# Patient Record
Sex: MALE | Race: WHITE | Employment: FULL TIME | URBAN - METROPOLITAN AREA
[De-identification: names, ages, dates, MRNs, and addresses within clinical notes are randomized per-mention and may not be internally consistent; named-entity substitution may affect disease eponyms.]

---

## 2017-01-04 ENCOUNTER — TELEPHONE (OUTPATIENT)
Dept: TRANSPLANT | Facility: CLINIC | Age: 62
End: 2017-01-04

## 2017-01-04 DIAGNOSIS — Z94.83 PANCREAS REPLACED BY TRANSPLANT (H): Primary | ICD-10-CM

## 2017-01-04 DIAGNOSIS — T86.899 COMPLICATION OF TRANSPLANTED PANCREAS: ICD-10-CM

## 2017-01-04 NOTE — TELEPHONE ENCOUNTER
Pt now tells me that he has been taking insulin since March 2016. He takes Lantau 14 units at HS and then 4 + units before each meal. Adds SS amount depending on his glucose. He has not returned to his PCP since then for any follow-up.  Will have him come here for his labs and US next week.

## 2017-01-04 NOTE — TELEPHONE ENCOUNTER
"Creatinine is rising- pt admits to possibly being dehydrated. Will make sure he is well hydrated and will have his labs drawn again next week.  Additionally, his glucose is 250- pt states that once in a while it goes up to about 250 and after a couple of days, comes back down to the 90's. He has not seen any physician about any of this.   Will get him in to see one of our transplant nephrologist's to assess both his rising creatinine and his elevated glucoses.     Per Dr Roe:  \"He needs to be established with a PCP to manage DM   Would like to know his A1c and C. Peptide in addition to the usual enzyme monitoring   Would get DSA and usual work up with imaging i.e. U/S of both grafts   Then we can discuss him in the light of the results   If DM developed related to rejection then it may be to far gone. If DM developed due to insulin resistance then will need to be addressed by the primary\"  "

## 2017-01-11 DIAGNOSIS — T86.899 COMPLICATION OF TRANSPLANTED PANCREAS: ICD-10-CM

## 2017-01-11 DIAGNOSIS — Z94.83 PANCREAS REPLACED BY TRANSPLANT (H): ICD-10-CM

## 2017-01-11 LAB
AMYLASE SERPL-CCNC: 85 U/L (ref 30–110)
ANION GAP SERPL CALCULATED.3IONS-SCNC: 9 MMOL/L (ref 3–14)
BASOPHILS # BLD AUTO: 0 10E9/L (ref 0–0.2)
BASOPHILS NFR BLD AUTO: 0.5 %
BUN SERPL-MCNC: 30 MG/DL (ref 7–30)
C PEPTIDE SERPL-MCNC: 0.6 NG/ML (ref 0.9–6.9)
CALCIUM SERPL-MCNC: 8.9 MG/DL (ref 8.5–10.1)
CHLORIDE SERPL-SCNC: 111 MMOL/L (ref 94–109)
CO2 SERPL-SCNC: 19 MMOL/L (ref 20–32)
CREAT SERPL-MCNC: 1.24 MG/DL (ref 0.66–1.25)
CYCLOSPORINE BLD LC/MS/MS-MCNC: 130 UG/L (ref 50–400)
DIFFERENTIAL METHOD BLD: ABNORMAL
EOSINOPHIL # BLD AUTO: 0 10E9/L (ref 0–0.7)
EOSINOPHIL NFR BLD AUTO: 1 %
ERYTHROCYTE [DISTWIDTH] IN BLOOD BY AUTOMATED COUNT: 13.8 % (ref 10–15)
GFR SERPL CREATININE-BSD FRML MDRD: 59 ML/MIN/1.7M2
GLUCOSE SERPL-MCNC: 102 MG/DL (ref 70–99)
HCT VFR BLD AUTO: 39.9 % (ref 40–53)
HGB BLD-MCNC: 14.5 G/DL (ref 13.3–17.7)
IMM GRANULOCYTES # BLD: 0 10E9/L (ref 0–0.4)
IMM GRANULOCYTES NFR BLD: 0.5 %
LIPASE SERPL-CCNC: 144 U/L (ref 73–393)
LYMPHOCYTES # BLD AUTO: 0.9 10E9/L (ref 0.8–5.3)
LYMPHOCYTES NFR BLD AUTO: 22.5 %
MCH RBC QN AUTO: 39.3 PG (ref 26.5–33)
MCHC RBC AUTO-ENTMCNC: 36.3 G/DL (ref 31.5–36.5)
MCV RBC AUTO: 108 FL (ref 78–100)
MONOCYTES # BLD AUTO: 0.5 10E9/L (ref 0–1.3)
MONOCYTES NFR BLD AUTO: 10.8 %
NEUTROPHILS # BLD AUTO: 2.7 10E9/L (ref 1.6–8.3)
NEUTROPHILS NFR BLD AUTO: 64.7 %
NRBC # BLD AUTO: 0 10*3/UL
NRBC BLD AUTO-RTO: 0 /100
PLATELET # BLD AUTO: 203 10E9/L (ref 150–450)
POTASSIUM SERPL-SCNC: 4.5 MMOL/L (ref 3.4–5.3)
RBC # BLD AUTO: 3.69 10E12/L (ref 4.4–5.9)
SODIUM SERPL-SCNC: 139 MMOL/L (ref 133–144)
TME LAST DOSE: NORMAL H
WBC # BLD AUTO: 4.2 10E9/L (ref 4–11)

## 2017-01-13 LAB
GAD65 AB SER IA-ACNC: NORMAL
PANC ISLET CELL AB TITR SER: NORMAL {TITER}

## 2017-01-14 LAB — INSULIN HUMAN AB SER-ACNC: NORMAL

## 2017-01-16 ENCOUNTER — TELEPHONE (OUTPATIENT)
Dept: TRANSPLANT | Facility: CLINIC | Age: 62
End: 2017-01-16

## 2017-01-16 NOTE — TELEPHONE ENCOUNTER
Left voicemail for Mathew to return my call.  I need to make sure 2/8 appointment with Dr. Strauss will work for him.  Shanae asked me to get him in soon.

## 2017-01-17 NOTE — TELEPHONE ENCOUNTER
Mathew said that he has to double check his schedule to make sure the 2/8 appointment will work for him.  He will call me back tomorrow to confirm.

## 2017-01-26 ENCOUNTER — TELEPHONE (OUTPATIENT)
Dept: NEPHROLOGY | Facility: CLINIC | Age: 62
End: 2017-01-26

## 2017-01-26 NOTE — TELEPHONE ENCOUNTER
Hello, this is Rica's office from Medicine Specialty on the 3rd floor at [facility] located at [address] [place] [address]. We are reminding you of your upcoming Nephrology appointment on 2/9/2017 at 3:55 ppm. Please arrive 30 prior to your appointment time for check in. Also, please bring an updated medication list including dose of prescribed and over the counter medications you are currently taking or your labeled medication bottles with you to your appointment. If you have any questions or would like to cancel or reschedule your appointment, please call us at [phone].     If you are having labs draw same day you need a lab appointment scheduled 1 hour prior to your visit.    You are welcome to have your labs done 2-7 days before your appointment at any [facility] or [facility] facility. If you have your labs completed before your appointment, please still come 15 minutes early for check-in.     Thank-You for allowing us to be a member of your Health Care Team,     Breanna ISRAEL CMA

## 2017-02-08 ENCOUNTER — OFFICE VISIT (OUTPATIENT)
Dept: NEPHROLOGY | Facility: CLINIC | Age: 62
End: 2017-02-08
Attending: INTERNAL MEDICINE
Payer: COMMERCIAL

## 2017-02-08 VITALS
TEMPERATURE: 98.4 F | SYSTOLIC BLOOD PRESSURE: 186 MMHG | HEART RATE: 64 BPM | DIASTOLIC BLOOD PRESSURE: 78 MMHG | OXYGEN SATURATION: 94 % | WEIGHT: 195.2 LBS

## 2017-02-08 DIAGNOSIS — Z94.0 H/O KIDNEY TRANSPLANT: ICD-10-CM

## 2017-02-08 DIAGNOSIS — C44.90 SKIN CANCER: ICD-10-CM

## 2017-02-08 DIAGNOSIS — I15.1 HYPERTENSION SECONDARY TO OTHER RENAL DISORDERS: ICD-10-CM

## 2017-02-08 DIAGNOSIS — E10.22 TYPE 1 DIABETES MELLITUS WITH DIABETIC CHRONIC KIDNEY DISEASE, UNSPECIFIED CKD STAGE (H): Primary | ICD-10-CM

## 2017-02-08 PROCEDURE — 99213 OFFICE O/P EST LOW 20 MIN: CPT | Mod: ZF

## 2017-02-08 NOTE — MR AVS SNAPSHOT
"              After Visit Summary   2/8/2017    Mathew Vaughan    MRN: [id number]           Patient Information     Date Of Birth          1955        Visit Information        Provider Department      2/8/2017 3:55 PM Enedina Strauss MD [facility] Nephrology        Today's Diagnoses     Type 1 diabetes mellitus with diabetic chronic kidney disease, unspecified CKD stage (H)    -  1     Skin cancer         H/O kidney transplant         Hypertension secondary to other renal disorders           Care Instructions    1.  Low salt diet  2.  Decrease imuran to 150 mg for 3 months.  Then 125 mg daily  3.  Stop ibuprofen  4.  Check BP at home twice daily and call  Daysi  next week  5.  Check cyclosporin level  Friday  6.  Will add BP med if it stays >150/90        Follow-ups after your visit        Follow-up notes from your care team     Return in about 1 year (around 2/8/2018).      Who to contact     If you have questions or need follow up information about today's clinic visit or your schedule please contact [facility] NEPHROLOGY directly at [phone].  Normal or non-critical lab and imaging results will be communicated to you by Chromahart, letter or phone within 4 business days after the clinic has received the results. If you do not hear from us within 7 days, please contact the clinic through [employer] or phone. If you have a critical or abnormal lab result, we will notify you by phone as soon as possible.  Submit refill requests through [employer] or call your pharmacy and they will forward the refill request to us. Please allow 3 business days for your refill to be completed.          Additional Information About Your Visit        Chromahart Information     [employer] lets you send messages to your doctor, view your test results, renew your prescriptions, schedule appointments and more. To sign up, go to [email or web address] . Click on \"Log in\" on the left side of the screen, which will take you to the " "Welcome page. Then click on \"Sign up Now\" on the right side of the page.     You will be asked to enter the access code listed below, as well as some personal information. Please follow the directions to create your username and password.     Your access code is: [email or web address]  Expires: 2017  6:30 AM     Your access code will  in 90 days. If you need help or a new code, please call your [facility] clinic or [phone].        Care EveryWhere ID     This is your Care EveryWhere ID. This could be used by other organizations to access your [facility] medical records  [id number]        Your Vitals Were     Pulse Temperature Pulse Oximetry             64 98.4  F (36.9  C) (Oral) 94%          Blood Pressure from Last 3 Encounters:   17 186/78   10/28/13 132/73    Weight from Last 3 Encounters:   17 88.542 kg (195 lb 3.2 oz)              Today, you had the following     No orders found for display       Primary Care Provider Office Phone # Fax #    Paul Osman [phone]       XXX RETIRED XXX  XXX [place] [address]        Thank you!     Thank you for choosing [facility] NEPHROLOGY  for your care. Our goal is always to provide you with excellent care. Hearing back from our patients is one way we can continue to improve our services. Please take a few minutes to complete the written survey that you may receive in the mail after your visit with us. Thank you!             Your Updated Medication List - Protect others around you: Learn how to safely use, store and throw away your medicines at www.disposemymeds.org.          This list is accurate as of: 17  4:50 PM.  Always use your most recent med list.                   Brand Name Dispense Instructions for use    azaTHIOprine 50 MG tablet    IMURAN    315 tablet    Take 3.5 tablets (175 mg) by mouth daily       * cycloSPORINE 100 MG capsule    SANDIMMUNE    180 capsule    Take 1 capsule (100 mg) by mouth 2 times daily Take with 25 mg " capsules. Total daily dose is 125 mg twice per day.       * cycloSPORINE 25 MG capsule    SANDIMMUNE    180 capsule    Take 1 capsule (25 mg) by mouth 2 times daily Take with 100 mg capsules. Total daily dose is 125 mg twice per day.       SODIUM BICARBONATE PO      Take 0.5 teaspoonful by mouth daily       UNKNOWN TO PATIENT      Take 1 tablet by mouth daily BLOOD PRESSURE MEDICATION, small white pill taken in the morning       * Notice:  This list has 2 medication(s) that are the same as other medications prescribed for you. Read the directions carefully, and ask your doctor or other care provider to review them with you.

## 2017-02-08 NOTE — NURSING NOTE
Chief Complaint   Patient presents with     RECHECK     Follow up kidney transplant       Initial /78 mmHg  Pulse 64  Temp(Src) 98.4  F (36.9  C) (Oral)  Wt 88.542 kg (195 lb 3.2 oz)  SpO2 94% There is no height on file to calculate BMI.  Medication Reconciliation: complete   Breanna ISRAEL CMA

## 2017-02-08 NOTE — LETTER
2/8/2017      RE: Mathew Vaughan  [address] [place] [id number]       REASON FOR VISIT:  Mr. Vaughan is a 61-year-old gentleman, status post SPK transplant in 1989, referred by Dr Osman primary care provider for a number of medical problems and to consider reduction of immunosuppression.       HISTORY OF PRESENT ILLNESS:  The patient has not been seen here by a nephrologist since 2004 (this is as far back as the chart goes).  He was seen by Dr. Bernardo Cadena in 2013 when he presented with a glucose of 350.  At that time, he had an unremarkable pancreas transplant US and was assumed to have developed recurrent diabetes in the setting of a sinus infection.  It was thought that rejection was unlikely, so a pancreas biopsy was not performed and the patient was placed back on insulin.       With respect to the kidney transplant, immunosuppression has been cyclosporine and azathioprine.  The patient has infrequent levels checked.  Most recent cyclosporine level was 130.  Imuran dose is 175 mg daily (2 mg/ kg).  Last DSA was checked in 05/2014 and was negative.  Baseline creatinine has been 1.0 to 1.2.        The patient presented today with new onset hypertension.  It was 196/76 in the clinic.  Blood pressure medication is metoprolol 100 mg XL daily and no diuretic.  He takes ibuprofen twice a week for headache or joint pain.  He has had no recent change in any medications.  He has had no recent weight gain.  He does not eat a high-salt diet.  Blood pressure has never been high in other clinics and our most recent blood pressure is 132/73, but that was in 2013.       Skin cancer - the patient has had a number of lesions removed.  He is overdue to see his dermatologist but generally sees one twice a year.       REVIEW OF SYSTEMS:  Comprehensive review of systems was completed and negative except as in the HPI.      SOCIAL HISTORY:  He lives in [place].  He gets exercise walking large dogs for up  to an hour and a half a day.  He drinks alcohol periodically and smokes a pipe.  He is currently working in [employer].      FAMILY HISTORY:  He has two brothers and one sister.  No one has hypertension or kidney disease.  He has two children.  A daughter, age 30, has hypertension and has a home blood pressure cuff.       PAST MEDICAL HISTORY:   1.  Type 1 diabetes, onset age 7 (1963).  Diabetes-free from 1989 to 2015 with a functioning pancreas transplant.    2.  Status post SPK, 1989, with no rejection episodes.   3.  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    4.  Skin cancer, onset 2012, with multiple Mohs procedures, now being seen twice per year.   5.  Hypertension, new onset (per patient) in clinic today.      PHYSICAL EXAMINATION:   VITAL SIGNS:  Blood pressure 196/76, O2 sat 94%, heart rate 64, weight 88 kg.    GENERAL:  He is well-developed, well-nourished.    SKIN:  He has extensive skin changes consistent with chronic immunosuppression with rough skin, scaly skin and actinic keratoses over his arms, legs, face and neck.   LUNGS:  Clear bilaterally.   CARDIAC:  Regular sinus rhythm.  No murmurs, rubs or gallops.   ABDOMEN:  Benign.  No bruit over the kidney transplant.    EXTREMITIES:  No peripheral edema.   NEUROLOGIC:  Intact.    LYMPH:  No adenopathy.    NECK:  No carotid bruits.       LABORATORY TESTS:  Creatinine 1.24.  Cyclosporine level 130.  DSA 0 in 2014.      Electrolytes/Renal -   Recent Labs   Lab Test  01/11/17   0915  10/21/13   1423   NA  139  137   POTASSIUM  4.5  4.1   CHLORIDE  111*  101   CO2  19*  29   BUN  30  14   CR  1.24  1.12   GLC  102*  161*   TRICIA  8.9  9.1       CBC -   Recent Labs   Lab Test  01/11/17   0915  10/21/13   1423   WBC  4.2  3.6*   HGB  14.5  11.7*   PLT  203  245       IMPRESSION:   1.  Allograft function.  Stable.  We will decrease Imuran dose because of accelerating course of skin cancer, negative DSA, and transplant longevity  of 28 years.  He can decrease the Imuran to 150 for    3 months and then 125 which would be 1.5 mg/kg daily.    2.  Hypertension.  Blood pressure is higher than it has been.  He thinks this might be white coat hypertension (I doubt it).  He will check his blood pressure at home on his daughter's blood pressure cuff and call Lorelei next week with the results.  I have advised him to stop ibuprofen and follow a low-salt diet, but he is likely to need additional medication, either calcium channel blocker or a diuretic.       PLAN:   1.  Home blood pressure monitoring.   2.  Stop ibuprofen.   3.  Low low-salt diet.   4.  Decrease Imuran to 150 mg daily.   5.  Check cyclosporine level Friday, target to be .         ENEDINA GORDILLO MD      Current Outpatient Prescriptions   Medication Sig Dispense Refill     SODIUM BICARBONATE PO Take 0.5 teaspoonful by mouth daily       METOPROLOL SUCCINATE ER PO Take 100 mg by mouth daily XL       cycloSPORINE (SANDIMMUNE) 100 MG capsule Take 1 capsule (100 mg) by mouth 2 times daily Take with 25 mg capsules. Total daily dose is 125 mg twice per day. 180 capsule 5     azaTHIOprine (IMURAN) 50 MG tablet Take 3.5 tablets (175 mg) by mouth daily 315 tablet 3     cycloSPORINE (SANDIMMUNE) 25 MG capsule Take 1 capsule (25 mg) by mouth 2 times daily Take with 100 mg capsules. Total daily dose is 125 mg twice per day. 180 capsule 5     UNKNOWN TO PATIENT Take 1 tablet by mouth daily BLOOD PRESSURE MEDICATION, small white pill taken in the morning                D: 2017 12:47   T: 02/10/2017 08:31   MT: DP      Name:     TOYA STARR   MRN:      [id number]        Account:      [id number]   :      1955           Service Date: 2017      Document: [id number]      Enedina Gordillo MD

## 2017-02-08 NOTE — PATIENT INSTRUCTIONS
1.  Low salt diet  2.  Decrease imuran to 150 mg for 3 months.  Then 125 mg daily  3.  Stop ibuprofen  4.  Check BP at home twice daily and call  Daysi  next week  5.  Check cyclosporin level  Friday  6.  Will add BP med if it stays >150/90

## 2017-02-09 DIAGNOSIS — Z94.83 PANCREAS REPLACED BY TRANSPLANT (H): Primary | ICD-10-CM

## 2017-02-09 DIAGNOSIS — Z94.0 KIDNEY REPLACED BY TRANSPLANT: ICD-10-CM

## 2017-02-09 NOTE — Clinical Note
February 9, 2017    OUTPATIENT LABORATORY TEST ORDER:  After First Year    Patient Name: Mathew Vaughan                   Transplant Date: 07-  YOB: 1955                                   Issue Date & Time: 2/9/2017  12:26 PM   [facility] MR: [id number]                                 Exp. Date (1 year after date issued)    Diagnoses:   Kidney Transplant (ICD-10 Z94.0)     Long term use of medications (ICD-10 Z79.899)             Lab results to be available on the same day drawn.   Please release results to patient upon their request    Please fax to the Transplant Center at [phone].    Monthly   Complete Blood Count with Platelets    Basic Metabolic Panel (Sodium, Potassium, Chloride, CO2, Creatinine, Urea Nitrogen, Glucose, Calcium)   Cyclosporine (CSA) drug level      Every 6 months,     Due:             BK PCR Quantitative/Serum               Complete Lipid Panel fasting (Cholesterol, Triglycerides, HDL, LDL)            Glycosylated Hemoglobin (A1c)              Urine for Protein/Creatinine        If you have any questions, please call The [facility]plant Center at [phone] or [phone].      .

## 2017-02-10 NOTE — PROGRESS NOTES
REASON FOR VISIT:  Mr. Vaughan is a 61-year-old gentleman, status post SPK transplant in 1989, referred by Dr Osman primary care provider for a number of medical problems and to consider reduction of immunosuppression.       HISTORY OF PRESENT ILLNESS:  The patient has not been seen here by a nephrologist since 2004 (this is as far back as the chart goes).  He was seen by Dr. Bernardo Cadena in 2013 when he presented with a glucose of 350.  At that time, he had an unremarkable pancreas transplant US and was assumed to have developed recurrent diabetes in the setting of a sinus infection.  It was thought that rejection was unlikely, so a pancreas biopsy was not performed and the patient was placed back on insulin.       With respect to the kidney transplant, immunosuppression has been cyclosporine and azathioprine.  The patient has infrequent levels checked.  Most recent cyclosporine level was 130.  Imuran dose is 175 mg daily (2 mg/ kg).  Last DSA was checked in 05/2014 and was negative.  Baseline creatinine has been 1.0 to 1.2.        The patient presented today with new onset hypertension.  It was 196/76 in the clinic.  Blood pressure medication is metoprolol 100 mg XL daily and no diuretic.  He takes ibuprofen twice a week for headache or joint pain.  He has had no recent change in any medications.  He has had no recent weight gain.  He does not eat a high-salt diet.  Blood pressure has never been high in other clinics and our most recent blood pressure is 132/73, but that was in 2013.       Skin cancer - the patient has had a number of lesions removed.  He is overdue to see his dermatologist but generally sees one twice a year.       REVIEW OF SYSTEMS:  Comprehensive review of systems was completed and negative except as in the HPI.      SOCIAL HISTORY:  He lives in [place].  He gets exercise walking large dogs for up to an hour and a half a day.  He drinks alcohol periodically and smokes a pipe.  He is  currently working in [employer].      FAMILY HISTORY:  He has two brothers and one sister.  No one has hypertension or kidney disease.  He has two children.  A daughter, age 30, has hypertension and has a home blood pressure cuff.       PAST MEDICAL HISTORY:   1.  Type 1 diabetes, onset age 7 (1963).  Diabetes-free from 1989 to 2015 with a functioning pancreas transplant.    2.  Status post SPK, 1989, with no rejection episodes.   3.  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    4.  Skin cancer, onset 2012, with multiple Mohs procedures, now being seen twice per year.   5.  Hypertension, new onset (per patient) in clinic today.      PHYSICAL EXAMINATION:   VITAL SIGNS:  Blood pressure 196/76, O2 sat 94%, heart rate 64, weight 88 kg.    GENERAL:  He is well-developed, well-nourished.    SKIN:  He has extensive skin changes consistent with chronic immunosuppression with rough skin, scaly skin and actinic keratoses over his arms, legs, face and neck.   LUNGS:  Clear bilaterally.   CARDIAC:  Regular sinus rhythm.  No murmurs, rubs or gallops.   ABDOMEN:  Benign.  No bruit over the kidney transplant.    EXTREMITIES:  No peripheral edema.   NEUROLOGIC:  Intact.    LYMPH:  No adenopathy.    NECK:  No carotid bruits.       LABORATORY TESTS:  Creatinine 1.24.  Cyclosporine level 130.  DSA 0 in 2014.      Electrolytes/Renal -   Recent Labs   Lab Test  01/11/17   0915  10/21/13   1423   NA  139  137   POTASSIUM  4.5  4.1   CHLORIDE  111*  101   CO2  19*  29   BUN  30  14   CR  1.24  1.12   GLC  102*  161*   TRICIA  8.9  9.1       CBC -   Recent Labs   Lab Test  01/11/17   0915  10/21/13   1423   WBC  4.2  3.6*   HGB  14.5  11.7*   PLT  203  245       IMPRESSION:   1.  Allograft function.  Stable.  We will decrease Imuran dose because of accelerating course of skin cancer, negative DSA, and transplant longevity of 28 years.  He can decrease the Imuran to 150 for    3 months and then 125 which  would be 1.5 mg/kg daily.    2.  Hypertension.  Blood pressure is higher than it has been.  He thinks this might be white coat hypertension (I doubt it).  He will check his blood pressure at home on his daughter's blood pressure cuff and call Lorelei next week with the results.  I have advised him to stop ibuprofen and follow a low-salt diet, but he is likely to need additional medication, either calcium channel blocker or a diuretic.       PLAN:   1.  Home blood pressure monitoring.   2.  Stop ibuprofen.   3.  Low low-salt diet.   4.  Decrease Imuran to 150 mg daily.   5.  Check cyclosporine level Friday, target to be .         JAZMYN GORDILLO MD      Current Outpatient Prescriptions   Medication Sig Dispense Refill     SODIUM BICARBONATE PO Take 0.5 teaspoonful by mouth daily       METOPROLOL SUCCINATE ER PO Take 100 mg by mouth daily XL       cycloSPORINE (SANDIMMUNE) 100 MG capsule Take 1 capsule (100 mg) by mouth 2 times daily Take with 25 mg capsules. Total daily dose is 125 mg twice per day. 180 capsule 5     azaTHIOprine (IMURAN) 50 MG tablet Take 3.5 tablets (175 mg) by mouth daily 315 tablet 3     cycloSPORINE (SANDIMMUNE) 25 MG capsule Take 1 capsule (25 mg) by mouth 2 times daily Take with 100 mg capsules. Total daily dose is 125 mg twice per day. 180 capsule 5     UNKNOWN TO PATIENT Take 1 tablet by mouth daily BLOOD PRESSURE MEDICATION, small white pill taken in the morning                D: 2017 12:47   T: 02/10/2017 08:31   MT: JUSTO      Name:     TOYA STARR   MRN:      [id number]        Account:      [id number]   :      1955           Service Date: 2017      Document: [id number]

## 2017-03-08 DIAGNOSIS — Z94.0 KIDNEY REPLACED BY TRANSPLANT: ICD-10-CM

## 2017-03-08 DIAGNOSIS — Z94.83 PANCREAS REPLACED BY TRANSPLANT (H): Primary | ICD-10-CM

## 2017-03-09 ENCOUNTER — TELEPHONE (OUTPATIENT)
Dept: TRANSPLANT | Facility: CLINIC | Age: 62
End: 2017-03-09

## 2017-03-09 RX ORDER — AZATHIOPRINE 50 MG/1
175 TABLET ORAL DAILY
Qty: 315 TABLET | Refills: 3 | Status: SHIPPED | OUTPATIENT
Start: 2017-03-09 | End: 2018-01-12

## 2017-03-09 RX ORDER — CYCLOSPORINE 100 MG/1
100 CAPSULE, LIQUID FILLED ORAL 2 TIMES DAILY
Qty: 60 CAPSULE | Refills: 11 | Status: SHIPPED | OUTPATIENT
Start: 2017-03-09 | End: 2018-01-11

## 2017-03-09 RX ORDER — CYCLOSPORINE 25 MG/1
25 CAPSULE, LIQUID FILLED ORAL 2 TIMES DAILY
Qty: 60 CAPSULE | Refills: 11 | Status: SHIPPED | OUTPATIENT
Start: 2017-03-09 | End: 2018-01-11

## 2017-03-09 NOTE — TELEPHONE ENCOUNTER
Please fax to [facility] refill request for Cyclosporine 100mg, 25m, Azathioporine, fax: [phone]

## 2017-03-10 ENCOUNTER — TELEPHONE (OUTPATIENT)
Dept: TRANSPLANT | Facility: CLINIC | Age: 62
End: 2017-03-10

## 2017-03-10 NOTE — TELEPHONE ENCOUNTER
Please call Mathew regarding his refills question waitning on a refill from 02/2017  # [phone]

## 2017-03-10 NOTE — TELEPHONE ENCOUNTER
Explained to patient rx were sent in Feb and again yesterday. Patient running low, patient will call [facility] to see when his medicine will be shipped. Gave patient my direct # if he has any issues.

## 2017-05-05 DIAGNOSIS — Z94.0 KIDNEY REPLACED BY TRANSPLANT: ICD-10-CM

## 2017-05-05 DIAGNOSIS — Z94.83 PANCREAS REPLACED BY TRANSPLANT (H): ICD-10-CM

## 2017-05-05 PROCEDURE — 80158 DRUG ASSAY CYCLOSPORINE: CPT | Performed by: INTERNAL MEDICINE

## 2017-05-09 LAB
CYCLOSPORINE BLD LC/MS/MS-MCNC: 123 UG/L (ref 50–400)
TME LAST DOSE: NORMAL H

## 2017-11-01 ENCOUNTER — TELEPHONE (OUTPATIENT)
Dept: TRANSPLANT | Facility: CLINIC | Age: 62
End: 2017-11-01

## 2017-11-01 NOTE — LETTER
OUTPATIENT LABORATORY TEST ORDER:  After First Year        Patient Name: Mathew Vaughan                   Transplant Date: 07-  YOB: 1955                                   Issue Date & Time: 11/1/2017 10:26 AM   [facility] MR: [id number]                                 Exp. Date (1 year after date issued)          Diagnoses:   Kidney Transplant (ICD-10 Z94.0)     Long term use of medications (ICD-10 Z79.899)                Lab results to be available on the same day drawn.   Please release results to patient upon their request    Please fax to the Transplant Center at [phone].        Monthly   Complete Blood Count with Platelets    Basic Metabolic Panel (Sodium, Potassium, Chloride, CO2, Creatinine, Urea Nitrogen, Glucose, Calcium)   Cyclosporine (CSA) drug level      Every 6 months,     Due: October and April            BK PCR Quantitative/Serum               Complete Lipid Panel fasting (Cholesterol, Triglycerides, HDL, LDL)            Glycosylated Hemoglobin (A1c)              Urine for Protein/Creatinine        If you have any questions, please call [facility] at [phone] or [phone].

## 2017-11-29 DIAGNOSIS — Z94.0 KIDNEY REPLACED BY TRANSPLANT: ICD-10-CM

## 2017-11-29 DIAGNOSIS — Z94.83 PANCREAS REPLACED BY TRANSPLANT (H): ICD-10-CM

## 2017-11-29 PROCEDURE — 80158 DRUG ASSAY CYCLOSPORINE: CPT | Performed by: INTERNAL MEDICINE

## 2017-11-30 ENCOUNTER — TELEPHONE (OUTPATIENT)
Dept: TRANSPLANT | Facility: CLINIC | Age: 62
End: 2017-11-30

## 2017-11-30 LAB
CYCLOSPORINE BLD LC/MS/MS-MCNC: 299 UG/L (ref 50–400)
TME LAST DOSE: NORMAL H

## 2017-11-30 NOTE — TELEPHONE ENCOUNTER
Issue:     CSA high at 299.  Goal is  per Dr. Strauss.    LPN task:    Please confirm if this is a 12 hour level.  Also confirm current dose 125 mg of cyclosporine twice daily.  If this was not a 12 hour trough, patient needs to repeat labs next week.  If it was a 12 hour level, please decrease CSA to 100 mg twice daily and have him recheck all transplant labs next week.

## 2017-11-30 NOTE — LETTER
PHYSICIAN ORDERS      DATE & TIME ISSUED: 2017 11:35 AM  PATIENT NAME: Mathew Vaughan   : 1955     [facility] MR# [if applicable]: [id number]     DIAGNOSIS:  Kidney and pancreas transplant  ICD-9 CODE: Z94.0 and Z94.83     Please recheck the following lab level within one week  Cyclosporine Level    Any questions please call: [phone]    Please fax these results to [phone].

## 2017-12-01 NOTE — TELEPHONE ENCOUNTER
Patient returned call to states that he is unsure if he had took his medications prior to his lab draw. No dose change made, patient verbalizes understanding to continue with previous dose and recheck level Tuesday or Wednesday next week. Order placed

## 2017-12-01 NOTE — TELEPHONE ENCOUNTER
Call placed to patient: No answer. Detailed voice message left requesting a return call to discuss CSA level and possible dose change. Will try back.

## 2017-12-06 DIAGNOSIS — Z94.83 PANCREAS REPLACED BY TRANSPLANT (H): ICD-10-CM

## 2017-12-06 DIAGNOSIS — Z94.0 KIDNEY REPLACED BY TRANSPLANT: ICD-10-CM

## 2017-12-06 PROCEDURE — 80158 DRUG ASSAY CYCLOSPORINE: CPT | Performed by: INTERNAL MEDICINE

## 2017-12-07 LAB
CYCLOSPORINE BLD LC/MS/MS-MCNC: 67 UG/L (ref 50–400)
TME LAST DOSE: NORMAL H

## 2017-12-08 ENCOUNTER — TELEPHONE (OUTPATIENT)
Dept: TRANSPLANT | Facility: CLINIC | Age: 62
End: 2017-12-08

## 2017-12-08 NOTE — TELEPHONE ENCOUNTER
CSA level low at 67, goal is .    Plan:     No dose change at this time.  Please repeat CSA level in the next 2 weeks.  We may need to increase CSA dose if it is still low.  Please make sure to get a 12 hour trough level.

## 2017-12-08 NOTE — TELEPHONE ENCOUNTER
Call placed to patient: Patient verbalizes understanding to recheck level in 2 weeks ensuring an accurate twelve hour lab draw. Order sent

## 2017-12-08 NOTE — LETTER
PHYSICIAN ORDERS      DATE & TIME ISSUED: 2017 2:17 PM  PATIENT NAME: Mathew Vaughan   : 1955     [facility] MR# [if applicable]: [id number]     DIAGNOSIS:  Kidney and pancreas transplant  ICD-9 CODE: Z94.0 and Z94.83      Please recheck the following level in two weeks   Cyclosporine Level    Any questions please call: [phone]    Please fax these results to [phone].    .

## 2018-01-11 DIAGNOSIS — Z94.0 KIDNEY REPLACED BY TRANSPLANT: ICD-10-CM

## 2018-01-11 DIAGNOSIS — Z94.83 PANCREAS REPLACED BY TRANSPLANT (H): ICD-10-CM

## 2018-01-12 DIAGNOSIS — Z94.0 KIDNEY REPLACED BY TRANSPLANT: ICD-10-CM

## 2018-01-12 DIAGNOSIS — Z94.83 PANCREAS REPLACED BY TRANSPLANT (H): ICD-10-CM

## 2018-01-12 RX ORDER — CYCLOSPORINE 100 MG/1
100 CAPSULE, LIQUID FILLED ORAL 2 TIMES DAILY
Qty: 60 CAPSULE | Refills: 1 | Status: SHIPPED | OUTPATIENT
Start: 2018-01-12 | End: 2018-02-26

## 2018-01-12 RX ORDER — AZATHIOPRINE 50 MG/1
175 TABLET ORAL DAILY
Qty: 315 TABLET | Refills: 3 | Status: SHIPPED | OUTPATIENT
Start: 2018-01-12 | End: 2019-01-30

## 2018-01-12 RX ORDER — CYCLOSPORINE 25 MG/1
25 CAPSULE, LIQUID FILLED ORAL 2 TIMES DAILY
Qty: 60 CAPSULE | Refills: 1 | Status: SHIPPED | OUTPATIENT
Start: 2018-01-12 | End: 2018-02-26

## 2018-02-09 DIAGNOSIS — Z94.83 PANCREAS REPLACED BY TRANSPLANT (H): ICD-10-CM

## 2018-02-09 DIAGNOSIS — Z94.0 KIDNEY REPLACED BY TRANSPLANT: ICD-10-CM

## 2018-02-09 PROCEDURE — 80158 DRUG ASSAY CYCLOSPORINE: CPT | Performed by: INTERNAL MEDICINE

## 2018-02-11 LAB
CYCLOSPORINE BLD LC/MS/MS-MCNC: 80 UG/L (ref 50–400)
TME LAST DOSE: NORMAL H

## 2018-02-25 DIAGNOSIS — Z94.83 PANCREAS REPLACED BY TRANSPLANT (H): ICD-10-CM

## 2018-02-25 DIAGNOSIS — Z94.0 KIDNEY REPLACED BY TRANSPLANT: ICD-10-CM

## 2018-02-25 RX ORDER — CYCLOSPORINE 100 MG/1
CAPSULE, GELATIN COATED ORAL
Qty: 60 CAPSULE | Refills: 0 | Status: CANCELLED | OUTPATIENT
Start: 2018-02-25

## 2018-02-26 DIAGNOSIS — Z94.0 KIDNEY REPLACED BY TRANSPLANT: ICD-10-CM

## 2018-02-26 DIAGNOSIS — Z94.83 PANCREAS REPLACED BY TRANSPLANT (H): Primary | ICD-10-CM

## 2018-02-26 RX ORDER — CYCLOSPORINE 100 MG/1
100 CAPSULE, LIQUID FILLED ORAL 2 TIMES DAILY
Qty: 60 CAPSULE | Refills: 0 | Status: SHIPPED | OUTPATIENT
Start: 2018-02-26 | End: 2018-03-23

## 2018-02-26 RX ORDER — CYCLOSPORINE 25 MG/1
25 CAPSULE, GELATIN COATED ORAL 2 TIMES DAILY
Qty: 60 CAPSULE | Refills: 0 | Status: SHIPPED | OUTPATIENT
Start: 2018-02-26 | End: 2018-03-23

## 2018-03-23 DIAGNOSIS — Z94.83 PANCREAS REPLACED BY TRANSPLANT (H): ICD-10-CM

## 2018-03-23 DIAGNOSIS — Z94.0 KIDNEY REPLACED BY TRANSPLANT: Primary | ICD-10-CM

## 2018-03-23 RX ORDER — CYCLOSPORINE 100 MG/1
100 CAPSULE, GELATIN COATED ORAL 2 TIMES DAILY
Qty: 60 CAPSULE | Refills: 11 | OUTPATIENT
Start: 2018-03-23

## 2018-03-23 RX ORDER — CYCLOSPORINE 100 MG/1
100 CAPSULE, LIQUID FILLED ORAL 2 TIMES DAILY
Qty: 60 CAPSULE | Refills: 0 | OUTPATIENT
Start: 2018-03-23

## 2018-03-23 RX ORDER — CYCLOSPORINE 25 MG/1
25 CAPSULE, GELATIN COATED ORAL 2 TIMES DAILY
Qty: 60 CAPSULE | Refills: 11 | OUTPATIENT
Start: 2018-03-23

## 2018-03-23 RX ORDER — CYCLOSPORINE 25 MG/1
25 CAPSULE, LIQUID FILLED ORAL 2 TIMES DAILY
Qty: 60 CAPSULE | Refills: 0 | OUTPATIENT
Start: 2018-03-23

## 2018-04-05 DIAGNOSIS — Z94.0 KIDNEY REPLACED BY TRANSPLANT: ICD-10-CM

## 2018-04-05 DIAGNOSIS — Z94.83 PANCREAS REPLACED BY TRANSPLANT (H): Primary | ICD-10-CM

## 2018-04-05 RX ORDER — CYCLOSPORINE 100 MG/1
100 CAPSULE, LIQUID FILLED ORAL 2 TIMES DAILY
Qty: 60 CAPSULE | Refills: 3 | Status: SHIPPED | OUTPATIENT
Start: 2018-04-05 | End: 2018-04-06

## 2018-04-05 RX ORDER — CYCLOSPORINE 25 MG/1
25 CAPSULE, LIQUID FILLED ORAL 2 TIMES DAILY
Qty: 60 CAPSULE | Refills: 3 | Status: SHIPPED | OUTPATIENT
Start: 2018-04-05 | End: 2018-04-06

## 2018-04-05 NOTE — TELEPHONE ENCOUNTER
Provider Call: Provider Call: Medication Refill  Route to LPN  Pharmacy Name: [facility]  Pharmacy Location: [facility]  Name of Medication: Cyclosporine Dose: 24/100mg  When will the patient be out of this medication?: Less than 3 days (Route high priority)  Callback needed? Yes  Return Call Needed  Same as documented in contacts section  When to return call?: Same day: Route High Priority

## 2018-04-06 DIAGNOSIS — Z94.83 PANCREAS REPLACED BY TRANSPLANT (H): Primary | ICD-10-CM

## 2018-04-06 DIAGNOSIS — Z94.0 KIDNEY REPLACED BY TRANSPLANT: ICD-10-CM

## 2018-04-06 RX ORDER — CYCLOSPORINE 100 MG/1
100 CAPSULE, GELATIN COATED ORAL 2 TIMES DAILY
Qty: 60 CAPSULE | Refills: 11 | Status: SHIPPED | OUTPATIENT
Start: 2018-04-06 | End: 2019-04-19

## 2018-04-06 RX ORDER — CYCLOSPORINE 25 MG/1
25 CAPSULE, GELATIN COATED ORAL 2 TIMES DAILY
Qty: 60 CAPSULE | Refills: 11 | Status: SHIPPED | OUTPATIENT
Start: 2018-04-06 | End: 2019-04-19

## 2018-04-06 NOTE — TELEPHONE ENCOUNTER
Darby the pharmacist from [facility] calling in regards to patients Cyclosporine.   Patients current prescription is saying for the modified version and patient typically gets non-modified.   Please call Darby at [phone] for clarifications.   Thanks   Nava Bryant LPN

## 2018-05-04 ENCOUNTER — TELEPHONE (OUTPATIENT)
Dept: TRANSPLANT | Facility: CLINIC | Age: 63
End: 2018-05-04

## 2018-05-04 DIAGNOSIS — Z48.298 AFTERCARE FOLLOWING ORGAN TRANSPLANT: Primary | ICD-10-CM

## 2018-05-04 NOTE — LETTER
OUTPATIENT LABORATORY TEST ORDER:  After First Year    Patient Name: Mathew Vaughan                           Transplant Date: 7-9-1989  YOB: 1955                                   Issue Date & Time:May 4, 18713:03 AM    [facility] MR: [id number]                                   Exp. Date (1 year after date issued)    Diagnoses:   Kidney Transplant (ICD-9 V42.0)      Pancreas Transplant (ICD-9 V42.83)     Long term use of medications (ICD-9  V58.69)                Lab results to be available on the same day drawn.   Please release results to patient upon their request    Please fax to the Transplant Center at [phone].      Monthly   Complete Blood Count with Platelets    Basic Metabolic Panel (Sodium, Potassium, Chloride, CO2, Creatinine, Urea Nitrogen, Glucose, Calcium)   Amylase, Lipase   Cyclosporine    Every 6 months,     Due: April and October            BK PCR Quantitative/Serum               Complete Lipid Panel fasting (Cholesterol, Triglycerides, HDL, LDL)            Glycosylated Hemoglobin (A1c)              Urine for Protein/Creatinine      If you have any questions, please call [facility] at [phone] or [phone].      .

## 2018-05-04 NOTE — TELEPHONE ENCOUNTER
Katarzyna Sharpe 17 hours ago (2:59 PM)                 Mathew called because he is seeing [facility] on 5/22 for annual f/u and he wants to have labs drawn next week at [email or web address] and wants orders sent to them     PLAN/TASK:  Please place order per above request. Thanks!

## 2018-05-07 ENCOUNTER — TELEPHONE (OUTPATIENT)
Dept: NEPHROLOGY | Facility: CLINIC | Age: 63
End: 2018-05-07

## 2018-05-07 NOTE — TELEPHONE ENCOUNTER
Left voicemail for patient to call back (follow up from last transplant appointment).    Haily Kelly RN

## 2018-05-09 DIAGNOSIS — Z48.298 AFTERCARE FOLLOWING ORGAN TRANSPLANT: ICD-10-CM

## 2018-05-09 PROCEDURE — 80158 DRUG ASSAY CYCLOSPORINE: CPT | Performed by: INTERNAL MEDICINE

## 2018-05-11 ENCOUNTER — TELEPHONE (OUTPATIENT)
Dept: TRANSPLANT | Facility: CLINIC | Age: 63
End: 2018-05-11

## 2018-05-11 LAB
CYCLOSPORINE BLD LC/MS/MS-MCNC: 109 UG/L (ref 50–400)
TME LAST DOSE: NORMAL H

## 2018-05-11 NOTE — TELEPHONE ENCOUNTER
ISSUE:  Pt's cyclosporine level 109 (goal s/b )    PLAN/TASK:  Please call pt, verify good 12 hour level and dose of 125mg twice daily  No dose change at this time, see if he can repeat the level and adjust from there  Enter lab order.

## 2018-05-14 ASSESSMENT — ENCOUNTER SYMPTOMS
SINUS PAIN: 0
TROUBLE SWALLOWING: 0
SORE THROAT: 0
SKIN CHANGES: 0
HOARSE VOICE: 0
NECK MASS: 0
SINUS CONGESTION: 0
TASTE DISTURBANCE: 0
SMELL DISTURBANCE: 0
POOR WOUND HEALING: 0
NAIL CHANGES: 0

## 2018-05-15 ENCOUNTER — OFFICE VISIT (OUTPATIENT)
Dept: NEPHROLOGY | Facility: CLINIC | Age: 63
End: 2018-05-15
Attending: INTERNAL MEDICINE
Payer: COMMERCIAL

## 2018-05-15 VITALS
WEIGHT: 192.4 LBS | HEART RATE: 54 BPM | BODY MASS INDEX: 32.06 KG/M2 | OXYGEN SATURATION: 99 % | DIASTOLIC BLOOD PRESSURE: 74 MMHG | HEIGHT: 65 IN | SYSTOLIC BLOOD PRESSURE: 189 MMHG

## 2018-05-15 DIAGNOSIS — Z94.0 KIDNEY TRANSPLANTED: Primary | ICD-10-CM

## 2018-05-15 DIAGNOSIS — I10 HYPERTENSION, UNSPECIFIED TYPE: ICD-10-CM

## 2018-05-15 DIAGNOSIS — D84.9 IMMUNOSUPPRESSION (H): ICD-10-CM

## 2018-05-15 DIAGNOSIS — E10.22 CONTROLLED TYPE 1 DIABETES MELLITUS WITH STAGE 3 CHRONIC KIDNEY DISEASE (H): ICD-10-CM

## 2018-05-15 DIAGNOSIS — Z12.83 SKIN CANCER SCREENING: ICD-10-CM

## 2018-05-15 DIAGNOSIS — N18.30 CONTROLLED TYPE 1 DIABETES MELLITUS WITH STAGE 3 CHRONIC KIDNEY DISEASE (H): ICD-10-CM

## 2018-05-15 PROCEDURE — G0463 HOSPITAL OUTPT CLINIC VISIT: HCPCS | Mod: ZF

## 2018-05-15 RX ORDER — BLOOD-GLUCOSE METER
EACH MISCELLANEOUS
COMMUNITY
Start: 2013-10-15

## 2018-05-15 RX ORDER — FLUTICASONE PROPIONATE 50 MCG
SPRAY, SUSPENSION (ML) NASAL
COMMUNITY
Start: 2016-12-26

## 2018-05-15 ASSESSMENT — PAIN SCALES - GENERAL: PAINLEVEL: NO PAIN (0)

## 2018-05-15 NOTE — LETTER
5/15/2018      RE: Mathew Vaughan  [address]  [place] [address]       Assessment and Plan:  1. SPK - ESRD due to DMI s/p SPK in 1989.     UPC: 0.5 g / g  DSA: none checked since 2014.  BK: negative in 2017    Creatinine baseline 1.2-1.3 mg / dl    2. Immunosuppression: currently on     Cyclosporine 125 mg po bid - goal trough is .  Azathioprine 175 mg po daily    3. HTN: The patient's blood pressure is elevated today.  However, the patient notes that his blood pressure at home is 120s over 60s.  As such, I have recommended that he check his blood pressure cuff against a clinic pressure to make sure that it is concordant.  If it is I would make no changes to his antihypertensive regimen as he would then be shown to have true whitecoat hypertension and goal would be home pressures of 100 130/60-80.  Continue metoprolol.    4. DMI: s/p failed pancreas transplant. Followed by PCP. Last a1c 6 g %. Well controlled    5. Skin cancer screening: last in December. Last excision in September.     Assessment and plan was discussed with patient and he voiced his understanding and agreement.    Reason for Visit:  Mr. Vaughan is here for routine follow up and kidney transplant.    HPI:   Mathew Vaughan is a 63 year old male with ESKD from Type 1 Diabetes and is status post SPK in 1989.         Transplant Hx:       Tx: SPK  Date: 1989       Present Maintenance IS: Cyclosporine and Azathioprine       Baseline Creatinine: 1.2       Recent DSA: No         Biopsy: No    The patient is a 63-year-old male with history of end-stage kidney disease due to type 1 diabetes status post simultaneous pancreas kidney transplant in 1989 here for follow-up of his transplants.  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  His kidney transplant continues to work quite well.  His baseline creatinine is 1.2-1.3 mg/dL.    He is on chronic immunosuppression with cyclosporine and azathioprine.    Today he  "is without complaint.  He specifically denies any chest pain or shortness of breath.  He did state that he has intermittent esophageal pain that happens at rest but has no exertional qualities.  He denies any nausea or vomiting.  He has no fever shakes or chills.  He has no problems with constipation or diarrhea.    His blood pressure today is quite elevated.  He notes that he has this \"white coat hypertension\" and this has been present for some time.    Home BP: average 120/65.      ROS:   A comprehensive review of systems was obtained and negative, except as noted in the HPI or PMH.    Active Medical Problems:  Patient Active Problem List   Diagnosis     Diabetes (H)     H/O kidney transplant     H/O pancreas transplant (H)     Skin cancer     Hypertension     Personal Hx:  Social History     Social History     Marital status:      Spouse name: N/A     Number of children: N/A     Years of education: N/A     Occupational History     Not on file.     Social History Main Topics     Smoking status: Current Every Day Smoker     Types: Pipe     Smokeless tobacco: Never Used     Alcohol use Not on file     Drug use: Not on file     Sexual activity: Not on file     Other Topics Concern     Not on file     Social History Narrative     Allergies:  No Known Allergies    Medications:  Prior to Admission medications    Medication Sig Start Date End Date Taking? Authorizing Provider   azaTHIOprine (IMURAN) 50 MG tablet Take 3.5 tablets (175 mg) by mouth daily 1/12/18  Yes Enedina Strauss MD   blood glucose monitoring (NO BRAND SPECIFIED) test strip TEST STRIPS COVERED BY PT INS. 250.00 IDDM TYPE II - TEST 3 TIMES/DAY. 5/28/17  Yes Reported, Patient   Blood Glucose Monitoring Suppl (GLUCOCOM BLOOD GLUCOSE MONITOR) RAIN Dispense glucose meter, test strips and lancets covered by the patient insurance. Test 3 times per day. 10/15/13  Yes Reported, Patient   cycloSPORINE (SANDIMMUNE) 100 MG capsule Take 1 capsule (100 mg) " "by mouth 2 times daily 4/6/18  Yes Enedina Strauss MD   cycloSPORINE (SANDIMMUNE) 25 MG capsule Take 1 capsule (25 mg) by mouth 2 times daily Total dose 125 mg twice daily 4/6/18  Yes Enedina Strauss MD   fluticasone (FLONASE) 50 MCG/ACT spray  12/26/16  Yes Reported, Patient   insulin aspart (NOVOLOG PEN) 100 UNIT/ML injection INJECT 8UNITS BEFORE BREAKFAST AND LUNCH AND 8  UNITS WITH SUPPER 3/2/18  Yes Reported, Patient   insulin glargine (LANTUS SOLOSTAR) 100 UNIT/ML pen Inject 15 Units Subcutaneous 3/2/18  Yes Reported, Patient   insulin pen needle (B-D U/F) 31G X 8 MM USE TO INJECT WITH INSULIN PEN DEVICE 11/12/15  Yes Reported, Patient   METOPROLOL SUCCINATE ER PO Take 100 mg by mouth daily XL   Yes Reported, Patient   SODIUM BICARBONATE PO Take 0.5 teaspoonful by mouth daily   Yes Reported, Patient     Vitals:  /74  Pulse 54  Ht 1.662 m (5' 5.45\")  Wt 87.3 kg (192 lb 6.4 oz)  SpO2 99%  BMI 31.58 kg/m2    Exam:   GENERAL APPEARANCE: alert and no distress  HENT: mouth without ulcers or lesions  LYMPHATICS: no cervical or supraclavicular nodes  RESP: lungs clear to auscultation - no rales, rhonchi or wheezes  CV: regular rhythm, normal rate, no rub, no murmur  EDEMA: no LE edema bilaterally  ABDOMEN: soft, nondistended, nontender, bowel sounds normal  MS: extremities normal - no gross deformities noted, no evidence of inflammation in joints, no muscle tenderness  SKIN: no rash    Results:   Labs reviewed with patient.      Alberto Murphy MD      "

## 2018-05-15 NOTE — PROGRESS NOTES
Assessment and Plan:  1. SPK - ESRD due to DMI s/p SPK in 1989.     UPC: 0.5 g / g  DSA: none checked since 2014.  BK: negative in 2017    Creatinine baseline 1.2-1.3 mg / dl    2. Immunosuppression: currently on     Cyclosporine 125 mg po bid - goal trough is .  Azathioprine 175 mg po daily    3. HTN: The patient's blood pressure is elevated today.  However, the patient notes that his blood pressure at home is 120s over 60s.  As such, I have recommended that he check his blood pressure cuff against a clinic pressure to make sure that it is concordant.  If it is I would make no changes to his antihypertensive regimen as he would then be shown to have true whitecoat hypertension and goal would be home pressures of 100 130/60-80.  Continue metoprolol.    4. DMI: s/p failed pancreas transplant. Followed by PCP. Last a1c 6 g %. Well controlled    5. Skin cancer screening: last in December. Last excision in September.     Assessment and plan was discussed with patient and he voiced his understanding and agreement.    Reason for Visit:  Mr. Vaughan is here for routine follow up and kidney transplant.    HPI:   Mathew Vaughan is a 63 year old male with ESKD from Type 1 Diabetes and is status post SPK in 1989.         Transplant Hx:       Tx: SPK  Date: 1989       Present Maintenance IS: Cyclosporine and Azathioprine       Baseline Creatinine: 1.2       Recent DSA: No         Biopsy: No    The patient is a 63-year-old male with history of end-stage kidney disease due to type 1 diabetes status post simultaneous pancreas kidney transplant in 1989 here for follow-up of his transplants.  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  His kidney transplant continues to work quite well.  His baseline creatinine is 1.2-1.3 mg/dL.    He is on chronic immunosuppression with cyclosporine and azathioprine.    Today he is without complaint.  He specifically denies any chest pain or shortness of breath.   "He did state that he has intermittent esophageal pain that happens at rest but has no exertional qualities.  He denies any nausea or vomiting.  He has no fever shakes or chills.  He has no problems with constipation or diarrhea.    His blood pressure today is quite elevated.  He notes that he has this \"white coat hypertension\" and this has been present for some time.    Home BP: average 120/65.      ROS:   A comprehensive review of systems was obtained and negative, except as noted in the HPI or PMH.    Active Medical Problems:  Patient Active Problem List   Diagnosis     Diabetes (H)     H/O kidney transplant     H/O pancreas transplant (H)     Skin cancer     Hypertension     Personal Hx:  Social History     Social History     Marital status:      Spouse name: N/A     Number of children: N/A     Years of education: N/A     Occupational History     Not on file.     Social History Main Topics     Smoking status: Current Every Day Smoker     Types: Pipe     Smokeless tobacco: Never Used     Alcohol use Not on file     Drug use: Not on file     Sexual activity: Not on file     Other Topics Concern     Not on file     Social History Narrative     Allergies:  No Known Allergies    Medications:  Prior to Admission medications    Medication Sig Start Date End Date Taking? Authorizing Provider   azaTHIOprine (IMURAN) 50 MG tablet Take 3.5 tablets (175 mg) by mouth daily 1/12/18  Yes Enedina Strauss MD   blood glucose monitoring (NO BRAND SPECIFIED) test strip TEST STRIPS COVERED BY PT INS. 250.00 IDDM TYPE II - TEST 3 TIMES/DAY. 5/28/17  Yes Reported, Patient   Blood Glucose Monitoring Suppl (GLUCOCOM BLOOD GLUCOSE MONITOR) RAIN Dispense glucose meter, test strips and lancets covered by the patient insurance. Test 3 times per day. 10/15/13  Yes Reported, Patient   cycloSPORINE (SANDIMMUNE) 100 MG capsule Take 1 capsule (100 mg) by mouth 2 times daily 4/6/18  Yes Enedina Strauss MD   cycloSPORINE (SANDIMMUNE) 25 " "MG capsule Take 1 capsule (25 mg) by mouth 2 times daily Total dose 125 mg twice daily 4/6/18  Yes Enedina Strauss MD   fluticasone (FLONASE) 50 MCG/ACT spray  12/26/16  Yes Reported, Patient   insulin aspart (NOVOLOG PEN) 100 UNIT/ML injection INJECT 8UNITS BEFORE BREAKFAST AND LUNCH AND 8  UNITS WITH SUPPER 3/2/18  Yes Reported, Patient   insulin glargine (LANTUS SOLOSTAR) 100 UNIT/ML pen Inject 15 Units Subcutaneous 3/2/18  Yes Reported, Patient   insulin pen needle (B-D U/F) 31G X 8 MM USE TO INJECT WITH INSULIN PEN DEVICE 11/12/15  Yes Reported, Patient   METOPROLOL SUCCINATE ER PO Take 100 mg by mouth daily XL   Yes Reported, Patient   SODIUM BICARBONATE PO Take 0.5 teaspoonful by mouth daily   Yes Reported, Patient     Vitals:  /74  Pulse 54  Ht 1.662 m (5' 5.45\")  Wt 87.3 kg (192 lb 6.4 oz)  SpO2 99%  BMI 31.58 kg/m2    Exam:   GENERAL APPEARANCE: alert and no distress  HENT: mouth without ulcers or lesions  LYMPHATICS: no cervical or supraclavicular nodes  RESP: lungs clear to auscultation - no rales, rhonchi or wheezes  CV: regular rhythm, normal rate, no rub, no murmur  EDEMA: no LE edema bilaterally  ABDOMEN: soft, nondistended, nontender, bowel sounds normal  MS: extremities normal - no gross deformities noted, no evidence of inflammation in joints, no muscle tenderness  SKIN: no rash    Results:   Labs reviewed with patient.    "

## 2018-05-15 NOTE — NURSING NOTE
"Chief Complaint   Patient presents with     RECHECK     Annual kidney tx follow up     /74  Pulse 54  Ht 1.662 m (5' 5.45\")  Wt 87.3 kg (192 lb 6.4 oz)  SpO2 99%  BMI 31.58 kg/m2  KALLI PALACIOS CMA    "

## 2018-05-15 NOTE — MR AVS SNAPSHOT
After Visit Summary   5/15/2018    Mathew Vaughan    MRN: [id number]           Patient Information     Date Of Birth          1955        Visit Information        Provider Department      5/15/2018 10:10 AM Alberto Murphy MD [facility] Nephrology        Care Instructions    Check cuff pressure at home with clinic pressure.     Goal is 100-130/60-80.    Labs every 2-3 months.     Follow up in 12 months.    Continue follow up with dermatology.     Call with questions.           Follow-ups after your visit        Follow-up notes from your care team     Return in about 1 year (around 5/15/2019).      Who to contact     If you have questions or need follow up information about today's clinic visit or your schedule please contact [facility] NEPH[facility]OLOG[facility] directly at [phone].  Normal or non-critical lab and imaging results will be communicated to you by MyChart, letter or phone within 4 business days after the clinic has received the results. If you do not hear from us within 7 days, please contact the clinic through [employer] or phone. If you have a critical or abnormal lab result, we will notify you by phone as soon as possible.  Submit refill requests through [employer] or call your pharmacy and they will forward the refill request to us. Please allow 3 business days for your refill to be completed.          Additional Information About Your Visit        MyChart Information     [employer] gives you secure access to your electronic health record. If you see a primary care provider, you can also send messages to your care team and make appointments. If you have questions, please call your primary care clinic.  If you do not have a primary care provider, please call [phone] and they will assist you.        Care EveryWhere ID     This is your Care EveryWhere ID. This could be used by other organizations to access your [place] medical records  [id number]        Your Vitals Were     Pulse Height Pulse  "Oximetry BMI (Body Mass Index)          54 1.662 m (5' 5.45\") 99% 31.58 kg/m2         Blood Pressure from Last 3 Encounters:   05/15/18 189/74   02/08/17 186/78   10/28/13 132/73    Weight from Last 3 Encounters:   05/15/18 87.3 kg (192 lb 6.4 oz)   02/08/17 88.5 kg (195 lb 3.2 oz)              Today, you had the following     No orders found for display         Today's Medication Changes          These changes are accurate as of 5/15/18 10:20 AM.  If you have any questions, ask your nurse or doctor.               Stop taking these medicines if you haven't already. Please contact your care team if you have questions.     UNKNOWN TO PATIENT   Stopped by:  Alberto Murphy MD                    Primary Care Provider Office Phone # Fax #    Paul Osman [phone]       XXX RETIRED XXX  XXX [place] [address]        Equal Access to Services     [facility]: Hadii aad ku hadasho Soomaali, waaxda luqadaha, qaybta kaalmada adeegyada, waxay idiin haybernardan mercedes nye . So [facility] [phone].    ATENCIÓN: Si habla español, tiene a helms disposición servicios gratuitos de asistencia lingüística. Llame al [phone].    We comply with applicable federal civil rights laws and [place] laws. We do not discriminate on the basis of race, color, national origin, age, disability, sex, sexual orientation, or gender identity.            Thank you!     Thank you for choosing [facility] NEPHROLOGY  for your care. Our goal is always to provide you with excellent care. Hearing back from our patients is one way we can continue to improve our services. Please take a few minutes to complete the written survey that you may receive in the mail after your visit with us. Thank you!             Your Updated Medication List - Protect others around you: Learn how to safely use, store and throw away your medicines at www.disposemymeds.org.          This list is accurate as of 5/15/18 10:20 AM.  Always use your most recent " med list.                   Brand Name Dispense Instructions for use Diagnosis    azaTHIOprine 50 MG tablet    IMURAN    315 tablet    Take 3.5 tablets (175 mg) by mouth daily    Kidney replaced by transplant, Pancreas replaced by transplant (H)       B-D U/F 31G X 8 MM   Generic drug:  insulin pen needle      USE TO INJECT WITH INSULIN PEN DEVICE        blood glucose monitoring test strip    no brand specified     TEST STRIPS COVERED BY PT INS. 250.00 IDDM TYPE II - TEST 3 TIMES/DAY.        * cycloSPORINE 25 MG capsule    sandIMMUNE    60 capsule    Take 1 capsule (25 mg) by mouth 2 times daily Total dose 125 mg twice daily    Kidney replaced by transplant, Pancreas replaced by transplant (H)       * cycloSPORINE 100 MG capsule    sandIMMUNE    60 capsule    Take 1 capsule (100 mg) by mouth 2 times daily    Kidney replaced by transplant, Pancreas replaced by transplant (H)       fluticasone 50 MCG/ACT spray    FLONASE          GLUCOCOM BLOOD GLUCOSE MONITOR Destiny      Dispense glucose meter, test strips and lancets covered by the patient insurance. Test 3 times per day.        insulin aspart 100 UNIT/ML injection    NovoLOG PEN     INJECT 8UNITS BEFORE BREAKFAST AND LUNCH AND 8  UNITS WITH SUPPER        insulin glargine 100 UNIT/ML injection    LANTUS     Inject 15 Units Subcutaneous        METOPROLOL SUCCINATE ER PO      Take 100 mg by mouth daily XL        SODIUM BICARBONATE PO      Take 0.5 teaspoonful by mouth daily        * Notice:  This list has 2 medication(s) that are the same as other medications prescribed for you. Read the directions carefully, and ask your doctor or other care provider to review them with you.

## 2018-05-15 NOTE — PATIENT INSTRUCTIONS
Check cuff pressure at home with clinic pressure.     Goal is 100-130/60-80.    Labs every 2-3 months.     Follow up in 12 months.    Continue follow up with dermatology.     Call with questions.

## 2018-05-15 NOTE — LETTER
Date:May 16, 2018      Patient was self referred, no letter generated. Do not send.        [facility] Health Information

## 2018-05-16 ENCOUNTER — TELEPHONE (OUTPATIENT)
Dept: TRANSPLANT | Facility: CLINIC | Age: 63
End: 2018-05-16

## 2018-05-16 NOTE — TELEPHONE ENCOUNTER
Patient Call: Transplant Lab/Orders  Route to LPN  Post Transplant Days: [id number]  When patient is less than 60 days post-transplant, route high priority    Reason for Call: Discuss lab results; which results? General- labs have been varying  Callback needed? Yes    Return Call Needed  Same as documented in contacts section  When to return call?: Same day: Route High Priority

## 2018-05-23 ENCOUNTER — TELEPHONE (OUTPATIENT)
Dept: TRANSPLANT | Facility: CLINIC | Age: 63
End: 2018-05-23

## 2018-05-23 NOTE — TELEPHONE ENCOUNTER
Patient Call: General  Route to LPN    Reason for call: Patient wanted to speak with someone about his most recent cyclosporine levels.    Call back needed? Yes    Return Call Needed  Same as documented in contacts section  When to return call?: Greater than one day: Route standard priority

## 2018-05-24 NOTE — TELEPHONE ENCOUNTER
Call placed back to pt    All labs and drug levels are wnl - pt verbalizes understanding of this

## 2018-09-19 DIAGNOSIS — Z48.298 AFTERCARE FOLLOWING ORGAN TRANSPLANT: ICD-10-CM

## 2018-09-19 PROCEDURE — 80158 DRUG ASSAY CYCLOSPORINE: CPT | Performed by: INTERNAL MEDICINE

## 2018-09-21 LAB
CYCLOSPORINE BLD LC/MS/MS-MCNC: 76 UG/L (ref 50–400)
TME LAST DOSE: NORMAL H

## 2019-01-23 DIAGNOSIS — Z48.298 AFTERCARE FOLLOWING ORGAN TRANSPLANT: ICD-10-CM

## 2019-01-23 PROCEDURE — 80158 DRUG ASSAY CYCLOSPORINE: CPT | Performed by: INTERNAL MEDICINE

## 2019-01-24 ENCOUNTER — TELEPHONE (OUTPATIENT)
Dept: TRANSPLANT | Facility: CLINIC | Age: 64
End: 2019-01-24

## 2019-01-24 LAB
CYCLOSPORINE BLD LC/MS/MS-MCNC: 119 UG/L (ref 50–400)
TME LAST DOSE: NORMAL H

## 2019-01-24 NOTE — TELEPHONE ENCOUNTER
"ISSUE:  CSA elevated 119 (s/b )    PLAN:  Call placed to pt. He states he has been on the same dose \"forever\" and doesn't wish to reduce at this time. I asked that he repeat CSA level. He states that he pays out of pocket for labs, so he only goes every 3 months or so. I explained that it is the recommendation that he repeat sooner than that. Orders placed.  Pt verbalizes understanding of plan    "

## 2019-01-30 DIAGNOSIS — Z94.83 PANCREAS REPLACED BY TRANSPLANT (H): Primary | ICD-10-CM

## 2019-01-30 DIAGNOSIS — Z94.0 KIDNEY REPLACED BY TRANSPLANT: ICD-10-CM

## 2019-01-30 RX ORDER — AZATHIOPRINE 50 MG/1
175 TABLET ORAL DAILY
Qty: 315 TABLET | Refills: 3 | Status: SHIPPED | OUTPATIENT
Start: 2019-01-30 | End: 2020-03-16

## 2019-04-19 DIAGNOSIS — Z94.83 PANCREAS REPLACED BY TRANSPLANT (H): ICD-10-CM

## 2019-04-19 DIAGNOSIS — Z94.0 KIDNEY REPLACED BY TRANSPLANT: ICD-10-CM

## 2019-04-19 RX ORDER — CYCLOSPORINE 25 MG/1
25 CAPSULE, LIQUID FILLED ORAL 2 TIMES DAILY
Qty: 60 CAPSULE | Refills: 1 | Status: SHIPPED | OUTPATIENT
Start: 2019-04-19 | End: 2020-02-17

## 2019-04-19 RX ORDER — CYCLOSPORINE 100 MG/1
CAPSULE, LIQUID FILLED ORAL
Qty: 60 CAPSULE | Refills: 7 | Status: SHIPPED | OUTPATIENT
Start: 2019-04-19 | End: 2020-02-17

## 2019-04-19 RX ORDER — CYCLOSPORINE 100 MG/1
100 CAPSULE, LIQUID FILLED ORAL 2 TIMES DAILY
Qty: 60 CAPSULE | Refills: 1 | Status: SHIPPED | OUTPATIENT
Start: 2019-04-19 | End: 2020-02-17

## 2019-04-19 RX ORDER — CYCLOSPORINE 25 MG/1
CAPSULE, LIQUID FILLED ORAL
Qty: 60 CAPSULE | Refills: 8 | Status: SHIPPED | OUTPATIENT
Start: 2019-04-19 | End: 2020-02-17

## 2019-05-08 ENCOUNTER — TELEPHONE (OUTPATIENT)
Dept: TRANSPLANT | Facility: CLINIC | Age: 64
End: 2019-05-08

## 2019-05-08 NOTE — TELEPHONE ENCOUNTER
Patient left a VM message on 5/8/19 at 2:20 pm need new lab order Patient would like a return call.

## 2019-05-08 NOTE — LETTER
OUTPATIENT LABORATORY TEST ORDER:  After First Year    Patient Name: Mathew Vaughan                           Transplant Date: 7-9-1989  YOB: 1955                                   Issue Date & Time: 5/8/2019  3:04 AM    [facility] MR: [id number]                                   Exp. Date (1 year after date issued)    Diagnoses:   Kidney Transplant (ICD-10  Z94.0)      Pancreas Transplant (ICD-10  Z94.83)     Long term use of medications (ICD-10  Z79.899)              Lab results to be available on the same day drawn.   Please release results to patient upon their request    Please fax to the Transplant Center at [phone].      Monthly   Complete Blood Count with Platelets    Basic Metabolic Panel (Sodium, Potassium, Chloride, CO2, Creatinine, Urea Nitrogen, Glucose, Calcium)   Amylase, Lipase   Cyclosporine    Every 6 months,     Due: April and October            BK PCR Quantitative/Serum               Complete Lipid Panel fasting (Cholesterol, Triglycerides, HDL, LDL)            Glycosylated Hemoglobin (A1c)              Urine for Protein/Creatinine      If you have any questions, please call [facility] at [phone] or [phone].      .

## 2019-09-06 ENCOUNTER — TRANSFERRED RECORDS (OUTPATIENT)
Dept: HEALTH INFORMATION MANAGEMENT | Facility: CLINIC | Age: 64
End: 2019-09-06

## 2019-10-05 ENCOUNTER — HEALTH MAINTENANCE LETTER (OUTPATIENT)
Age: 64
End: 2019-10-05

## 2020-02-10 ENCOUNTER — HEALTH MAINTENANCE LETTER (OUTPATIENT)
Age: 65
End: 2020-02-10

## 2020-02-17 DIAGNOSIS — Z94.83 PANCREAS REPLACED BY TRANSPLANT (H): ICD-10-CM

## 2020-02-17 DIAGNOSIS — Z94.0 KIDNEY REPLACED BY TRANSPLANT: Primary | ICD-10-CM

## 2020-02-17 RX ORDER — CYCLOSPORINE 25 MG/1
25 CAPSULE, LIQUID FILLED ORAL 2 TIMES DAILY
Qty: 60 CAPSULE | Refills: 0 | Status: SHIPPED | OUTPATIENT
Start: 2020-02-17 | End: 2020-03-24

## 2020-02-17 RX ORDER — CYCLOSPORINE 100 MG/1
100 CAPSULE, LIQUID FILLED ORAL 2 TIMES DAILY
Qty: 60 CAPSULE | Refills: 0 | Status: SHIPPED | OUTPATIENT
Start: 2020-02-17 | End: 2020-03-24

## 2020-02-17 NOTE — TELEPHONE ENCOUNTER
The federal rules and regulations that govern the refilling of medications by physicians and pharmacies   no longer allow us to refill mediations without a yearly face-to-face visit between the patient and physician.      LPN task:  Call and invite back for Annual face-to-face appointment with Transplant Nephrology.  Send a message to Scheduling pool to set-up visit.  Please send a letter if unable to discuss on the phone.    Rx for 1 month filled.

## 2020-02-17 NOTE — LETTER
Mathew Vaughan  [address]  [place] [address]                February 17, 2020    Dear Mathew,    This letter is regarding your medication refills.    For the best outcome for your transplant and in order for us to refill your prescriptions, we encourage you to have a yearly clinic appointment with a physician and to have current labs. If you are unable to return to our transplant center there are several alternatives. Please call your coordinator to discuss them. .  To make an appointment with a physician here at [facility] [place], please call:  The Transplant Office at [phone] or [phone].  .      The Transplant Staff at [facility] [place]

## 2020-02-27 ENCOUNTER — TELEPHONE (OUTPATIENT)
Dept: TRANSPLANT | Facility: CLINIC | Age: 65
End: 2020-02-27

## 2020-02-27 DIAGNOSIS — Z94.0 KIDNEY TRANSPLANTED: Primary | ICD-10-CM

## 2020-02-27 DIAGNOSIS — Z94.0 IMMUNOSUPPRESSIVE MANAGEMENT ENCOUNTER FOLLOWING KIDNEY TRANSPLANT: ICD-10-CM

## 2020-02-27 DIAGNOSIS — Z48.298 AFTERCARE FOLLOWING ORGAN TRANSPLANT: ICD-10-CM

## 2020-02-27 DIAGNOSIS — Z79.899 IMMUNOSUPPRESSIVE MANAGEMENT ENCOUNTER FOLLOWING KIDNEY TRANSPLANT: ICD-10-CM

## 2020-02-27 DIAGNOSIS — T86.10 COMPLICATIONS, KIDNEY TRANSPLANT: ICD-10-CM

## 2020-02-27 NOTE — TELEPHONE ENCOUNTER
Potassium = 5.3 (2/26/20)  Creatinine = 1.6 (2/26/20)  Baseline 1.2-1.3    PLAN:  Call Mathew Vaughan and check how patient is feeling?   Having cough/cold/congestion?   Nausea/Vomiting?  Diarrhea?   Dehydration?   Missed medication?  Changes in medication, (benjy diuretics)?  If no cardiac issues, instruct to improve hydration, eat LESS of Potassium rich food and recheck BMP in 1 - 2  weeks.    OUTCOME:  Left   Mailed Copy of standing lab orders to [facility].

## 2020-03-10 ENCOUNTER — TELEPHONE (OUTPATIENT)
Dept: TRANSPLANT | Facility: CLINIC | Age: 65
End: 2020-03-10

## 2020-03-10 NOTE — TELEPHONE ENCOUNTER
Patient Call: General  Route to LPN    Reason for call: Pt wants to make sure his CSA is generic  He had been paying $180  And now $480 dollars or his RX  Is back tracking to see when it went up  If t is thru his insurance he will change plans      Call back needed? Yes    Return Call Needed  Same as documented in contacts section  When to return call?: Greater than one day: Route standard priority

## 2020-03-11 NOTE — TELEPHONE ENCOUNTER
Call placed to patient. Patient v\u that [facility] is sending generic CSA. Patient will f\u with his insurance company.

## 2020-03-16 DIAGNOSIS — Z94.0 KIDNEY REPLACED BY TRANSPLANT: Primary | ICD-10-CM

## 2020-03-16 DIAGNOSIS — Z94.83 PANCREAS REPLACED BY TRANSPLANT (H): ICD-10-CM

## 2020-03-16 RX ORDER — AZATHIOPRINE 50 MG/1
175 TABLET ORAL DAILY
Qty: 315 TABLET | Refills: 0 | Status: SHIPPED | OUTPATIENT
Start: 2020-03-16 | End: 2020-06-29

## 2020-03-24 DIAGNOSIS — Z94.83 PANCREAS REPLACED BY TRANSPLANT (H): ICD-10-CM

## 2020-03-24 DIAGNOSIS — Z94.0 KIDNEY REPLACED BY TRANSPLANT: Primary | ICD-10-CM

## 2020-03-24 RX ORDER — CYCLOSPORINE 100 MG/1
100 CAPSULE, LIQUID FILLED ORAL 2 TIMES DAILY
Qty: 60 CAPSULE | Refills: 0 | Status: SHIPPED | OUTPATIENT
Start: 2020-03-24 | End: 2020-04-29

## 2020-03-24 RX ORDER — CYCLOSPORINE 25 MG/1
25 CAPSULE, LIQUID FILLED ORAL 2 TIMES DAILY
Qty: 60 CAPSULE | Refills: 0 | Status: SHIPPED | OUTPATIENT
Start: 2020-03-24 | End: 2020-04-29

## 2020-03-24 NOTE — TELEPHONE ENCOUNTER
The federal rules and regulations that govern the refilling of medications by physicians and pharmacies   no longer allow us to refill mediations without a yearly face-to-face visit between the patient and physician.      LPN task:  Call and invite back for Annual face-to-face appointment with Transplant Nephrology.  Send a message to Scheduling pool to set-up visit. (June or July 2020)  Please send a letter if unable to discuss on the phone.  Rx for CSA filled (2 months worth).

## 2020-04-06 ENCOUNTER — TRANSFERRED RECORDS (OUTPATIENT)
Dept: HEALTH INFORMATION MANAGEMENT | Facility: CLINIC | Age: 65
End: 2020-04-06

## 2020-04-29 DIAGNOSIS — Z94.83 PANCREAS REPLACED BY TRANSPLANT (H): ICD-10-CM

## 2020-04-29 DIAGNOSIS — Z94.0 KIDNEY REPLACED BY TRANSPLANT: ICD-10-CM

## 2020-04-29 RX ORDER — CYCLOSPORINE 25 MG/1
CAPSULE, LIQUID FILLED ORAL
Qty: 60 CAPSULE | Refills: 0 | Status: SHIPPED | OUTPATIENT
Start: 2020-04-29 | End: 2020-07-10

## 2020-04-29 RX ORDER — CYCLOSPORINE 100 MG/1
CAPSULE, LIQUID FILLED ORAL
Qty: 60 CAPSULE | Refills: 0 | Status: SHIPPED | OUTPATIENT
Start: 2020-04-29 | End: 2020-07-10

## 2020-04-29 NOTE — TELEPHONE ENCOUNTER
LPN task:  Call and invite back for Annual face-to-face appointment with Transplant Nephrology.  Send a message to Scheduling pool to set-up visit.  Please discuss that he needs to schedule now to get an appointment in June or July.  I have left him a message to recheck his labs d/t creatinine being elevated from baseline.  Ask him if he has completed this, if not recommend drinking more water and rechecking labs.    Rx for  mg and 25 mg stock sent for 1 month, no refills.  '

## 2020-06-29 DIAGNOSIS — Z94.0 KIDNEY REPLACED BY TRANSPLANT: ICD-10-CM

## 2020-06-29 DIAGNOSIS — Z94.83 PANCREAS REPLACED BY TRANSPLANT (H): Primary | ICD-10-CM

## 2020-06-29 RX ORDER — AZATHIOPRINE 50 MG/1
175 TABLET ORAL DAILY
Qty: 315 TABLET | Refills: 0 | Status: SHIPPED | OUTPATIENT
Start: 2020-06-29 | End: 2020-12-17

## 2020-06-29 NOTE — TELEPHONE ENCOUNTER
Rx for Aza sent (3 month + 0 refills)    Last seen May 2018 by Dr. Murphy.  Last Lab Feb 2020.    LPN task:  Call and invite back for Annual appointment with Transplant Nephrology (Video Visit).  Send a message to Scheduling pool to set-up visit.  Mathew also needs to complete labs. Offer [facility].  Please send a letter if unable to discuss on the phone.

## 2020-07-10 DIAGNOSIS — Z94.0 KIDNEY REPLACED BY TRANSPLANT: ICD-10-CM

## 2020-07-10 DIAGNOSIS — Z94.83 PANCREAS REPLACED BY TRANSPLANT (H): ICD-10-CM

## 2020-07-10 RX ORDER — CYCLOSPORINE 100 MG/1
CAPSULE, LIQUID FILLED ORAL
Qty: 60 CAPSULE | Refills: 0 | Status: SHIPPED | OUTPATIENT
Start: 2020-07-10 | End: 2020-09-14

## 2020-07-10 RX ORDER — CYCLOSPORINE 25 MG/1
CAPSULE, LIQUID FILLED ORAL
Qty: 60 CAPSULE | Refills: 0 | Status: SHIPPED | OUTPATIENT
Start: 2020-07-10 | End: 2020-09-21

## 2020-07-10 NOTE — TELEPHONE ENCOUNTER
Last seen May 2018  Last Lab Feb 2020      LPN task:  Call and invite back for Annual appointment with Transplant Nephrology (Televisit). Send a message to Scheduling pool to set-up visit.   Please send a letter if unable to discuss on the phone.  Otherwise patient can see a local Nephrologist who agrees to prescribe transplant medications for him/her and we can co-manage.  Please discuss completing monthly labs. Creatinine was elevated in Feb and was left a VM to recheck.    Rx filled for CSA (1 month + 0 refills)

## 2020-09-14 DIAGNOSIS — Z94.83 PANCREAS REPLACED BY TRANSPLANT (H): ICD-10-CM

## 2020-09-14 DIAGNOSIS — Z94.0 KIDNEY REPLACED BY TRANSPLANT: Primary | ICD-10-CM

## 2020-09-14 RX ORDER — CYCLOSPORINE 100 MG/1
100 CAPSULE, LIQUID FILLED ORAL 2 TIMES DAILY
Qty: 60 CAPSULE | Refills: 0 | Status: SHIPPED | OUTPATIENT
Start: 2020-09-14 | End: 2020-11-20

## 2020-09-21 DIAGNOSIS — Z94.83 PANCREAS REPLACED BY TRANSPLANT (H): Primary | ICD-10-CM

## 2020-09-21 DIAGNOSIS — Z94.0 KIDNEY REPLACED BY TRANSPLANT: ICD-10-CM

## 2020-09-21 NOTE — LETTER
Mathew Vaughan  [address]  [place] [address]                September 23, 2020    This letter is regarding your medication refills.    For the best outcome for your transplant and in order for us to refill your prescriptions, we encourage you to have a yearly clinic appointment with a physician and to have current labs. If you are unable to return to our transplant center there are several alternatives. Please call your coordinator to discuss them. .  To make an appointment with a physician here at [facility], please call:  The Transplant Office at [phone] or [phone].  .      The Transplant Staff at [facility]

## 2020-09-22 RX ORDER — CYCLOSPORINE 25 MG/1
25 CAPSULE, LIQUID FILLED ORAL 2 TIMES DAILY
Qty: 60 CAPSULE | Refills: 1 | Status: SHIPPED | OUTPATIENT
Start: 2020-09-22 | End: 2020-11-20

## 2020-09-22 NOTE — TELEPHONE ENCOUNTER
Last seen May 2018  Last Lab Feb 2020    LPN task:  Call and invite back for Annual appointment with Transplant Nephrology (Video Visit).  Send a message to Scheduling pool to set-up visit.  Please send a letter if unable to discuss on the phone.     Rx for CSA sent (1 month, 1 refill)

## 2020-11-14 ENCOUNTER — HEALTH MAINTENANCE LETTER (OUTPATIENT)
Age: 65
End: 2020-11-14

## 2020-11-20 DIAGNOSIS — Z94.0 KIDNEY REPLACED BY TRANSPLANT: ICD-10-CM

## 2020-11-20 DIAGNOSIS — Z94.83 PANCREAS REPLACED BY TRANSPLANT (H): ICD-10-CM

## 2020-11-20 RX ORDER — CYCLOSPORINE 25 MG/1
25 CAPSULE, LIQUID FILLED ORAL 2 TIMES DAILY
Qty: 60 CAPSULE | Refills: 0 | Status: SHIPPED | OUTPATIENT
Start: 2020-11-20 | End: 2021-01-29

## 2020-11-20 RX ORDER — CYCLOSPORINE 100 MG/1
100 CAPSULE, LIQUID FILLED ORAL 2 TIMES DAILY
Qty: 60 CAPSULE | Refills: 0 | Status: SHIPPED | OUTPATIENT
Start: 2020-11-20 | End: 2021-01-29

## 2020-12-03 ENCOUNTER — TELEPHONE (OUTPATIENT)
Dept: TRANSPLANT | Facility: CLINIC | Age: 65
End: 2020-12-03

## 2020-12-03 NOTE — TELEPHONE ENCOUNTER
Patient Call: Voicemail  Date/Time: 12/3/20 308pm  Reason for call: [facility] pharmacy left a message requesting a call back re: cyclosporine. They note that patient also has a Rx for Azathioprine and would like to make sure we are aware of interactions. They requested a call back at [phone] x [phone]

## 2020-12-04 NOTE — TELEPHONE ENCOUNTER
LPN task:  Please call back [facility] pharmacy and make them aware patient is on both CSA and AZA for immunosuppression.

## 2020-12-04 NOTE — TELEPHONE ENCOUNTER
Call returned to [facility]. Pharmacist v\u that patient is on CSA and Azathioprine.

## 2020-12-17 DIAGNOSIS — Z94.83 PANCREAS REPLACED BY TRANSPLANT (H): Primary | ICD-10-CM

## 2020-12-17 DIAGNOSIS — Z94.0 KIDNEY REPLACED BY TRANSPLANT: ICD-10-CM

## 2020-12-17 RX ORDER — AZATHIOPRINE 50 MG/1
175 TABLET ORAL DAILY
Qty: 315 TABLET | Refills: 0 | Status: SHIPPED | OUTPATIENT
Start: 2020-12-17 | End: 2021-03-18

## 2021-01-28 DIAGNOSIS — Z94.83 PANCREAS REPLACED BY TRANSPLANT (H): ICD-10-CM

## 2021-01-28 DIAGNOSIS — Z94.0 KIDNEY REPLACED BY TRANSPLANT: ICD-10-CM

## 2021-01-29 RX ORDER — CYCLOSPORINE 25 MG/1
CAPSULE, LIQUID FILLED ORAL
Qty: 60 CAPSULE | Refills: 0 | Status: SHIPPED | OUTPATIENT
Start: 2021-01-29 | End: 2021-04-08

## 2021-01-29 RX ORDER — CYCLOSPORINE 100 MG/1
CAPSULE, LIQUID FILLED ORAL
Qty: 60 CAPSULE | Refills: 0 | Status: SHIPPED | OUTPATIENT
Start: 2021-01-29 | End: 2021-04-08

## 2021-01-29 NOTE — TELEPHONE ENCOUNTER
Last seen May 15, 2018 - Dr. Murphy  Last lab Feb 26, 2020    LPN task:  Call and invite back for Annual  appointment with Transplant Nephrology (Virtual).  Send a message to Scheduling pool to set-up visit.  Please send a letter if unable to discuss on the phone.  Discuss importance of Quarterly labs (Panc failed).

## 2021-02-16 ENCOUNTER — DOCUMENTATION ONLY (OUTPATIENT)
Dept: CARE COORDINATION | Facility: CLINIC | Age: 66
End: 2021-02-16

## 2021-03-16 DIAGNOSIS — Z79.899 IMMUNOSUPPRESSIVE MANAGEMENT ENCOUNTER FOLLOWING KIDNEY TRANSPLANT: ICD-10-CM

## 2021-03-16 DIAGNOSIS — Z48.298 AFTERCARE FOLLOWING ORGAN TRANSPLANT: ICD-10-CM

## 2021-03-16 DIAGNOSIS — Z94.0 KIDNEY TRANSPLANTED: ICD-10-CM

## 2021-03-16 DIAGNOSIS — T86.10 COMPLICATIONS, KIDNEY TRANSPLANT: ICD-10-CM

## 2021-03-16 DIAGNOSIS — Z94.0 IMMUNOSUPPRESSIVE MANAGEMENT ENCOUNTER FOLLOWING KIDNEY TRANSPLANT: ICD-10-CM

## 2021-03-16 LAB
ANION GAP SERPL CALCULATED.3IONS-SCNC: 3 MMOL/L (ref 3–14)
BUN SERPL-MCNC: 22 MG/DL (ref 7–30)
CALCIUM SERPL-MCNC: 9.2 MG/DL (ref 8.5–10.1)
CHLORIDE SERPL-SCNC: 108 MMOL/L (ref 94–109)
CO2 SERPL-SCNC: 28 MMOL/L (ref 20–32)
CREAT SERPL-MCNC: 1.38 MG/DL (ref 0.66–1.25)
CYCLOSPORINE BLD LC/MS/MS-MCNC: 94 UG/L (ref 50–400)
ERYTHROCYTE [DISTWIDTH] IN BLOOD BY AUTOMATED COUNT: 15.2 % (ref 10–15)
GFR SERPL CREATININE-BSD FRML MDRD: 53 ML/MIN/{1.73_M2}
GLUCOSE SERPL-MCNC: 107 MG/DL (ref 70–99)
HCT VFR BLD AUTO: 33.5 % (ref 40–53)
HGB BLD-MCNC: 11.7 G/DL (ref 13.3–17.7)
MCH RBC QN AUTO: 39.1 PG (ref 26.5–33)
MCHC RBC AUTO-ENTMCNC: 34.9 G/DL (ref 31.5–36.5)
MCV RBC AUTO: 112 FL (ref 78–100)
PLATELET # BLD AUTO: 230 10E9/L (ref 150–450)
POTASSIUM SERPL-SCNC: 4.9 MMOL/L (ref 3.4–5.3)
RBC # BLD AUTO: 2.99 10E12/L (ref 4.4–5.9)
SODIUM SERPL-SCNC: 139 MMOL/L (ref 133–144)
TME LAST DOSE: NORMAL H
WBC # BLD AUTO: 4.9 10E9/L (ref 4–11)

## 2021-03-16 PROCEDURE — 36415 COLL VENOUS BLD VENIPUNCTURE: CPT | Performed by: INTERNAL MEDICINE

## 2021-03-16 PROCEDURE — 85027 COMPLETE CBC AUTOMATED: CPT | Performed by: INTERNAL MEDICINE

## 2021-03-16 PROCEDURE — 80048 BASIC METABOLIC PNL TOTAL CA: CPT | Performed by: INTERNAL MEDICINE

## 2021-03-16 PROCEDURE — 80158 DRUG ASSAY CYCLOSPORINE: CPT | Performed by: INTERNAL MEDICINE

## 2021-03-18 ENCOUNTER — OFFICE VISIT (OUTPATIENT)
Dept: NEPHROLOGY | Facility: CLINIC | Age: 66
End: 2021-03-18
Attending: INTERNAL MEDICINE
Payer: COMMERCIAL

## 2021-03-18 VITALS
OXYGEN SATURATION: 98 % | WEIGHT: 190.3 LBS | TEMPERATURE: 98.6 F | SYSTOLIC BLOOD PRESSURE: 170 MMHG | DIASTOLIC BLOOD PRESSURE: 78 MMHG | HEART RATE: 65 BPM | BODY MASS INDEX: 31.23 KG/M2

## 2021-03-18 DIAGNOSIS — D84.9 IMMUNOSUPPRESSION (H): ICD-10-CM

## 2021-03-18 DIAGNOSIS — N18.31 STAGE 3A CHRONIC KIDNEY DISEASE (H): ICD-10-CM

## 2021-03-18 DIAGNOSIS — Z48.298 AFTERCARE FOLLOWING ORGAN TRANSPLANT: ICD-10-CM

## 2021-03-18 DIAGNOSIS — E55.9 VITAMIN D DEFICIENCY: ICD-10-CM

## 2021-03-18 DIAGNOSIS — N18.31 ANEMIA IN STAGE 3A CHRONIC KIDNEY DISEASE (H): ICD-10-CM

## 2021-03-18 DIAGNOSIS — Z94.83 PANCREAS REPLACED BY TRANSPLANT (H): ICD-10-CM

## 2021-03-18 DIAGNOSIS — Z94.0 HYPERTENSION DUE TO KIDNEY TRANSPLANT: Primary | ICD-10-CM

## 2021-03-18 DIAGNOSIS — D63.1 ANEMIA IN STAGE 3A CHRONIC KIDNEY DISEASE (H): ICD-10-CM

## 2021-03-18 DIAGNOSIS — I15.1 HYPERTENSION DUE TO KIDNEY TRANSPLANT: Primary | ICD-10-CM

## 2021-03-18 DIAGNOSIS — Z94.0 KIDNEY REPLACED BY TRANSPLANT: ICD-10-CM

## 2021-03-18 PROCEDURE — 99214 OFFICE O/P EST MOD 30 MIN: CPT | Performed by: INTERNAL MEDICINE

## 2021-03-18 RX ORDER — LISINOPRIL 40 MG/1
40 TABLET ORAL DAILY
COMMUNITY

## 2021-03-18 RX ORDER — CHOLECALCIFEROL (VITAMIN D3) 50 MCG
1 TABLET ORAL DAILY
COMMUNITY

## 2021-03-18 RX ORDER — ERGOCALCIFEROL 1.25 MG/1
50000 CAPSULE, LIQUID FILLED ORAL WEEKLY
COMMUNITY
End: 2023-08-02

## 2021-03-18 RX ORDER — AZATHIOPRINE 50 MG/1
150 TABLET ORAL DAILY
Qty: 315 TABLET | Refills: 0 | COMMUNITY
Start: 2021-03-18 | End: 2021-06-10

## 2021-03-18 RX ORDER — AMLODIPINE BESYLATE 5 MG/1
5 TABLET ORAL DAILY
Qty: 60 TABLET | Refills: 11 | Status: SHIPPED | OUTPATIENT
Start: 2021-03-18

## 2021-03-18 RX ORDER — CHLORTHALIDONE 25 MG/1
12.5 TABLET ORAL DAILY
Qty: 30 TABLET | Refills: 11 | Status: SHIPPED | OUTPATIENT
Start: 2021-03-18 | End: 2022-04-15

## 2021-03-18 ASSESSMENT — PAIN SCALES - GENERAL: PAINLEVEL: NO PAIN (0)

## 2021-03-18 NOTE — PATIENT INSTRUCTIONS
Start taking Amlodipine 5 mg every evening. (prescription sent to your [facility] pharmacy)  Start taking Chlorthalidone 12.5 (half a tab) in the morning.  Continue taking lisinopril 40 mg daily and metoprolol 100 mg daily.   Continue to measure your blood pressor at home every day.

## 2021-03-18 NOTE — PROGRESS NOTES
CHRONIC TRANSPLANT NEPHROLOGY VISIT    Assessment & Plan   # DDKT (SPK): Stable   - Baseline Creatinine:  ~ 1.2-1.3   - Proteinuria: Minimal (0.2-0.5 grams)   - Date DSA Last Checked: Not Known      Latest DSA: Not checked recently due to time from transplant   - BK Viremia: Not checked recently due to time from transplant   - Kidney Tx Biopsy: No    # Immunosuppression: Cyclosporine (goal ) and Azathioprine (dose 150 mg mg daily)          - Continue with intensive monitoring of immunosuppression for efficacy and toxicity.          - Changes: No    # Infection Prophylaxis:   - PJP: None    # Hypertension: Inadequate control;  Goal BP: < 130/80   - Changes: Yes -   Start Amlodipine 5 mg every evening and Chlorthalidone 12.5 mg (half a tab) in the morning. Continue taking lisinopril 40 mg daily and Toprol  mg daily.  Advised daily BP measurements. Proper measurement technique discussed with the pt. Will call him next week for follow up on BP response.     # Diabetes, s/p Failed Pancreas Tx (SPK): Borderline control (HbA1c 7-9%) Last HbA1c: 7.2%   - Management as per primary care.    # Anemia in Chronic Renal Disease: Hgb: Stable, near normal      MONICA: No   - Iron studies: Not checked recently    # Mineral Bone Disorder:   - Secondary renal hyperparathyroidism; PTH level: Not checked recently        On treatment: None  - Vitamin D; level: Low        On supplement: Yes on vitamin D2 50,000 international unit(s) weekly and vitamin D3 2,000 daily.   - Calcium; level: Normal        On supplement: No    # Electrolytes:   - Potassium; level: Normal        On supplement: No  - Bicarbonate; level: High normal        On supplement: Yes, advised the pt to reduce his bicarb (baking soda) to 50 %.     # Skin Cancer: New lesions: one   - Discussed sun protection and recommend regular follow up with Dermatology.    # Medical Compliance: Yes    # Transplant History:  Etiology of Kidney Failure: Diabetes mellitus type  1  Tx: ANANTK  Transplant: 7/9/1989 (Kidney / Pancreas)  Significant changes in immunosuppression: None  Significant transplant-related complications: None    Transplant Office Phone Number: [phone]    Assessment and plan was discussed with the patient and he voiced his understanding and agreement.    Return visit: Return in about 1 year (around 3/18/2022).    Turner Verma MD     Attestation:  This patient has been seen and evaluated by me, Mateo Love MD.  I have reviewed the note and agree with plan of care as documented by the fellow.       Chief Complaint   Mr. Vaughan is a 65 year old here for kidney transplant and immunosuppression management.    Interval:   Pt is doing well overall. He has gained weight , about 30 lb over last few years, he attributes to sedentary lifestyle, insulin requirements also increased over the years.. His BP has been out of control, home BP measurements have been 150-160/. He takes lisinopril 40 mg and Toprol  mg daily. He does have some leg swelling. He denies an SOB or orthopnea. He denies any N/V/D/F/C or dizziness. He sees dermatology regularly but hasn't seen his dermatologist in 14 months, he states because of the pandemic but he will schedule an appointment soon. He states he gets a new diagnosis of skin cancer about once per year.     Home BP: 150-160/. Today's BP in the clinic is 170/78    Problem List   Patient Active Problem List   Diagnosis     Diabetes mellitus type 1 (H)     Kidney replaced by transplant     Pancreas replaced by transplant (H)     Skin cancer     HTN, kidney transplant related     Aftercare following organ transplant     Chronic kidney disease, stage 3     Immunosuppression (H)     Vitamin D deficiency     Anemia in chronic renal disease       Allergies   No Known Allergies    Medications   Current Outpatient Medications   Medication Sig     amLODIPine (NORVASC) 5 MG tablet Take 1 tablet (5 mg) by mouth daily      azaTHIOprine (IMURAN) 50 MG tablet Take 3 tablets (150 mg) by mouth daily     blood glucose monitoring (NO BRAND SPECIFIED) test strip TEST STRIPS COVERED BY PT INS. 250.00 IDDM TYPE II - TEST 3 TIMES/DAY.     Blood Glucose Monitoring Suppl (GLUCOCOM BLOOD GLUCOSE MONITOR) RAIN Dispense glucose meter, test strips and lancets covered by the patient insurance. Test 3 times per day.     chlorthalidone (HYGROTON) 25 MG tablet Take 0.5 tablets (12.5 mg) by mouth daily     cycloSPORINE modified (GENERIC EQUIVALENT) 100 MG capsule TAKE 1 CAPSULE BY MOUTH TWICE DAILY. (TOTAL DAILY DOSE 125 MG TWICE DAILY.)     cycloSPORINE modified (GENERIC EQUIVALENT) 25 MG capsule TAKE 1 CAPSULE BY MOUTH TWICE DAILY. (TOTAL DAILY DOSE 125MG TWICE DAILY.)     fluticasone (FLONASE) 50 MCG/ACT spray      insulin aspart (NOVOLOG PEN) 100 UNIT/ML injection INJECT 8UNITS BEFORE BREAKFAST AND LUNCH AND 8  UNITS WITH SUPPER     insulin glargine (LANTUS SOLOSTAR) 100 UNIT/ML pen Inject 15 Units Subcutaneous     insulin pen needle (B-D U/F) 31G X 8 MM USE TO INJECT WITH INSULIN PEN DEVICE     lisinopril (ZESTRIL) 40 MG tablet Take 40 mg by mouth daily     METOPROLOL SUCCINATE ER PO Take 100 mg by mouth daily XL     SODIUM BICARBONATE PO Take 0.5 teaspoonful by mouth daily     vitamin D2 (ERGOCALCIFEROL) 59638 units (1250 mcg) capsule Take 50,000 Units by mouth once a week     vitamin D3 (CHOLECALCIFEROL) 50 mcg (2000 units) tablet Take 1 tablet by mouth daily     No current facility-administered medications for this visit.      Medications Discontinued During This Encounter   Medication Reason     azaTHIOprine (IMURAN) 50 MG tablet        Physical Exam   Vital Signs: BP (!) 170/78   Pulse 65   Temp 98.6  F (37  C)   Wt 86.3 kg (190 lb 4.8 oz)   SpO2 98%   BMI 31.23 kg/m      GENERAL APPEARANCE: alert and no distress  HENT: mouth without ulcers or lesions  LYMPHATICS: no cervical or supraclavicular nodes  RESP: lungs clear to auscultation - no  rales, rhonchi or wheezes  CV: regular rhythm, normal rate, no rub, no murmur  EDEMA: no LE edema bilaterally  ABDOMEN: soft, nondistended, nontender, bowel sounds normal  MS: extremities normal - no gross deformities noted, no evidence of inflammation in joints, no muscle tenderness  SKIN: no rash  TX KIDNEY: normal  DIALYSIS ACCESS:  LUE AV fistula no palpable thrill     Data     Renal Latest Ref Rng & Units 3/16/2021 2/26/2020 12/12/2019   Na 133 - 144 mmol/L 139 - -   Na (external) 135 - 145 mmol/L - 132(L) 138   K 3.4 - 5.3 mmol/L 4.9 - -   K (external) 3.5 - 5.0 mmol/L - 5.3(H) 4.9   Cl 94 - 109 mmol/L 108 - -   Cl (external) 98 - 110 mmol/L - 107 106   CO2 20 - 32 mmol/L 28 - -   CO2 (external) 21 - 31 mmol/L - 20(L) 26   BUN 7 - 30 mg/dL 22 - -   BUN (external) 8 - 25 mg/dL - 29(H) 31(H)   Cr 0.66 - 1.25 mg/dL 1.38(H) - -   Cr (external) 0.72 - 1.25 mg/dL - 1.62(H) 1.73(H)   Glucose 70 - 99 mg/dL 107(H) - -   Glucose (external) 65 - 100 mg/dL - 135(H) 147(H)   Ca  8.5 - 10.1 mg/dL 9.2 - -   Ca (external) 8.5 - 10.5 mg/dL - 9.2 9.4        Heme Latest Ref Rng & Units 3/16/2021 2/26/2020 12/12/2019   WBC 4.0 - 11.0 10e9/L 4.9 - -   WBC (external) 4.5 - 11.0 thou/cu mm - 5.2 5.2   Hgb 13.3 - 17.7 g/dL 11.7(L) - -   Hgb (external) 13.5 - 17.5 g/dL - 12.0(L) 11.5(L)   Plt 150 - 450 10e9/L 230 - -   Plt (external) 140 - 440 thou/cu mm - 218 243   ABSOLUTE NEUTROPHIL 1.6 - 8.3 10e9/L - - -   ABSOLUTE LYMPHOCYTES 0.8 - 5.3 10e9/L - - -   ABSOLUTE MONOCYTES 0.0 - 1.3 10e9/L - - -   ABSOLUTE EOSINOPHILS 0.0 - 0.7 10e9/L - - -   ABSOLUTE BASOPHILS 0.0 - 0.2 10e9/L - - -   ABS IMMATURE GRANULOCYTES 0 - 0.4 10e9/L - - -   ABSOLUTE NUCLEATED RBC - - - -        Pancreas Latest Ref Rng & Units 2/26/2020 12/12/2019 5/9/2019   A1C (external) <=6.4 % - - -   Amylase 30 - 110 U/L - - -   Amylase (external) 25 - 125 IU/L 85 79 90   Lipase 73 - 393 U/L - - -   Lipase (external) 25 - 125 IU/L 85 79 90     Iron studies Latest Ref  Rng & Units 5/9/2018   Iron (external) 8.0 - 78.0 IU/L 16.1     [facility] Txp Virology Latest Ref Rng & Units 9/19/2018 5/5/2017 10/7/2016   BK Quant Result Ext None detected None detected None Detected -   BK Quant Rapid Ext None detected - - None detected        Recent Labs   Lab Test 05/01/14  0750 10/28/15  0759 10/07/16  0800   DOSTAC 04/30/2014 2030 10/27/15  2015 1945 10/6/16   TACROL <3.0  Tacrolimus Reference Range   Kidney Transplant   Pediatric                      ug/L     0-3 months post transplant   10-12     3-6 months post transplant   8-10     6-12 months post transplant  6-8     >12 months post transplant   4-7   Adult     0-6 months post transplant   8-10     6-12 months post transplant  6-8     >12 months post transplant   4-6     >5 years post transplant     3-5   Heart Transplant   Pediatric     0-12 months post transplant  10-15     >12 months post transplant   5-10   Adult     0-3 months post transplant   10-15     3-6 months post transplant   8-12     6-12 months post transplant  6-12     >12 months post transplant   6-10   Lung Transplant     0-12 months post transplant  10-15     >12 months post transplant   8-12   Liver Transplant   Pediatric     0-3 months post transplant   10-15     3-6 months post transplant   8-10     >6 months post transplant    6-8   Adult     0-3 months post transplant   10-12     3-6 months post transplant   8-10     >6  months post transplant    6-8   Pancreas Transplant     0-6 months post transplant   8-10     >6 months post transplant    5-8  * <3.0  Tacrolimus Reference Range   Kidney Transplant   Pediatric                      ug/L     0-3 months post transplant   10-12     3-6 months post transplant   8-10     6-12 months post transplant  6-8     >12 months post transplant   4-7   Adult     0-6 months post transplant   8-10     6-12 months post transplant  6-8     >12 months post transplant   4-6     >5 years post transplant     3-5   Heart Transplant    Pediatric     0-12 months post transplant  10-15     >12 months post transplant   5-10   Adult     0-3 months post transplant   10-15     3-6 months post transplant   8-12     6-12 months post transplant  6-12     >12 months post transplant   6-10   Lung Transplant     0-12 months post transplant  10-15     >12 months post transplant   8-12   Liver Transplant   Pediatric     0-3 months post transplant   10-15     3-6 months post transplant   8-10     >6 months post transplant    6-8   Adult     0-3 months post transplant   10-12     3-6 months post transplant   8-10     >6  months post transplant    6-8   Pancreas Transplant     0-6 months post transplant   8-10     >6 months post transplant    5-8   This test was developed and its performance characteristics determined by the   [facility],  Special Chemistry Laboratory. It has   not been cleared or approved by the FDA. The laboratory is regulated under CLIA   as qualified to perform high-complexity testing. This test is used for clinical   purposes. It should not be regarded as investigational or for research.  * <3.0  Tacrolimus Reference Range   Kidney Transplant   Pediatric                      ug/L     0-3 months post transplant   10-12     3-6 months post transplant   8-10     6-12 months post transplant  6-8     >12 months post transplant   4-7   Adult     0-6 months post transplant   8-10     6-12 months post transplant  6-8     >12 months post transplant   4-6     >5 years post transplant     3-5   Heart Transplant   Pediatric     0-12 months post transplant  10-15     >12 months post transplant   5-10   Adult     0-3 months post transplant   10-15     3-6 months post transplant   8-12     6-12 months post transplant  6-12     >12 months post transplant   6-10   Lung Transplant     0-12 months post transplant  10-15     >12 months post transplant   8-12   Liver Transplant   Pediatric     0-3 months post transplant   10-15     3-6  months post transplant   8-10     >6 months post transplant    6-8   Adult     0-3 months post transplant   10-12     3-6 months post transplant   8-10     >6  months post transplant    6-8   Pancreas Transplant     0-6 months post transplant   8-10     >6 months post transplant    5-8   This test was developed and its performance characteristics determined by the   [facility],  Special Chemistry Laboratory. It has   not been cleared or approved by the FDA. The laboratory is regulated under CLIA   as qualified to perform high-complexity testing. This test is used for clinical   purposes. It should not be regarded as investigational or for research.  *          Turner Verma MD on 3/18/2021 at 4:24 PM

## 2021-03-18 NOTE — LETTER
3/18/2021      RE: Mathew Vaughan  [address]  [place] [address]       CHRONIC TRANSPLANT NEPHROLOGY VISIT    Assessment & Plan   # DDKT (SPK): Stable   - Baseline Creatinine:  ~ 1.2-1.3   - Proteinuria: Minimal (0.2-0.5 grams)   - Date DSA Last Checked: Not Known      Latest DSA: Not checked recently due to time from transplant   - BK Viremia: Not checked recently due to time from transplant   - Kidney Tx Biopsy: No    # Immunosuppression: Cyclosporine (goal ) and Azathioprine (dose 150 mg mg daily)          - Continue with intensive monitoring of immunosuppression for efficacy and toxicity.          - Changes: No    # Infection Prophylaxis:   - PJP: None    # Hypertension: Inadequate control;  Goal BP: < 130/80   - Changes: Yes -   Start Amlodipine 5 mg every evening and Chlorthalidone 12.5 mg (half a tab) in the morning. Continue taking lisinopril 40 mg daily and Toprol  mg daily.  Advised daily BP measurements. Proper measurement technique discussed with the pt. Will call him next week for follow up on BP response.     # Diabetes, s/p Failed Pancreas Tx (SPK): Borderline control (HbA1c 7-9%) Last HbA1c: 7.2%   - Management as per primary care.    # Anemia in Chronic Renal Disease: Hgb: Stable, near normal      MONICA: No   - Iron studies: Not checked recently    # Mineral Bone Disorder:   - Secondary renal hyperparathyroidism; PTH level: Not checked recently        On treatment: None  - Vitamin D; level: Low        On supplement: Yes on vitamin D2 50,000 international unit(s) weekly and vitamin D3 2,000 daily.   - Calcium; level: Normal        On supplement: No    # Electrolytes:   - Potassium; level: Normal        On supplement: No  - Bicarbonate; level: High normal        On supplement: Yes, advised the pt to reduce his bicarb (baking soda) to 50 %.     # Skin Cancer: New lesions: one   - Discussed sun protection and recommend regular follow up with Dermatology.    # Medical Compliance:  Yes    # Transplant History:  Etiology of Kidney Failure: Diabetes mellitus type 1  Tx: SPK  Transplant: 7/9/1989 (Kidney / Pancreas)  Significant changes in immunosuppression: None  Significant transplant-related complications: None    Transplant Office Phone Number: [phone]    Assessment and plan was discussed with the patient and he voiced his understanding and agreement.    Return visit: Return in about 1 year (around 3/18/2022).    Turner Verma MD     Attestation:  This patient has been seen and evaluated by me, Mateo Love MD.  I have reviewed the note and agree with plan of care as documented by the fellow.       Chief Complaint   Mr. Vaughan is a 65 year old here for kidney transplant and immunosuppression management.    Interval:   Pt is doing well overall. He has gained weight , about 30 lb over last few years, he attributes to sedentary lifestyle, insulin requirements also increased over the years.. His BP has been out of control, home BP measurements have been 150-160/. He takes lisinopril 40 mg and Toprol  mg daily. He does have some leg swelling. He denies an SOB or orthopnea. He denies any N/V/D/F/C or dizziness. He sees dermatology regularly but hasn't seen his dermatologist in 14 months, he states because of the pandemic but he will schedule an appointment soon. He states he gets a new diagnosis of skin cancer about once per year.     Home BP: 150-160/. Today's BP in the clinic is 170/78    Problem List   Patient Active Problem List   Diagnosis     Diabetes mellitus type 1 (H)     Kidney replaced by transplant     Pancreas replaced by transplant (H)     Skin cancer     HTN, kidney transplant related     Aftercare following organ transplant     Chronic kidney disease, stage 3     Immunosuppression (H)     Vitamin D deficiency     Anemia in chronic renal disease       Allergies   No Known Allergies    Medications   Current Outpatient Medications   Medication Sig      amLODIPine (NORVASC) 5 MG tablet Take 1 tablet (5 mg) by mouth daily     azaTHIOprine (IMURAN) 50 MG tablet Take 3 tablets (150 mg) by mouth daily     blood glucose monitoring (NO BRAND SPECIFIED) test strip TEST STRIPS COVERED BY PT INS. 250.00 IDDM TYPE II - TEST 3 TIMES/DAY.     Blood Glucose Monitoring Suppl (GLUCOCOM BLOOD GLUCOSE MONITOR) RAIN Dispense glucose meter, test strips and lancets covered by the patient insurance. Test 3 times per day.     chlorthalidone (HYGROTON) 25 MG tablet Take 0.5 tablets (12.5 mg) by mouth daily     cycloSPORINE modified (GENERIC EQUIVALENT) 100 MG capsule TAKE 1 CAPSULE BY MOUTH TWICE DAILY. (TOTAL DAILY DOSE 125 MG TWICE DAILY.)     cycloSPORINE modified (GENERIC EQUIVALENT) 25 MG capsule TAKE 1 CAPSULE BY MOUTH TWICE DAILY. (TOTAL DAILY DOSE 125MG TWICE DAILY.)     fluticasone (FLONASE) 50 MCG/ACT spray      insulin aspart (NOVOLOG PEN) 100 UNIT/ML injection INJECT 8UNITS BEFORE BREAKFAST AND LUNCH AND 8  UNITS WITH SUPPER     insulin glargine (LANTUS SOLOSTAR) 100 UNIT/ML pen Inject 15 Units Subcutaneous     insulin pen needle (B-D U/F) 31G X 8 MM USE TO INJECT WITH INSULIN PEN DEVICE     lisinopril (ZESTRIL) 40 MG tablet Take 40 mg by mouth daily     METOPROLOL SUCCINATE ER PO Take 100 mg by mouth daily XL     SODIUM BICARBONATE PO Take 0.5 teaspoonful by mouth daily     vitamin D2 (ERGOCALCIFEROL) 46286 units (1250 mcg) capsule Take 50,000 Units by mouth once a week     vitamin D3 (CHOLECALCIFEROL) 50 mcg (2000 units) tablet Take 1 tablet by mouth daily     No current facility-administered medications for this visit.      Medications Discontinued During This Encounter   Medication Reason     azaTHIOprine (IMURAN) 50 MG tablet        Physical Exam   Vital Signs: BP (!) 170/78   Pulse 65   Temp 98.6  F (37  C)   Wt 86.3 kg (190 lb 4.8 oz)   SpO2 98%   BMI 31.23 kg/m      GENERAL APPEARANCE: alert and no distress  HENT: mouth without ulcers or lesions  LYMPHATICS:  no cervical or supraclavicular nodes  RESP: lungs clear to auscultation - no rales, rhonchi or wheezes  CV: regular rhythm, normal rate, no rub, no murmur  EDEMA: no LE edema bilaterally  ABDOMEN: soft, nondistended, nontender, bowel sounds normal  MS: extremities normal - no gross deformities noted, no evidence of inflammation in joints, no muscle tenderness  SKIN: no rash  TX KIDNEY: normal  DIALYSIS ACCESS:  LUE AV fistula no palpable thrill     Data     Renal Latest Ref Rng & Units 3/16/2021 2/26/2020 12/12/2019   Na 133 - 144 mmol/L 139 - -   Na (external) 135 - 145 mmol/L - 132(L) 138   K 3.4 - 5.3 mmol/L 4.9 - -   K (external) 3.5 - 5.0 mmol/L - 5.3(H) 4.9   Cl 94 - 109 mmol/L 108 - -   Cl (external) 98 - 110 mmol/L - 107 106   CO2 20 - 32 mmol/L 28 - -   CO2 (external) 21 - 31 mmol/L - 20(L) 26   BUN 7 - 30 mg/dL 22 - -   BUN (external) 8 - 25 mg/dL - 29(H) 31(H)   Cr 0.66 - 1.25 mg/dL 1.38(H) - -   Cr (external) 0.72 - 1.25 mg/dL - 1.62(H) 1.73(H)   Glucose 70 - 99 mg/dL 107(H) - -   Glucose (external) 65 - 100 mg/dL - 135(H) 147(H)   Ca  8.5 - 10.1 mg/dL 9.2 - -   Ca (external) 8.5 - 10.5 mg/dL - 9.2 9.4        Heme Latest Ref Rng & Units 3/16/2021 2/26/2020 12/12/2019   WBC 4.0 - 11.0 10e9/L 4.9 - -   WBC (external) 4.5 - 11.0 thou/cu mm - 5.2 5.2   Hgb 13.3 - 17.7 g/dL 11.7(L) - -   Hgb (external) 13.5 - 17.5 g/dL - 12.0(L) 11.5(L)   Plt 150 - 450 10e9/L 230 - -   Plt (external) 140 - 440 thou/cu mm - 218 243   ABSOLUTE NEUTROPHIL 1.6 - 8.3 10e9/L - - -   ABSOLUTE LYMPHOCYTES 0.8 - 5.3 10e9/L - - -   ABSOLUTE MONOCYTES 0.0 - 1.3 10e9/L - - -   ABSOLUTE EOSINOPHILS 0.0 - 0.7 10e9/L - - -   ABSOLUTE BASOPHILS 0.0 - 0.2 10e9/L - - -   ABS IMMATURE GRANULOCYTES 0 - 0.4 10e9/L - - -   ABSOLUTE NUCLEATED RBC - - - -        Pancreas Latest Ref Rng & Units 2/26/2020 12/12/2019 5/9/2019   A1C (external) <=6.4 % - - -   Amylase 30 - 110 U/L - - -   Amylase (external) 25 - 125 IU/L 85 79 90   Lipase 73 - 393 U/L -  - -   Lipase (external) 25 - 125 IU/L 85 79 90     Iron studies Latest Ref Rng & Units 5/9/2018   Iron (external) 8.0 - 78.0 IU/L 16.1     [facility] Txp Virology Latest Ref Rng & Units 9/19/2018 5/5/2017 10/7/2016   BK Quant Result Ext None detected None detected None Detected -   BK Quant Rapid Ext None detected - - None detected        Recent Labs   Lab Test 05/01/14  0750 10/28/15  0759 10/07/16  0800   DOSTAC 04/30/2014 2030 10/27/15  2015 1945 10/6/16   TACROL <3.0  Tacrolimus Reference Range   Kidney Transplant   Pediatric                      ug/L     0-3 months post transplant   10-12     3-6 months post transplant   8-10     6-12 months post transplant  6-8     >12 months post transplant   4-7   Adult     0-6 months post transplant   8-10     6-12 months post transplant  6-8     >12 months post transplant   4-6     >5 years post transplant     3-5   Heart Transplant   Pediatric     0-12 months post transplant  10-15     >12 months post transplant   5-10   Adult     0-3 months post transplant   10-15     3-6 months post transplant   8-12     6-12 months post transplant  6-12     >12 months post transplant   6-10   Lung Transplant     0-12 months post transplant  10-15     >12 months post transplant   8-12   Liver Transplant   Pediatric     0-3 months post transplant   10-15     3-6 months post transplant   8-10     >6 months post transplant    6-8   Adult     0-3 months post transplant   10-12     3-6 months post transplant   8-10     >6  months post transplant    6-8   Pancreas Transplant     0-6 months post transplant   8-10     >6 months post transplant    5-8  * <3.0  Tacrolimus Reference Range   Kidney Transplant   Pediatric                      ug/L     0-3 months post transplant   10-12     3-6 months post transplant   8-10     6-12 months post transplant  6-8     >12 months post transplant   4-7   Adult     0-6 months post transplant   8-10     6-12 months post transplant  6-8     >12 months post  transplant   4-6     >5 years post transplant     3-5   Heart Transplant   Pediatric     0-12 months post transplant  10-15     >12 months post transplant   5-10   Adult     0-3 months post transplant   10-15     3-6 months post transplant   8-12     6-12 months post transplant  6-12     >12 months post transplant   6-10   Lung Transplant     0-12 months post transplant  10-15     >12 months post transplant   8-12   Liver Transplant   Pediatric     0-3 months post transplant   10-15     3-6 months post transplant   8-10     >6 months post transplant    6-8   Adult     0-3 months post transplant   10-12     3-6 months post transplant   8-10     >6  months post transplant    6-8   Pancreas Transplant     0-6 months post transplant   8-10     >6 months post transplant    5-8   This test was developed and its performance characteristics determined by the   [facility],  Special Chemistry Laboratory. It has   not been cleared or approved by the FDA. The laboratory is regulated under CLIA   as qualified to perform high-complexity testing. This test is used for clinical   purposes. It should not be regarded as investigational or for research.  * <3.0  Tacrolimus Reference Range   Kidney Transplant   Pediatric                      ug/L     0-3 months post transplant   10-12     3-6 months post transplant   8-10     6-12 months post transplant  6-8     >12 months post transplant   4-7   Adult     0-6 months post transplant   8-10     6-12 months post transplant  6-8     >12 months post transplant   4-6     >5 years post transplant     3-5   Heart Transplant   Pediatric     0-12 months post transplant  10-15     >12 months post transplant   5-10   Adult     0-3 months post transplant   10-15     3-6 months post transplant   8-12     6-12 months post transplant  6-12     >12 months post transplant   6-10   Lung Transplant     0-12 months post transplant  10-15     >12 months post transplant   8-12    Liver Transplant   Pediatric     0-3 months post transplant   10-15     3-6 months post transplant   8-10     >6 months post transplant    6-8   Adult     0-3 months post transplant   10-12     3-6 months post transplant   8-10     >6  months post transplant    6-8   Pancreas Transplant     0-6 months post transplant   8-10     >6 months post transplant    5-8   This test was developed and its performance characteristics determined by the   [facility],  Special Chemistry Laboratory. It has   not been cleared or approved by the FDA. The laboratory is regulated under CLIA   as qualified to perform high-complexity testing. This test is used for clinical   purposes. It should not be regarded as investigational or for research.  *          Turner Verma MD on 3/18/2021 at 4:24 PM        Mateo Love MD

## 2021-03-18 NOTE — NURSING NOTE
"Chief Complaint   Patient presents with     RECHECK     Follow up TX     Vital signs:  Temp: 98.6  F (37  C)   BP: (!) 170/78 Pulse: 65     SpO2: 98 %       Weight: 86.3 kg (190 lb 4.8 oz)  Estimated body mass index is 31.23 kg/m  as calculated from the following:    Height as of 5/15/18: 1.662 m (5' 5.45\").    Weight as of this encounter: 86.3 kg (190 lb 4.8 oz).      Lorelei Valentino, CMA    "

## 2021-03-18 NOTE — LETTER
3/18/2021       RE: Mathew Vaughan  [address]  [place] [address]     Dear Colleague,    Thank you for referring your patient, Mathew Vaughan, to the [facility] NEPHROLOGY [facility] [place] at [facility]. Please see a copy of my visit note below.    CHRONIC TRANSPLANT NEPHROLOGY VISIT    Assessment & Plan   # DDKT (SPK): Stable   - Baseline Creatinine:  ~ 1.2-1.3   - Proteinuria: Minimal (0.2-0.5 grams)   - Date DSA Last Checked: Not Known      Latest DSA: Not checked recently due to time from transplant   - BK Viremia: Not checked recently due to time from transplant   - Kidney Tx Biopsy: No    # Immunosuppression: Cyclosporine (goal ) and Azathioprine (dose 150 mg mg daily)          - Continue with intensive monitoring of immunosuppression for efficacy and toxicity.          - Changes: No    # Infection Prophylaxis:   - PJP: None    # Hypertension: Inadequate control;  Goal BP: < 130/80   - Changes: Yes -   Start Amlodipine 5 mg every evening and Chlorthalidone 12.5 mg (half a tab) in the morning. Continue taking lisinopril 40 mg daily and Toprol  mg daily.  Advised daily BP measurements. Proper measurement technique discussed with the pt. Will call him next week for follow up on BP response.     # Diabetes, s/p Failed Pancreas Tx (SPK): Borderline control (HbA1c 7-9%) Last HbA1c: 7.2%   - Management as per primary care.    # Anemia in Chronic Renal Disease: Hgb: Stable, near normal      MONICA: No   - Iron studies: Not checked recently    # Mineral Bone Disorder:   - Secondary renal hyperparathyroidism; PTH level: Not checked recently        On treatment: None  - Vitamin D; level: Low        On supplement: Yes on vitamin D2 50,000 international unit(s) weekly and vitamin D3 2,000 daily.   - Calcium; level: Normal        On supplement: No    # Electrolytes:   - Potassium; level: Normal        On supplement: No  - Bicarbonate; level:  High normal        On supplement: Yes, advised the pt to reduce his bicarb (baking soda) to 50 %.     # Skin Cancer: New lesions: one   - Discussed sun protection and recommend regular follow up with Dermatology.    # Medical Compliance: Yes    # Transplant History:  Etiology of Kidney Failure: Diabetes mellitus type 1  Tx: ANANTK  Transplant: 7/9/1989 (Kidney / Pancreas)  Significant changes in immunosuppression: None  Significant transplant-related complications: None    Transplant Office Phone Number: [phone]    Assessment and plan was discussed with the patient and he voiced his understanding and agreement.    Return visit: Return in about 1 year (around 3/18/2022).    Turner Verma MD     Attestation:  This patient has been seen and evaluated by me, Mateo Love MD.  I have reviewed the note and agree with plan of care as documented by the fellow.       Chief Complaint   Mr. Vaughan is a 65 year old here for kidney transplant and immunosuppression management.    Interval:   Pt is doing well overall. He has gained weight , about 30 lb over last few years, he attributes to sedentary lifestyle, insulin requirements also increased over the years.. His BP has been out of control, home BP measurements have been 150-160/. He takes lisinopril 40 mg and Toprol  mg daily. He does have some leg swelling. He denies an SOB or orthopnea. He denies any N/V/D/F/C or dizziness. He sees dermatology regularly but hasn't seen his dermatologist in 14 months, he states because of the pandemic but he will schedule an appointment soon. He states he gets a new diagnosis of skin cancer about once per year.     Home BP: 150-160/. Today's BP in the clinic is 170/78    Problem List   Patient Active Problem List   Diagnosis     Diabetes mellitus type 1 (H)     Kidney replaced by transplant     Pancreas replaced by transplant (H)     Skin cancer     HTN, kidney transplant related     Aftercare following organ  transplant     Chronic kidney disease, stage 3     Immunosuppression (H)     Vitamin D deficiency     Anemia in chronic renal disease       Allergies   No Known Allergies    Medications   Current Outpatient Medications   Medication Sig     amLODIPine (NORVASC) 5 MG tablet Take 1 tablet (5 mg) by mouth daily     azaTHIOprine (IMURAN) 50 MG tablet Take 3 tablets (150 mg) by mouth daily     blood glucose monitoring (NO BRAND SPECIFIED) test strip TEST STRIPS COVERED BY PT INS. 250.00 IDDM TYPE II - TEST 3 TIMES/DAY.     Blood Glucose Monitoring Suppl (GLUCOCOM BLOOD GLUCOSE MONITOR) RAIN Dispense glucose meter, test strips and lancets covered by the patient insurance. Test 3 times per day.     chlorthalidone (HYGROTON) 25 MG tablet Take 0.5 tablets (12.5 mg) by mouth daily     cycloSPORINE modified (GENERIC EQUIVALENT) 100 MG capsule TAKE 1 CAPSULE BY MOUTH TWICE DAILY. (TOTAL DAILY DOSE 125 MG TWICE DAILY.)     cycloSPORINE modified (GENERIC EQUIVALENT) 25 MG capsule TAKE 1 CAPSULE BY MOUTH TWICE DAILY. (TOTAL DAILY DOSE 125MG TWICE DAILY.)     fluticasone (FLONASE) 50 MCG/ACT spray      insulin aspart (NOVOLOG PEN) 100 UNIT/ML injection INJECT 8UNITS BEFORE BREAKFAST AND LUNCH AND 8  UNITS WITH SUPPER     insulin glargine (LANTUS SOLOSTAR) 100 UNIT/ML pen Inject 15 Units Subcutaneous     insulin pen needle (B-D U/F) 31G X 8 MM USE TO INJECT WITH INSULIN PEN DEVICE     lisinopril (ZESTRIL) 40 MG tablet Take 40 mg by mouth daily     METOPROLOL SUCCINATE ER PO Take 100 mg by mouth daily XL     SODIUM BICARBONATE PO Take 0.5 teaspoonful by mouth daily     vitamin D2 (ERGOCALCIFEROL) 69629 units (1250 mcg) capsule Take 50,000 Units by mouth once a week     vitamin D3 (CHOLECALCIFEROL) 50 mcg (2000 units) tablet Take 1 tablet by mouth daily     No current facility-administered medications for this visit.      Medications Discontinued During This Encounter   Medication Reason     azaTHIOprine (IMURAN) 50 MG tablet         Physical Exam   Vital Signs: BP (!) 170/78   Pulse 65   Temp 98.6  F (37  C)   Wt 86.3 kg (190 lb 4.8 oz)   SpO2 98%   BMI 31.23 kg/m      GENERAL APPEARANCE: alert and no distress  HENT: mouth without ulcers or lesions  LYMPHATICS: no cervical or supraclavicular nodes  RESP: lungs clear to auscultation - no rales, rhonchi or wheezes  CV: regular rhythm, normal rate, no rub, no murmur  EDEMA: no LE edema bilaterally  ABDOMEN: soft, nondistended, nontender, bowel sounds normal  MS: extremities normal - no gross deformities noted, no evidence of inflammation in joints, no muscle tenderness  SKIN: no rash  TX KIDNEY: normal  DIALYSIS ACCESS:  LUE AV fistula no palpable thrill     Data     Renal Latest Ref Rng & Units 3/16/2021 2/26/2020 12/12/2019   Na 133 - 144 mmol/L 139 - -   Na (external) 135 - 145 mmol/L - 132(L) 138   K 3.4 - 5.3 mmol/L 4.9 - -   K (external) 3.5 - 5.0 mmol/L - 5.3(H) 4.9   Cl 94 - 109 mmol/L 108 - -   Cl (external) 98 - 110 mmol/L - 107 106   CO2 20 - 32 mmol/L 28 - -   CO2 (external) 21 - 31 mmol/L - 20(L) 26   BUN 7 - 30 mg/dL 22 - -   BUN (external) 8 - 25 mg/dL - 29(H) 31(H)   Cr 0.66 - 1.25 mg/dL 1.38(H) - -   Cr (external) 0.72 - 1.25 mg/dL - 1.62(H) 1.73(H)   Glucose 70 - 99 mg/dL 107(H) - -   Glucose (external) 65 - 100 mg/dL - 135(H) 147(H)   Ca  8.5 - 10.1 mg/dL 9.2 - -   Ca (external) 8.5 - 10.5 mg/dL - 9.2 9.4        Heme Latest Ref Rng & Units 3/16/2021 2/26/2020 12/12/2019   WBC 4.0 - 11.0 10e9/L 4.9 - -   WBC (external) 4.5 - 11.0 thou/cu mm - 5.2 5.2   Hgb 13.3 - 17.7 g/dL 11.7(L) - -   Hgb (external) 13.5 - 17.5 g/dL - 12.0(L) 11.5(L)   Plt 150 - 450 10e9/L 230 - -   Plt (external) 140 - 440 thou/cu mm - 218 243   ABSOLUTE NEUTROPHIL 1.6 - 8.3 10e9/L - - -   ABSOLUTE LYMPHOCYTES 0.8 - 5.3 10e9/L - - -   ABSOLUTE MONOCYTES 0.0 - 1.3 10e9/L - - -   ABSOLUTE EOSINOPHILS 0.0 - 0.7 10e9/L - - -   ABSOLUTE BASOPHILS 0.0 - 0.2 10e9/L - - -   ABS IMMATURE GRANULOCYTES 0 - 0.4  10e9/L - - -   ABSOLUTE NUCLEATED RBC - - - -        Pancreas Latest Ref Rng & Units 2/26/2020 12/12/2019 5/9/2019   A1C (external) <=6.4 % - - -   Amylase 30 - 110 U/L - - -   Amylase (external) 25 - 125 IU/L 85 79 90   Lipase 73 - 393 U/L - - -   Lipase (external) 25 - 125 IU/L 85 79 90     Iron studies Latest Ref Rng & Units 5/9/2018   Iron (external) 8.0 - 78.0 IU/L 16.1     [facility] Txp Virology Latest Ref Rng & Units 9/19/2018 5/5/2017 10/7/2016   BK Quant Result Ext None detected None detected None Detected -   BK Quant Rapid Ext None detected - - None detected        Recent Labs   Lab Test 05/01/14  0750 10/28/15  0759 10/07/16  0800   DOSTAC 04/30/2014 2030 10/27/15  2015 1945 10/6/16   TACROL <3.0  Tacrolimus Reference Range   Kidney Transplant   Pediatric                      ug/L     0-3 months post transplant   10-12     3-6 months post transplant   8-10     6-12 months post transplant  6-8     >12 months post transplant   4-7   Adult     0-6 months post transplant   8-10     6-12 months post transplant  6-8     >12 months post transplant   4-6     >5 years post transplant     3-5   Heart Transplant   Pediatric     0-12 months post transplant  10-15     >12 months post transplant   5-10   Adult     0-3 months post transplant   10-15     3-6 months post transplant   8-12     6-12 months post transplant  6-12     >12 months post transplant   6-10   Lung Transplant     0-12 months post transplant  10-15     >12 months post transplant   8-12   Liver Transplant   Pediatric     0-3 months post transplant   10-15     3-6 months post transplant   8-10     >6 months post transplant    6-8   Adult     0-3 months post transplant   10-12     3-6 months post transplant   8-10     >6  months post transplant    6-8   Pancreas Transplant     0-6 months post transplant   8-10     >6 months post transplant    5-8  * <3.0  Tacrolimus Reference Range   Kidney Transplant   Pediatric                      ug/L     0-3 months post  transplant   10-12     3-6 months post transplant   8-10     6-12 months post transplant  6-8     >12 months post transplant   4-7   Adult     0-6 months post transplant   8-10     6-12 months post transplant  6-8     >12 months post transplant   4-6     >5 years post transplant     3-5   Heart Transplant   Pediatric     0-12 months post transplant  10-15     >12 months post transplant   5-10   Adult     0-3 months post transplant   10-15     3-6 months post transplant   8-12     6-12 months post transplant  6-12     >12 months post transplant   6-10   Lung Transplant     0-12 months post transplant  10-15     >12 months post transplant   8-12   Liver Transplant   Pediatric     0-3 months post transplant   10-15     3-6 months post transplant   8-10     >6 months post transplant    6-8   Adult     0-3 months post transplant   10-12     3-6 months post transplant   8-10     >6  months post transplant    6-8   Pancreas Transplant     0-6 months post transplant   8-10     >6 months post transplant    5-8   This test was developed and its performance characteristics determined by the   [facility],  Special Chemistry Laboratory. It has   not been cleared or approved by the FDA. The laboratory is regulated under CLIA   as qualified to perform high-complexity testing. This test is used for clinical   purposes. It should not be regarded as investigational or for research.  * <3.0  Tacrolimus Reference Range   Kidney Transplant   Pediatric                      ug/L     0-3 months post transplant   10-12     3-6 months post transplant   8-10     6-12 months post transplant  6-8     >12 months post transplant   4-7   Adult     0-6 months post transplant   8-10     6-12 months post transplant  6-8     >12 months post transplant   4-6     >5 years post transplant     3-5   Heart Transplant   Pediatric     0-12 months post transplant  10-15     >12 months post transplant   5-10   Adult     0-3 months  post transplant   10-15     3-6 months post transplant   8-12     6-12 months post transplant  6-12     >12 months post transplant   6-10   Lung Transplant     0-12 months post transplant  10-15     >12 months post transplant   8-12   Liver Transplant   Pediatric     0-3 months post transplant   10-15     3-6 months post transplant   8-10     >6 months post transplant    6-8   Adult     0-3 months post transplant   10-12     3-6 months post transplant   8-10     >6  months post transplant    6-8   Pancreas Transplant     0-6 months post transplant   8-10     >6 months post transplant    5-8   This test was developed and its performance characteristics determined by the   [facility],  Special Chemistry Laboratory. It has   not been cleared or approved by the FDA. The laboratory is regulated under CLIA   as qualified to perform high-complexity testing. This test is used for clinical   purposes. It should not be regarded as investigational or for research.  *          Turner Verma MD on 3/18/2021 at 4:24 PM        Again, thank you for allowing me to participate in the care of your patient.      Sincerely,    Mateo Love MD

## 2021-03-19 ENCOUNTER — TELEPHONE (OUTPATIENT)
Dept: TRANSPLANT | Facility: CLINIC | Age: 66
End: 2021-03-19

## 2021-03-19 NOTE — TELEPHONE ENCOUNTER
Karen Ruffin RN     Turner Verma MD [place], Teresa, RN    Cc: Mateo Love MD             Hi,     We saw Mr. Vaughan today. His blood pressure has been out of control. We started amlodipine 5 mg daily and chlorthalidone 12.5 mg daily to be added to his current BP meds (lisinopril 40 mg daily and Toprol  mg daily). Please give him a call in a week or 2 to see how his BP does. He doesn't have any history of A.fib, consider going up on amlodipine or switching metoprolol to coreg if BP is not controlled.     Thanks,     Turner.      [facility] will call in 1-2 weeks.

## 2021-03-21 PROBLEM — D84.9 IMMUNOSUPPRESSION (H): Status: ACTIVE | Noted: 2021-03-21

## 2021-03-21 PROBLEM — N18.9 ANEMIA IN CHRONIC RENAL DISEASE: Status: ACTIVE | Noted: 2021-03-21

## 2021-03-21 PROBLEM — D63.1 ANEMIA IN CHRONIC RENAL DISEASE: Status: ACTIVE | Noted: 2021-03-21

## 2021-03-21 PROBLEM — Z48.298 AFTERCARE FOLLOWING ORGAN TRANSPLANT: Status: ACTIVE | Noted: 2021-03-21

## 2021-03-21 PROBLEM — E55.9 VITAMIN D DEFICIENCY: Status: ACTIVE | Noted: 2021-03-21

## 2021-03-21 PROBLEM — N18.30 CHRONIC KIDNEY DISEASE, STAGE 3 (H): Status: ACTIVE | Noted: 2021-03-21

## 2021-03-28 ENCOUNTER — HEALTH MAINTENANCE LETTER (OUTPATIENT)
Age: 66
End: 2021-03-28

## 2021-04-05 ENCOUNTER — TELEPHONE (OUTPATIENT)
Dept: TRANSPLANT | Facility: CLINIC | Age: 66
End: 2021-04-05

## 2021-04-05 NOTE — TELEPHONE ENCOUNTER
Please give him a call in a week or 2 to see how his BP does. He doesn't have any history of A.fib, consider going up on amlodipine or switching metoprolol to coreg if BP is not controlled.     Due for call.    Called Mathew Vaughan and left a message to call Nephrology with his BP readings.

## 2021-04-08 DIAGNOSIS — Z94.83 PANCREAS REPLACED BY TRANSPLANT (H): ICD-10-CM

## 2021-04-08 DIAGNOSIS — Z94.0 KIDNEY REPLACED BY TRANSPLANT: Primary | ICD-10-CM

## 2021-04-08 RX ORDER — CYCLOSPORINE 25 MG/1
25 CAPSULE, LIQUID FILLED ORAL 2 TIMES DAILY
Qty: 60 CAPSULE | Refills: 11 | Status: SHIPPED | OUTPATIENT
Start: 2021-04-08 | End: 2022-03-31

## 2021-04-08 RX ORDER — CYCLOSPORINE 100 MG/1
100 CAPSULE, LIQUID FILLED ORAL 2 TIMES DAILY
Qty: 60 CAPSULE | Refills: 11 | Status: SHIPPED | OUTPATIENT
Start: 2021-04-08 | End: 2022-03-31

## 2021-05-23 ENCOUNTER — HEALTH MAINTENANCE LETTER (OUTPATIENT)
Age: 66
End: 2021-05-23

## 2021-06-10 DIAGNOSIS — Z94.0 KIDNEY REPLACED BY TRANSPLANT: ICD-10-CM

## 2021-06-10 DIAGNOSIS — Z94.83 PANCREAS REPLACED BY TRANSPLANT (H): ICD-10-CM

## 2021-06-10 RX ORDER — AZATHIOPRINE 50 MG/1
150 TABLET ORAL DAILY
Qty: 315 TABLET | Refills: 2 | Status: SHIPPED | OUTPATIENT
Start: 2021-06-10 | End: 2022-03-23

## 2021-06-10 NOTE — TELEPHONE ENCOUNTER
Provider Call: Medication Refill  Route to LPN  Pharmacy Name: [facility]  Pharmacy Location: [place]  Name of Medication: AZA Dose: pt stated dose has changed  Need new RX with change   When will the patient be out of this medication?: Less than 3 days (Route high priority)  Callback needed? Yes They need t send out today     Return Call Needed  Same as documented in contacts section  When to return call?: Same day: Route High Priority

## 2021-09-12 ENCOUNTER — HEALTH MAINTENANCE LETTER (OUTPATIENT)
Age: 66
End: 2021-09-12

## 2021-10-28 DIAGNOSIS — Z94.0 KIDNEY TRANSPLANTED: Primary | ICD-10-CM

## 2021-10-28 DIAGNOSIS — Z94.0 IMMUNOSUPPRESSIVE MANAGEMENT ENCOUNTER FOLLOWING KIDNEY TRANSPLANT: ICD-10-CM

## 2021-10-28 DIAGNOSIS — Z79.899 IMMUNOSUPPRESSIVE MANAGEMENT ENCOUNTER FOLLOWING KIDNEY TRANSPLANT: ICD-10-CM

## 2021-10-28 DIAGNOSIS — Z48.298 AFTERCARE FOLLOWING ORGAN TRANSPLANT: ICD-10-CM

## 2021-11-07 ENCOUNTER — LAB (OUTPATIENT)
Dept: LAB | Facility: CLINIC | Age: 66
End: 2021-11-07
Payer: COMMERCIAL

## 2021-11-07 DIAGNOSIS — Z94.0 IMMUNOSUPPRESSIVE MANAGEMENT ENCOUNTER FOLLOWING KIDNEY TRANSPLANT: ICD-10-CM

## 2021-11-07 DIAGNOSIS — Z94.0 KIDNEY TRANSPLANTED: ICD-10-CM

## 2021-11-07 DIAGNOSIS — Z79.899 IMMUNOSUPPRESSIVE MANAGEMENT ENCOUNTER FOLLOWING KIDNEY TRANSPLANT: ICD-10-CM

## 2021-11-07 DIAGNOSIS — Z48.298 AFTERCARE FOLLOWING ORGAN TRANSPLANT: ICD-10-CM

## 2021-11-07 LAB
CREAT UR-MCNC: 71 MG/DL
ERYTHROCYTE [DISTWIDTH] IN BLOOD BY AUTOMATED COUNT: 13.4 % (ref 10–15)
HCT VFR BLD AUTO: 36.7 % (ref 35–53)
HGB BLD-MCNC: 12.4 G/DL (ref 11.7–17.7)
MCH RBC QN AUTO: 36.2 PG (ref 26.5–33)
MCHC RBC AUTO-ENTMCNC: 33.8 G/DL (ref 31.5–36.5)
MCV RBC AUTO: 107 FL (ref 78–100)
PLATELET # BLD AUTO: 254 10E3/UL (ref 150–450)
PROT UR-MCNC: 0.4 G/L
PROT/CREAT 24H UR: 0.56 G/G CR (ref 0–0.2)
RBC # BLD AUTO: 3.43 10E6/UL (ref 3.8–5.9)
WBC # BLD AUTO: 6.2 10E3/UL (ref 4–11)

## 2021-11-07 PROCEDURE — 85027 COMPLETE CBC AUTOMATED: CPT

## 2021-11-07 PROCEDURE — 80048 BASIC METABOLIC PNL TOTAL CA: CPT

## 2021-11-07 PROCEDURE — 36415 COLL VENOUS BLD VENIPUNCTURE: CPT

## 2021-11-07 PROCEDURE — 80158 DRUG ASSAY CYCLOSPORINE: CPT

## 2021-11-07 PROCEDURE — 84156 ASSAY OF PROTEIN URINE: CPT

## 2021-11-08 ENCOUNTER — TELEPHONE (OUTPATIENT)
Dept: TRANSPLANT | Facility: CLINIC | Age: 66
End: 2021-11-08
Payer: COMMERCIAL

## 2021-11-08 DIAGNOSIS — Z94.0 KIDNEY TRANSPLANTED: Primary | ICD-10-CM

## 2021-11-08 LAB
ANION GAP SERPL CALCULATED.3IONS-SCNC: 5 MMOL/L (ref 3–14)
BUN SERPL-MCNC: 35 MG/DL (ref 7–30)
CALCIUM SERPL-MCNC: 9.1 MG/DL (ref 8.5–10.1)
CHLORIDE BLD-SCNC: 108 MMOL/L (ref 94–109)
CO2 SERPL-SCNC: 22 MMOL/L (ref 20–32)
CREAT SERPL-MCNC: 1.44 MG/DL (ref 0.52–1.25)
CYCLOSPORINE BLD LC/MS/MS-MCNC: 132 UG/L (ref 50–400)
GFR SERPL CREATININE-BSD FRML MDRD: 50 ML/MIN/1.73M2
GLUCOSE BLD-MCNC: 110 MG/DL (ref 70–99)
POTASSIUM BLD-SCNC: 4.8 MMOL/L (ref 3.4–5.3)
SODIUM SERPL-SCNC: 135 MMOL/L (ref 133–144)
TME LAST DOSE: NORMAL H
TME LAST DOSE: NORMAL H

## 2021-11-08 NOTE — TELEPHONE ENCOUNTER
Spoke to patient regarding:  Creatinine 1.44, baseline 1.2-1.3.  UPC at baseline   Cyclosporine not yet resulted      Patient states that his hydration status was not average on Saturday as he was working outside in the yard.    Patient states that he had an ear infection 1-2 weeks ago.  Patient denies any current illness, diarrhea, s/s of a UTI, or medication changes.  Patient denies any increased intake of alcohol or caffeine.  Recommended increasing hydration and repeating labs within 1 week.

## 2021-11-08 NOTE — TELEPHONE ENCOUNTER
ISSUE  Creatinine 1.44, baseline 1.2-1.3.  UPC at baseline   Cyclosporine not yet resulted     PLAN  Call and assess hydration status.  How much water is he drinking per day?  Any recent illness, diarrhea, s/s of a UTI, or medication changes?  Any increased intake of alcohol or caffeine?  Recommend increasing hydration and repeating labs within 1 week.      LPN task:  Please call with above plan and send lab orders.  Thanks!

## 2021-11-09 ENCOUNTER — TELEPHONE (OUTPATIENT)
Dept: TRANSPLANT | Facility: CLINIC | Age: 66
End: 2021-11-09
Payer: COMMERCIAL

## 2021-11-09 DIAGNOSIS — Z48.298 AFTERCARE FOLLOWING ORGAN TRANSPLANT: Primary | ICD-10-CM

## 2021-11-09 NOTE — TELEPHONE ENCOUNTER
Cyclosporine = 132 (11/7/21)  Goal   Current CSA dose 100 mg BID    Previous level at goal on same dose.    PLAN:   Call and confirm this was a good 12-hour trough.   Verify dose 100 mg BID.   Confirm no new medications or illness (benjy. Diarrhea).  If good trough, stay on the same dose.   Recheck level in 2 weeks and make sure it is a good trough to avoid additional lab draws.

## 2021-11-10 NOTE — TELEPHONE ENCOUNTER
Call placed to patient. Patient confirms current dose at 125/100 and accurate trough level. Denies any recent illness, diarrhea or medication changes. Patient v\u to continue current dose and repeat level in 2 weeks. Order sent

## 2022-01-02 ENCOUNTER — HEALTH MAINTENANCE LETTER (OUTPATIENT)
Age: 67
End: 2022-01-02

## 2022-03-22 DIAGNOSIS — Z94.83 PANCREAS REPLACED BY TRANSPLANT (H): ICD-10-CM

## 2022-03-22 DIAGNOSIS — Z94.0 KIDNEY REPLACED BY TRANSPLANT: ICD-10-CM

## 2022-03-23 ENCOUNTER — TELEPHONE (OUTPATIENT)
Dept: TRANSPLANT | Facility: CLINIC | Age: 67
End: 2022-03-23
Payer: COMMERCIAL

## 2022-03-23 DIAGNOSIS — Z94.83 PANCREAS REPLACED BY TRANSPLANT (H): ICD-10-CM

## 2022-03-23 DIAGNOSIS — Z94.0 KIDNEY REPLACED BY TRANSPLANT: Primary | ICD-10-CM

## 2022-03-23 RX ORDER — AZATHIOPRINE 50 MG/1
TABLET ORAL
Qty: 270 TABLET | Refills: 1 | Status: SHIPPED | OUTPATIENT
Start: 2022-03-23 | End: 2023-02-27

## 2022-03-23 NOTE — TELEPHONE ENCOUNTER
ISSUE: Refill request for Imuran.  Last lab on file November of 2021, last transplant nephrology apt 3/18/202.    PLAN:  Review following with patient:  For the best outcome for your transplant and in order for us to refill your prescriptions, we encourage you to have a yearly clinic appointment with a physician and to have current labs. If you are unable to return to our transplant center there are several alternatives.    OUTCOME:  Left detailed message for patient to please obtain transplant labs ASAP.  Asked for CB at [facility] clinic.    Order placed for [facility] follow up, lab orders updated.  No preferred lab on file- labs updated in [facility] system.    Jerica Ramirez RN   Transplant Coordinator  [phone]

## 2022-03-31 DIAGNOSIS — Z94.0 KIDNEY REPLACED BY TRANSPLANT: ICD-10-CM

## 2022-03-31 DIAGNOSIS — Z94.83 PANCREAS REPLACED BY TRANSPLANT (H): ICD-10-CM

## 2022-03-31 RX ORDER — CYCLOSPORINE 25 MG/1
CAPSULE, LIQUID FILLED ORAL
Qty: 60 CAPSULE | Refills: 9 | Status: SHIPPED | OUTPATIENT
Start: 2022-03-31 | End: 2023-03-13

## 2022-03-31 RX ORDER — CYCLOSPORINE 100 MG/1
CAPSULE, LIQUID FILLED ORAL
Qty: 60 CAPSULE | Refills: 8 | Status: SHIPPED | OUTPATIENT
Start: 2022-03-31 | End: 2023-03-13

## 2022-04-14 DIAGNOSIS — I15.1 HYPERTENSION DUE TO KIDNEY TRANSPLANT: ICD-10-CM

## 2022-04-14 DIAGNOSIS — Z94.0 HYPERTENSION DUE TO KIDNEY TRANSPLANT: ICD-10-CM

## 2022-04-15 ENCOUNTER — LAB (OUTPATIENT)
Dept: LAB | Facility: CLINIC | Age: 67
End: 2022-04-15
Payer: COMMERCIAL

## 2022-04-15 ENCOUNTER — TELEPHONE (OUTPATIENT)
Dept: TRANSPLANT | Facility: CLINIC | Age: 67
End: 2022-04-15

## 2022-04-15 DIAGNOSIS — Z94.83 PANCREAS REPLACED BY TRANSPLANT (H): ICD-10-CM

## 2022-04-15 DIAGNOSIS — Z94.0 KIDNEY REPLACED BY TRANSPLANT: ICD-10-CM

## 2022-04-15 DIAGNOSIS — Z48.298 AFTERCARE FOLLOWING ORGAN TRANSPLANT: Primary | ICD-10-CM

## 2022-04-15 LAB
ANION GAP SERPL CALCULATED.3IONS-SCNC: 5 MMOL/L (ref 3–14)
BUN SERPL-MCNC: 44 MG/DL (ref 7–30)
CALCIUM SERPL-MCNC: 9.3 MG/DL (ref 8.5–10.1)
CHLORIDE BLD-SCNC: 108 MMOL/L (ref 94–109)
CO2 SERPL-SCNC: 24 MMOL/L (ref 20–32)
CREAT SERPL-MCNC: 1.67 MG/DL (ref 0.52–1.25)
ERYTHROCYTE [DISTWIDTH] IN BLOOD BY AUTOMATED COUNT: 13.2 % (ref 10–15)
GFR SERPL CREATININE-BSD FRML MDRD: 44 ML/MIN/1.73M2
GLUCOSE BLD-MCNC: 142 MG/DL (ref 70–99)
HCT VFR BLD AUTO: 34.5 % (ref 35–53)
HGB BLD-MCNC: 11.6 G/DL (ref 11.7–17.7)
MCH RBC QN AUTO: 36.7 PG (ref 26.5–33)
MCHC RBC AUTO-ENTMCNC: 33.6 G/DL (ref 31.5–36.5)
MCV RBC AUTO: 109 FL (ref 78–100)
PLATELET # BLD AUTO: 282 10E3/UL (ref 150–450)
POTASSIUM BLD-SCNC: 5 MMOL/L (ref 3.4–5.3)
RBC # BLD AUTO: 3.16 10E6/UL (ref 3.8–5.9)
SODIUM SERPL-SCNC: 137 MMOL/L (ref 133–144)
WBC # BLD AUTO: 5.5 10E3/UL (ref 4–11)

## 2022-04-15 PROCEDURE — 80158 DRUG ASSAY CYCLOSPORINE: CPT

## 2022-04-15 PROCEDURE — 80048 BASIC METABOLIC PNL TOTAL CA: CPT

## 2022-04-15 PROCEDURE — 36415 COLL VENOUS BLD VENIPUNCTURE: CPT

## 2022-04-15 PROCEDURE — 85027 COMPLETE CBC AUTOMATED: CPT

## 2022-04-15 RX ORDER — CHLORTHALIDONE 25 MG/1
TABLET ORAL
Qty: 45 TABLET | Refills: 15 | Status: SHIPPED | OUTPATIENT
Start: 2022-04-15 | End: 2023-04-19

## 2022-04-15 NOTE — TELEPHONE ENCOUNTER
----- Message from Mateo Love MD sent at 4/15/2022 12:09 PM CDT -----  Trend up in serum creatinine and recommend usual assessment for fever, recent illness, pain over kidney allograft, blood pressure and volume status, as well as would just follow for now and repeat labs.  If no improvement, may consider a kidney transplant biopsy.      LPN TASK:  How much water is he drinking per day?  Any recent illness, diarrhea, s/s of a UTI, or medication changes?  Any increased intake of alcohol or caffeine?  Pain at allograft site?  Recommend increasing hydration and repeating labs within 1 week. Please place repeat lab orders    Jerica Ramirez RN   Transplant Coordinator  [phone]

## 2022-04-15 NOTE — TELEPHONE ENCOUNTER
Call placed to patient. Patient note that he's drinking 3 12 oz classes of water a day along with coffee and other fluids. Denies any recent illness, diarrhea or medication changes. Patient vu to increase hydration and repeat labs in 1-2 weeks. Order sent. Patient unsure that there is any more that he can do to decrease his creatinine.

## 2022-04-16 LAB
CYCLOSPORINE BLD LC/MS/MS-MCNC: 107 UG/L (ref 50–400)
TME LAST DOSE: NORMAL H
TME LAST DOSE: NORMAL H

## 2022-04-21 ENCOUNTER — OFFICE VISIT (OUTPATIENT)
Dept: NEPHROLOGY | Facility: CLINIC | Age: 67
End: 2022-04-21
Attending: INTERNAL MEDICINE
Payer: COMMERCIAL

## 2022-04-21 VITALS
BODY MASS INDEX: 32.35 KG/M2 | WEIGHT: 197.1 LBS | DIASTOLIC BLOOD PRESSURE: 74 MMHG | SYSTOLIC BLOOD PRESSURE: 130 MMHG | OXYGEN SATURATION: 95 % | HEART RATE: 72 BPM

## 2022-04-21 DIAGNOSIS — Z94.0 HTN, KIDNEY TRANSPLANT RELATED: ICD-10-CM

## 2022-04-21 DIAGNOSIS — N25.81 SECONDARY HYPERPARATHYROIDISM (H): ICD-10-CM

## 2022-04-21 DIAGNOSIS — Z94.0 KIDNEY REPLACED BY TRANSPLANT: ICD-10-CM

## 2022-04-21 DIAGNOSIS — E10.22 TYPE 1 DIABETES MELLITUS WITH DIABETIC CHRONIC KIDNEY DISEASE, UNSPECIFIED CKD STAGE (H): ICD-10-CM

## 2022-04-21 DIAGNOSIS — Z94.83 PANCREAS REPLACED BY TRANSPLANT (H): ICD-10-CM

## 2022-04-21 DIAGNOSIS — I15.1 HTN, KIDNEY TRANSPLANT RELATED: ICD-10-CM

## 2022-04-21 DIAGNOSIS — D84.9 IMMUNOSUPPRESSION (H): Primary | ICD-10-CM

## 2022-04-21 PROCEDURE — 99214 OFFICE O/P EST MOD 30 MIN: CPT | Performed by: INTERNAL MEDICINE

## 2022-04-21 NOTE — PROGRESS NOTES
CHRONIC TRANSPLANT NEPHROLOGY VISIT    Assessment & Plan      Recommendations   -DEXA scan for osteoporosis screening   -Routine follow up with dermatology   -Return to clinic in 1 year     # DDKT (SPK): Stable   - Baseline Creatinine:  ~ 1.2-1.3; recently 1.3-1.6   - Proteinuria: Mild (0.5-1.0 grams)   - Date DSA Last Checked: Not Known      Latest DSA: Not checked recently due to time from transplant   - BK Viremia: Not checked recently due to time from transplant   - Kidney Tx Biopsy: No    # Immunosuppression: Cyclosporine (goal ) and Azathioprine (dose 150 mg mg daily)          - Continue with intensive monitoring of immunosuppression for efficacy and toxicity.          - Changes: No    # Infection Prophylaxis:   - PJP: None    # Hypertension: Inadequate control;  Goal BP: < 130/80   - Changes: Yes -   Start Amlodipine 5 mg every evening and Chlorthalidone 12.5 mg (half a tab) in the morning. Continue taking lisinopril 40 mg daily and Toprol  mg daily.  Advised daily BP measurements. Proper measurement technique discussed with the pt. Will call him next week for follow up on BP response.     # Diabetes, s/p Failed Pancreas Tx (SPK): Borderline control (HbA1c 7-9%) Last HbA1c: 7.2%   - Management as per primary care.    # Anemia in Chronic Renal Disease: Hgb: Stable, near normal      MONICA: No   - Iron studies: Not checked recently    # Mineral Bone Disorder:   - Secondary renal hyperparathyroidism; PTH level: Not checked recently        On treatment: None  - Vitamin D; level: Low        On supplement: Yes on vitamin D2 50,000 international unit(s) weekly and vitamin D3 2,000 daily.   - Calcium; level: Normal        On supplement: No     Recommend bone density scan. Not sure. Hip replacement. Hip replaced. R hip replaced then 5 years left done.     # Electrolytes:   - Potassium; level: Normal        On supplement: No  - Bicarbonate; level: High normal        On supplement: Yes, advised the pt to reduce  his bicarb (baking soda) to 50 %.     # Skin Cancer: New lesions: one   - Discussed sun protection and recommend regular follow up with Dermatology.   -Seeing dermatologist tomorrow.      # Medical Compliance: Yes    # Transplant History:  Etiology of Kidney Failure: Diabetes mellitus type 1  Tx: SPK  Transplant: 7/9/1989 (Kidney / Pancreas)  Significant changes in immunosuppression: None  Significant transplant-related complications: None    Transplant Office Phone Number: [phone]    Assessment and plan was discussed with the patient and he voiced his understanding and agreement.    Return visit: Return in about 1 year (around 4/21/2023).    Braeden Cody MD  Transplant Nephrology   Pager: [phone]    Chief Complaint   Mr. Vaughan is a 67 year old here for kidney transplant and immunosuppression management.    Interval:   Pt is doing well overall. Last seen by Dr. Love since March 2021. At that time, he was started on amlodipine and chlorthalidone for hypertension. Since we last saw him,     No dizziness, lightheadedness, orthostasis   Blood pressures at home have been in the 130/70s range   He has occassional dizziness.     Wt Readings from Last 4 Encounters:   04/21/22 89.4 kg (197 lb 1.6 oz)   03/18/21 86.3 kg (190 lb 4.8 oz)   05/15/18 87.3 kg (192 lb 6.4 oz)   02/08/17 88.5 kg (195 lb 3.2 oz)       He takes metoprolol at night, lisinopril about 2pm, and the other 2 in the morning.     Highest 150/70  Lowest: 100/60  More often on the lower side.   In the morning, half hour after getting up 100/60. If he measured again at 10, 110-120/60-70.   HR 60-65  No falls, no syncope.     Takes dog for afternoon hour walk and does fine. Tolerating immunosuppression well. Seeing Dermatology tomorrow. Has not had DEXA scan for some time.     ROS: 10 point ROS neg other than the symptoms noted above in the HPI.      Home BP: 150-160/. Today's BP in the clinic is 170/78    Problem List   Patient Active  Problem List   Diagnosis     Diabetes mellitus type 1 (H)     Kidney replaced by transplant     Pancreas replaced by transplant (H)     Skin cancer     HTN, kidney transplant related     Aftercare following organ transplant     Chronic kidney disease, stage 3 (H)     Immunosuppression (H)     Vitamin D deficiency     Anemia in chronic renal disease       Allergies   No Known Allergies    Medications   Current Outpatient Medications   Medication Sig     amLODIPine (NORVASC) 5 MG tablet Take 1 tablet (5 mg) by mouth daily     azaTHIOprine (IMURAN) 50 MG tablet TAKE 3 TABLETS BY MOUTH DAILY.     blood glucose monitoring (NO BRAND SPECIFIED) test strip TEST STRIPS COVERED BY PT INS. 250.00 IDDM TYPE II - TEST 3 TIMES/DAY.     Blood Glucose Monitoring Suppl (GLUCLendingRobotM BLOOD GLUCOSE MONITOR) RAIN Dispense glucose meter, test strips and lancets covered by the patient insurance. Test 3 times per day.     chlorthalidone (HYGROTON) 25 MG tablet TAKE 1/2 TABLET BY MOUTH DAILY     cycloSPORINE modified (GENERIC EQUIVALENT) 100 MG capsule TAKE 1 CAPSULE BY MOUTH 2 TIMES DAILY (TOTAL DOSE 125MG TWICE DAILY)     cycloSPORINE modified (GENERIC EQUIVALENT) 25 MG capsule TAKE 1 CAPSULE BY MOUTH 2 TIMES DAILY (TOTAL DOSE 125MG TWICE DAILY)     fluticasone (FLONASE) 50 MCG/ACT spray      insulin aspart (NOVOLOG PEN) 100 UNIT/ML injection INJECT 8UNITS BEFORE BREAKFAST AND LUNCH AND 8  UNITS WITH SUPPER     insulin glargine (LANTUS SOLOSTAR) 100 UNIT/ML pen Inject 15 Units Subcutaneous     insulin pen needle (B-D U/F) 31G X 8 MM USE TO INJECT WITH INSULIN PEN DEVICE     lisinopril (ZESTRIL) 40 MG tablet Take 40 mg by mouth daily     METOPROLOL SUCCINATE ER PO Take 100 mg by mouth daily XL     SODIUM BICARBONATE PO Take 0.5 teaspoonful by mouth daily     vitamin D2 (ERGOCALCIFEROL) 55408 units (1250 mcg) capsule Take 50,000 Units by mouth once a week     vitamin D3 (CHOLECALCIFEROL) 50 mcg (2000 units) tablet Take 1 tablet by mouth daily      No current facility-administered medications for this visit.     There are no discontinued medications.    Physical Exam   Vital Signs: /74   Pulse 72   Wt 89.4 kg (197 lb 1.6 oz)   SpO2 95%   BMI 32.35 kg/m      GENERAL APPEARANCE: alert and no distress  HENT: mouth without ulcers or lesions  LYMPHATICS: no cervical or supraclavicular nodes  RESP: lungs clear to auscultation - no rales, rhonchi or wheezes  CV: regular rhythm, normal rate, no rub, no murmur  EDEMA: no LE edema bilaterally  ABDOMEN: soft, nondistended, nontender, bowel sounds normal  MS: extremities normal - no gross deformities noted, no evidence of inflammation in joints, no muscle tenderness  SKIN: no rash  TX KIDNEY: normal  DIALYSIS ACCESS:  LUE AV fistula no palpable thrill     Data     Renal Latest Ref Rng & Units 4/15/2022 11/7/2021 3/16/2021   Na 133 - 144 mmol/L 137 135 139   Na (external) 135 - 145 mmol/L - - -   K 3.4 - 5.3 mmol/L 5.0 4.8 4.9   K (external) 3.5 - 5.0 mmol/L - - -   Cl 94 - 109 mmol/L 108 108 108   CO2 20 - 32 mmol/L 24 22 28   CO2 (external) 21 - 31 mmol/L - - -   BUN 7 - 30 mg/dL 44(H) 35(H) 22   BUN (external) 8 - 25 mg/dL - - -   Cr 0.52 - 1.25 mg/dL 1.67(H) 1.44(H) 1.38(H)   Cr (external) 0.72 - 1.25 mg/dL - - -   Glucose 70 - 99 mg/dL 142(H) 110(H) 107(H)   Glucose (external) 65 - 100 mg/dL - - -   Ca  8.5 - 10.1 mg/dL 9.3 9.1 9.2   Ca (external) 8.5 - 10.5 mg/dL - - -        Heme Latest Ref Rng & Units 4/15/2022 11/7/2021 3/16/2021   WBC 4.0 - 11.0 10e3/uL 5.5 6.2 4.9   WBC (external) 4.5 - 11.0 thou/cu mm - - -   Hgb 11.7 - 17.7 g/dL 11.6(L) 12.4 11.7(L)   Hgb (external) 13.5 - 17.5 g/dL - - -   Plt 150 - 450 10e3/uL 282 254 230   Plt (external) 140 - 440 thou/cu mm - - -   ABSOLUTE NEUTROPHIL 1.6 - 8.3 10e9/L - - -   ABSOLUTE LYMPHOCYTES 0.8 - 5.3 10e9/L - - -   ABSOLUTE MONOCYTES 0.0 - 1.3 10e9/L - - -   ABSOLUTE EOSINOPHILS 0.0 - 0.7 10e9/L - - -   ABSOLUTE BASOPHILS 0.0 - 0.2 10e9/L - - -   ABS  IMMATURE GRANULOCYTES 0 - 0.4 10e9/L - - -   ABSOLUTE NUCLEATED RBC - - - -        Pancreas Latest Ref Rng & Units 2/26/2020 12/12/2019 5/9/2019   A1C (external) <=6.4 % - - -   Amylase 30 - 110 U/L - - -   Amylase (external) 25 - 125 IU/L 85 79 90   Lipase 73 - 393 U/L - - -   Lipase (external) 25 - 125 IU/L 85 79 90     Iron studies Latest Ref Rng & Units 5/9/2018   Iron (external) 8.0 - 78.0 IU/L 16.1     [facility] Virology Latest Ref Rng & Units 9/19/2018 5/5/2017 10/7/2016   BK Quant Result Ext None detected None detected None Detected -   BK Quant Rapid Ext None detected - - None detected        Recent Labs   Lab Test 05/01/14  0750 10/28/15  0759 10/07/16  0800   DOSTAC 04/30/2014 2030 10/27/15  2015 1945 10/6/16   TACROL <3.0  Tacrolimus Reference Range   Kidney Transplant   Pediatric                      ug/L     0-3 months post transplant   10-12     3-6 months post transplant   8-10     6-12 months post transplant  6-8     >12 months post transplant   4-7   Adult     0-6 months post transplant   8-10     6-12 months post transplant  6-8     >12 months post transplant   4-6     >5 years post transplant     3-5   Heart Transplant   Pediatric     0-12 months post transplant  10-15     >12 months post transplant   5-10   Adult     0-3 months post transplant   10-15     3-6 months post transplant   8-12     6-12 months post transplant  6-12     >12 months post transplant   6-10   Lung Transplant     0-12 months post transplant  10-15     >12 months post transplant   8-12   Liver Transplant   Pediatric     0-3 months post transplant   10-15     3-6 months post transplant   8-10     >6 months post transplant    6-8   Adult     0-3 months post transplant   10-12     3-6 months post transplant   8-10     >6  months post transplant    6-8   Pancreas Transplant     0-6 months post transplant   8-10     >6 months post transplant    5-8  * <3.0  Tacrolimus Reference Range   Kidney Transplant   Pediatric                       ug/L     0-3 months post transplant   10-12     3-6 months post transplant   8-10     6-12 months post transplant  6-8     >12 months post transplant   4-7   Adult     0-6 months post transplant   8-10     6-12 months post transplant  6-8     >12 months post transplant   4-6     >5 years post transplant     3-5   Heart Transplant   Pediatric     0-12 months post transplant  10-15     >12 months post transplant   5-10   Adult     0-3 months post transplant   10-15     3-6 months post transplant   8-12     6-12 months post transplant  6-12     >12 months post transplant   6-10   Lung Transplant     0-12 months post transplant  10-15     >12 months post transplant   8-12   Liver Transplant   Pediatric     0-3 months post transplant   10-15     3-6 months post transplant   8-10     >6 months post transplant    6-8   Adult     0-3 months post transplant   10-12     3-6 months post transplant   8-10     >6  months post transplant    6-8   Pancreas Transplant     0-6 months post transplant   8-10     >6 months post transplant    5-8   This test was developed and its performance characteristics determined by the   [facility],  [facility] Laboratory. It has   not been cleared or approved by the FDA. The laboratory is regulated under CLIA   as qualified to perform high-complexity testing. This test is used for clinical   purposes. It should not be regarded as investigational or for research.  * <3.0  Tacrolimus Reference Range   Kidney Transplant   Pediatric                      ug/L     0-3 months post transplant   10-12     3-6 months post transplant   8-10     6-12 months post transplant  6-8     >12 months post transplant   4-7   Adult     0-6 months post transplant   8-10     6-12 months post transplant  6-8     >12 months post transplant   4-6     >5 years post transplant     3-5   Heart Transplant   Pediatric     0-12 months post transplant  10-15     >12 months post transplant    5-10   Adult     0-3 months post transplant   10-15     3-6 months post transplant   8-12     6-12 months post transplant  6-12     >12 months post transplant   6-10   Lung Transplant     0-12 months post transplant  10-15     >12 months post transplant   8-12   Liver Transplant   Pediatric     0-3 months post transplant   10-15     3-6 months post transplant   8-10     >6 months post transplant    6-8   Adult     0-3 months post transplant   10-12     3-6 months post transplant   8-10     >6  months post transplant    6-8   Pancreas Transplant     0-6 months post transplant   8-10     >6 months post transplant    5-8   This test was developed and its performance characteristics determined by the   [facility],  Special [facility] Laboratory. It has   not been cleared or approved by the FDA. The laboratory is regulated under CLIA   as qualified to perform high-complexity testing. This test is used for clinical   purposes. It should not be regarded as investigational or for research.  *

## 2022-04-21 NOTE — NURSING NOTE
Chief Complaint   Patient presents with     RECHECK     Follow up     Blood pressure 130/74, pulse 72, weight 89.4 kg (197 lb 1.6 oz), SpO2 95 %.    Violeta Robertson on 4/21/2022 at 2:02 PM  Patient did not know medications, list was not reviewed. Violeta Robertson on 4/21/2022 at 2:02 PM

## 2022-04-21 NOTE — LETTER
4/21/2022     RE: Mathew Vaughan  [address]  [place] [address]     Dear Colleague,    Thank you for referring your patient, Mathew Vaughan, to the [facility] [place] at [facility]. Please see a copy of my visit note below.    CHRONIC TRANSPLANT NEPHROLOGY VISIT    Assessment & Plan      Recommendations   -DEXA scan for osteoporosis screening   -Routine follow up with dermatology   -Return to clinic in 1 year     # DDKT (SPK): Stable   - Baseline Creatinine:  ~ 1.2-1.3; recently 1.3-1.6   - Proteinuria: Mild (0.5-1.0 grams)   - Date DSA Last Checked: Not Known      Latest DSA: Not checked recently due to time from transplant   - BK Viremia: Not checked recently due to time from transplant   - Kidney Tx Biopsy: No    # Immunosuppression: Cyclosporine (goal ) and Azathioprine (dose 150 mg mg daily)          - Continue with intensive monitoring of immunosuppression for efficacy and toxicity.          - Changes: No    # Infection Prophylaxis:   - PJP: None    # Hypertension: Inadequate control;  Goal BP: < 130/80   - Changes: Yes -   Start Amlodipine 5 mg every evening and Chlorthalidone 12.5 mg (half a tab) in the morning. Continue taking lisinopril 40 mg daily and Toprol  mg daily.  Advised daily BP measurements. Proper measurement technique discussed with the pt. Will call him next week for follow up on BP response.     # Diabetes, s/p Failed Pancreas Tx (SPK): Borderline control (HbA1c 7-9%) Last HbA1c: 7.2%   - Management as per primary care.    # Anemia in Chronic Renal Disease: Hgb: Stable, near normal      MONICA: No   - Iron studies: Not checked recently    # Mineral Bone Disorder:   - Secondary renal hyperparathyroidism; PTH level: Not checked recently        On treatment: None  - Vitamin D; level: Low        On supplement: Yes on vitamin D2 50,000 international unit(s) weekly and vitamin D3 2,000 daily.   -  Calcium; level: Normal        On supplement: No     Recommend bone density scan. Not sure. Hip replacement. Hip replaced. R hip replaced then 5 years left done.     # Electrolytes:   - Potassium; level: Normal        On supplement: No  - Bicarbonate; level: High normal        On supplement: Yes, advised the pt to reduce his bicarb (baking soda) to 50 %.     # Skin Cancer: New lesions: one   - Discussed sun protection and recommend regular follow up with Dermatology.   -Seeing dermatologist tomorrow.      # Medical Compliance: Yes    # Transplant History:  Etiology of Kidney Failure: Diabetes mellitus type 1  Tx: SPK  Transplant: 7/9/1989 (Kidney / Pancreas)  Significant changes in immunosuppression: None  Significant transplant-related complications: None    Transplant Office Phone Number: [phone]    Assessment and plan was discussed with the patient and he voiced his understanding and agreement.    Return visit: Return in about 1 year (around 4/21/2023).    Braeden Cody MD  Transplant Nephrology   Pager: [phone]    Chief Complaint   Mr. Vaughan is a 67 year old here for kidney transplant and immunosuppression management.    Interval:   Pt is doing well overall. Last seen by Dr. Love since March 2021. At that time, he was started on amlodipine and chlorthalidone for hypertension. Since we last saw him,     No dizziness, lightheadedness, orthostasis   Blood pressures at home have been in the 130/70s range   He has occassional dizziness.     Wt Readings from Last 4 Encounters:   04/21/22 89.4 kg (197 lb 1.6 oz)   03/18/21 86.3 kg (190 lb 4.8 oz)   05/15/18 87.3 kg (192 lb 6.4 oz)   02/08/17 88.5 kg (195 lb 3.2 oz)       He takes metoprolol at night, lisinopril about 2pm, and the other 2 in the morning.     Highest 150/70  Lowest: 100/60  More often on the lower side.   In the morning, half hour after getting up 100/60. If he measured again at 10, 110-120/60-70.   HR 60-65  No falls, no syncope.      Takes dog for afternoon hour walk and does fine. Tolerating immunosuppression well. Seeing Dermatology tomorrow. Has not had DEXA scan for some time.     ROS: 10 point ROS neg other than the symptoms noted above in the HPI.      Home BP: 150-160/. Today's BP in the clinic is 170/78    Problem List   Patient Active Problem List   Diagnosis     Diabetes mellitus type 1 (H)     Kidney replaced by transplant     Pancreas replaced by transplant (H)     Skin cancer     HTN, kidney transplant related     Aftercare following organ transplant     Chronic kidney disease, stage 3 (H)     Immunosuppression (H)     Vitamin D deficiency     Anemia in chronic renal disease       Allergies   No Known Allergies    Medications   Current Outpatient Medications   Medication Sig     amLODIPine (NORVASC) 5 MG tablet Take 1 tablet (5 mg) by mouth daily     azaTHIOprine (IMURAN) 50 MG tablet TAKE 3 TABLETS BY MOUTH DAILY.     blood glucose monitoring (NO BRAND SPECIFIED) test strip TEST STRIPS COVERED BY PT INS. 250.00 IDDM TYPE II - TEST 3 TIMES/DAY.     Blood Glucose Monitoring Suppl ([employer] BLOOD GLUCOSE MONITOR) RAIN Dispense glucose meter, test strips and lancets covered by the patient insurance. Test 3 times per day.     chlorthalidone (HYGROTON) 25 MG tablet TAKE 1/2 TABLET BY MOUTH DAILY     cycloSPORINE modified (GENERIC EQUIVALENT) 100 MG capsule TAKE 1 CAPSULE BY MOUTH 2 TIMES DAILY (TOTAL DOSE 125MG TWICE DAILY)     cycloSPORINE modified (GENERIC EQUIVALENT) 25 MG capsule TAKE 1 CAPSULE BY MOUTH 2 TIMES DAILY (TOTAL DOSE 125MG TWICE DAILY)     fluticasone (FLONASE) 50 MCG/ACT spray      insulin aspart (NOVOLOG PEN) 100 UNIT/ML injection INJECT 8UNITS BEFORE BREAKFAST AND LUNCH AND 8  UNITS WITH SUPPER     insulin glargine (LANTUS SOLOSTAR) 100 UNIT/ML pen Inject 15 Units Subcutaneous     insulin pen needle (B-D U/F) 31G X 8 MM USE TO INJECT WITH INSULIN PEN DEVICE     lisinopril (ZESTRIL) 40 MG tablet Take 40 mg by  mouth daily     METOPROLOL SUCCINATE ER PO Take 100 mg by mouth daily XL     SODIUM BICARBONATE PO Take 0.5 teaspoonful by mouth daily     vitamin D2 (ERGOCALCIFEROL) 10632 units (1250 mcg) capsule Take 50,000 Units by mouth once a week     vitamin D3 (CHOLECALCIFEROL) 50 mcg (2000 units) tablet Take 1 tablet by mouth daily     No current facility-administered medications for this visit.     There are no discontinued medications.    Physical Exam   Vital Signs: /74   Pulse 72   Wt 89.4 kg (197 lb 1.6 oz)   SpO2 95%   BMI 32.35 kg/m      GENERAL APPEARANCE: alert and no distress  HENT: mouth without ulcers or lesions  LYMPHATICS: no cervical or supraclavicular nodes  RESP: lungs clear to auscultation - no rales, rhonchi or wheezes  CV: regular rhythm, normal rate, no rub, no murmur  EDEMA: no LE edema bilaterally  ABDOMEN: soft, nondistended, nontender, bowel sounds normal  MS: extremities normal - no gross deformities noted, no evidence of inflammation in joints, no muscle tenderness  SKIN: no rash  TX KIDNEY: normal  DIALYSIS ACCESS:  LUE AV fistula no palpable thrill     Data     Renal Latest Ref Rng & Units 4/15/2022 11/7/2021 3/16/2021   Na 133 - 144 mmol/L 137 135 139   Na (external) 135 - 145 mmol/L - - -   K 3.4 - 5.3 mmol/L 5.0 4.8 4.9   K (external) 3.5 - 5.0 mmol/L - - -   Cl 94 - 109 mmol/L 108 108 108   CO2 20 - 32 mmol/L 24 22 28   CO2 (external) 21 - 31 mmol/L - - -   BUN 7 - 30 mg/dL 44(H) 35(H) 22   BUN (external) 8 - 25 mg/dL - - -   Cr 0.52 - 1.25 mg/dL 1.67(H) 1.44(H) 1.38(H)   Cr (external) 0.72 - 1.25 mg/dL - - -   Glucose 70 - 99 mg/dL 142(H) 110(H) 107(H)   Glucose (external) 65 - 100 mg/dL - - -   Ca  8.5 - 10.1 mg/dL 9.3 9.1 9.2   Ca (external) 8.5 - 10.5 mg/dL - - -        Heme Latest Ref Rng & Units 4/15/2022 11/7/2021 3/16/2021   WBC 4.0 - 11.0 10e3/uL 5.5 6.2 4.9   WBC (external) 4.5 - 11.0 thou/cu mm - - -   Hgb 11.7 - 17.7 g/dL 11.6(L) 12.4 11.7(L)   Hgb (external) 13.5 -  17.5 g/dL - - -   Plt 150 - 450 10e3/uL 282 254 230   Plt (external) 140 - 440 thou/cu mm - - -   ABSOLUTE NEUTROPHIL 1.6 - 8.3 10e9/L - - -   ABSOLUTE LYMPHOCYTES 0.8 - 5.3 10e9/L - - -   ABSOLUTE MONOCYTES 0.0 - 1.3 10e9/L - - -   ABSOLUTE EOSINOPHILS 0.0 - 0.7 10e9/L - - -   ABSOLUTE BASOPHILS 0.0 - 0.2 10e9/L - - -   ABS IMMATURE GRANULOCYTES 0 - 0.4 10e9/L - - -   ABSOLUTE NUCLEATED RBC - - - -        Pancreas Latest Ref Rng & Units 2/26/2020 12/12/2019 5/9/2019   A1C (external) <=6.4 % - - -   Amylase 30 - 110 U/L - - -   Amylase (external) 25 - 125 IU/L 85 79 90   Lipase 73 - 393 U/L - - -   Lipase (external) 25 - 125 IU/L 85 79 90     Iron studies Latest Ref Rng & Units 5/9/2018   Iron (external) 8.0 - 78.0 IU/L 16.1     [facility] T[facility] Virology Latest Ref Rng & Units 9/19/2018 5/5/2017 10/7/2016   BK Quant Result Ext None detected None detected None Detected -   BK Quant Rapid Ext None detected - - None detected        Recent Labs   Lab Test 05/01/14  0750 10/28/15  0759 10/07/16  0800   DOSTAC 04/30/2014 2030 10/27/15  2015 1945 10/6/16   TACROL <3.0  Tacrolimus Reference Range   Kidney Transplant   Pediatric                      ug/L     0-3 months post transplant   10-12     3-6 months post transplant   8-10     6-12 months post transplant  6-8     >12 months post transplant   4-7   Adult     0-6 months post transplant   8-10     6-12 months post transplant  6-8     >12 months post transplant   4-6     >5 years post transplant     3-5   Heart Transplant   Pediatric     0-12 months post transplant  10-15     >12 months post transplant   5-10   Adult     0-3 months post transplant   10-15     3-6 months post transplant   8-12     6-12 months post transplant  6-12     >12 months post transplant   6-10   Lung Transplant     0-12 months post transplant  10-15     >12 months post transplant   8-12   Liver Transplant   Pediatric     0-3 months post transplant   10-15     3-6 months post transplant   8-10     >6 months  post transplant    6-8   Adult     0-3 months post transplant   10-12     3-6 months post transplant   8-10     >6  months post transplant    6-8   Pancreas Transplant     0-6 months post transplant   8-10     >6 months post transplant    5-8  * <3.0  Tacrolimus Reference Range   Kidney Transplant   Pediatric                      ug/L     0-3 months post transplant   10-12     3-6 months post transplant   8-10     6-12 months post transplant  6-8     >12 months post transplant   4-7   Adult     0-6 months post transplant   8-10     6-12 months post transplant  6-8     >12 months post transplant   4-6     >5 years post transplant     3-5   Heart Transplant   Pediatric     0-12 months post transplant  10-15     >12 months post transplant   5-10   Adult     0-3 months post transplant   10-15     3-6 months post transplant   8-12     6-12 months post transplant  6-12     >12 months post transplant   6-10   Lung Transplant     0-12 months post transplant  10-15     >12 months post transplant   8-12   Liver Transplant   Pediatric     0-3 months post transplant   10-15     3-6 months post transplant   8-10     >6 months post transplant    6-8   Adult     0-3 months post transplant   10-12     3-6 months post transplant   8-10     >6  months post transplant    6-8   Pancreas Transplant     0-6 months post transplant   8-10     >6 months post transplant    5-8   This test was developed and its performance characteristics determined by the   [facility],  Special Chemistry Laboratory. It has   not been cleared or approved by the FDA. The laboratory is regulated under CLIA   as qualified to perform high-complexity testing. This test is used for clinical   purposes. It should not be regarded as investigational or for research.  * <3.0  Tacrolimus Reference Range   Kidney Transplant   Pediatric                      ug/L     0-3 months post transplant   10-12     3-6 months post transplant   8-10      6-12 months post transplant  6-8     >12 months post transplant   4-7   Adult     0-6 months post transplant   8-10     6-12 months post transplant  6-8     >12 months post transplant   4-6     >5 years post transplant     3-5   Heart Transplant   Pediatric     0-12 months post transplant  10-15     >12 months post transplant   5-10   Adult     0-3 months post transplant   10-15     3-6 months post transplant   8-12     6-12 months post transplant  6-12     >12 months post transplant   6-10   Lung Transplant     0-12 months post transplant  10-15     >12 months post transplant   8-12   Liver Transplant   Pediatric     0-3 months post transplant   10-15     3-6 months post transplant   8-10     >6 months post transplant    6-8   Adult     0-3 months post transplant   10-12     3-6 months post transplant   8-10     >6  months post transplant    6-8   Pancreas Transplant     0-6 months post transplant   8-10     >6 months post transplant    5-8   This test was developed and its performance characteristics determined by the   [facility],  Special Chemistry Laboratory. It has   not been cleared or approved by the FDA. The laboratory is regulated under CLIA   as qualified to perform high-complexity testing. This test is used for clinical   purposes. It should not be regarded as investigational or for research.  *     Again, thank you for allowing me to participate in the care of your patient.      Sincerely,    Braeden Cody MD

## 2022-04-24 ENCOUNTER — HEALTH MAINTENANCE LETTER (OUTPATIENT)
Age: 67
End: 2022-04-24

## 2022-05-09 PROBLEM — N25.81 SECONDARY HYPERPARATHYROIDISM (H): Status: ACTIVE | Noted: 2022-05-09

## 2022-08-30 PROBLEM — L03.115 CELLULITIS OF RIGHT LOWER EXTREMITY: Status: ACTIVE | Noted: 2021-07-11

## 2022-08-30 PROBLEM — L03.115 CELLULITIS OF RIGHT LOWER EXTREMITY: Status: ACTIVE | Noted: 2022-07-11

## 2022-08-30 PROBLEM — H60.12 CELLULITIS OF LEFT EAR: Status: ACTIVE | Noted: 2021-10-28

## 2022-11-19 ENCOUNTER — HEALTH MAINTENANCE LETTER (OUTPATIENT)
Age: 67
End: 2022-11-19

## 2022-12-03 ENCOUNTER — LAB (OUTPATIENT)
Dept: LAB | Facility: CLINIC | Age: 67
End: 2022-12-03
Payer: COMMERCIAL

## 2022-12-03 DIAGNOSIS — Z94.83 PANCREAS REPLACED BY TRANSPLANT (H): ICD-10-CM

## 2022-12-03 DIAGNOSIS — Z94.0 KIDNEY REPLACED BY TRANSPLANT: ICD-10-CM

## 2022-12-03 DIAGNOSIS — Z48.298 AFTERCARE FOLLOWING ORGAN TRANSPLANT: ICD-10-CM

## 2022-12-03 LAB
ERYTHROCYTE [DISTWIDTH] IN BLOOD BY AUTOMATED COUNT: 12.8 % (ref 10–15)
HCT VFR BLD AUTO: 35 % (ref 35–53)
HGB BLD-MCNC: 11.9 G/DL (ref 11.7–17.7)
MCH RBC QN AUTO: 36.4 PG (ref 26.5–33)
MCHC RBC AUTO-ENTMCNC: 34 G/DL (ref 31.5–36.5)
MCV RBC AUTO: 107 FL (ref 78–100)
PLATELET # BLD AUTO: 303 10E3/UL (ref 150–450)
RBC # BLD AUTO: 3.27 10E6/UL (ref 3.8–5.9)
WBC # BLD AUTO: 6.6 10E3/UL (ref 4–11)

## 2022-12-03 PROCEDURE — 80048 BASIC METABOLIC PNL TOTAL CA: CPT

## 2022-12-03 PROCEDURE — 80158 DRUG ASSAY CYCLOSPORINE: CPT

## 2022-12-03 PROCEDURE — 36415 COLL VENOUS BLD VENIPUNCTURE: CPT

## 2022-12-03 PROCEDURE — 85027 COMPLETE CBC AUTOMATED: CPT

## 2022-12-04 LAB
CYCLOSPORINE BLD LC/MS/MS-MCNC: 93 UG/L (ref 50–400)
TME LAST DOSE: NORMAL H
TME LAST DOSE: NORMAL H

## 2022-12-05 LAB
ANION GAP SERPL CALCULATED.3IONS-SCNC: 5 MMOL/L (ref 3–14)
BUN SERPL-MCNC: 41 MG/DL (ref 7–30)
CALCIUM SERPL-MCNC: 9 MG/DL (ref 8.5–10.1)
CHLORIDE BLD-SCNC: 111 MMOL/L (ref 94–109)
CO2 SERPL-SCNC: 24 MMOL/L (ref 20–32)
CREAT SERPL-MCNC: 1.64 MG/DL (ref 0.52–1.25)
GFR SERPL CREATININE-BSD FRML MDRD: 45 ML/MIN/1.73M2
GLUCOSE BLD-MCNC: 166 MG/DL (ref 70–99)
POTASSIUM BLD-SCNC: 5.2 MMOL/L (ref 3.4–5.3)
SODIUM SERPL-SCNC: 140 MMOL/L (ref 133–144)

## 2023-02-27 DIAGNOSIS — Z94.0 KIDNEY REPLACED BY TRANSPLANT: ICD-10-CM

## 2023-02-27 DIAGNOSIS — Z94.83 PANCREAS REPLACED BY TRANSPLANT (H): Primary | ICD-10-CM

## 2023-02-27 DIAGNOSIS — Z48.298 AFTERCARE FOLLOWING ORGAN TRANSPLANT: Primary | ICD-10-CM

## 2023-02-27 RX ORDER — AZATHIOPRINE 50 MG/1
150 TABLET ORAL DAILY
Qty: 270 TABLET | Refills: 3 | Status: SHIPPED | OUTPATIENT
Start: 2023-02-27 | End: 2023-08-02

## 2023-03-13 DIAGNOSIS — Z94.0 KIDNEY REPLACED BY TRANSPLANT: ICD-10-CM

## 2023-03-13 DIAGNOSIS — Z94.83 PANCREAS REPLACED BY TRANSPLANT (H): Primary | ICD-10-CM

## 2023-03-13 RX ORDER — CYCLOSPORINE 100 MG/1
100 CAPSULE, LIQUID FILLED ORAL 2 TIMES DAILY
Qty: 60 CAPSULE | Refills: 11 | Status: SHIPPED | OUTPATIENT
Start: 2023-03-13 | End: 2023-04-17

## 2023-03-13 RX ORDER — CYCLOSPORINE 25 MG/1
25 CAPSULE, LIQUID FILLED ORAL 2 TIMES DAILY
Qty: 60 CAPSULE | Refills: 11 | Status: SHIPPED | OUTPATIENT
Start: 2023-03-13 | End: 2023-04-17

## 2023-03-28 ENCOUNTER — LAB (OUTPATIENT)
Dept: LAB | Facility: CLINIC | Age: 68
End: 2023-03-28
Payer: COMMERCIAL

## 2023-03-28 DIAGNOSIS — Z94.0 IMMUNOSUPPRESSIVE MANAGEMENT ENCOUNTER FOLLOWING KIDNEY TRANSPLANT: ICD-10-CM

## 2023-03-28 DIAGNOSIS — Z94.83 PANCREAS REPLACED BY TRANSPLANT (H): ICD-10-CM

## 2023-03-28 DIAGNOSIS — Z79.899 IMMUNOSUPPRESSIVE MANAGEMENT ENCOUNTER FOLLOWING KIDNEY TRANSPLANT: ICD-10-CM

## 2023-03-28 DIAGNOSIS — Z94.0 KIDNEY TRANSPLANTED: ICD-10-CM

## 2023-03-28 DIAGNOSIS — Z94.0 KIDNEY REPLACED BY TRANSPLANT: ICD-10-CM

## 2023-03-28 DIAGNOSIS — Z48.298 AFTERCARE FOLLOWING ORGAN TRANSPLANT: ICD-10-CM

## 2023-03-28 LAB
ALBUMIN MFR UR ELPH: 48.9 MG/DL (ref 1–14)
ANION GAP SERPL CALCULATED.3IONS-SCNC: 5 MMOL/L (ref 3–14)
BUN SERPL-MCNC: 46 MG/DL (ref 7–30)
CALCIUM SERPL-MCNC: 9.2 MG/DL (ref 8.5–10.1)
CHLORIDE BLD-SCNC: 112 MMOL/L (ref 94–109)
CO2 SERPL-SCNC: 19 MMOL/L (ref 20–32)
CREAT SERPL-MCNC: 1.73 MG/DL (ref 0.52–1.25)
CREAT UR-MCNC: 65.4 MG/DL
CYCLOSPORINE BLD LC/MS/MS-MCNC: 103 UG/L (ref 50–400)
ERYTHROCYTE [DISTWIDTH] IN BLOOD BY AUTOMATED COUNT: 13.5 % (ref 10–15)
GFR SERPL CREATININE-BSD FRML MDRD: 42 ML/MIN/1.73M2
GLUCOSE BLD-MCNC: 156 MG/DL (ref 70–99)
HCT VFR BLD AUTO: 35.1 % (ref 35–53)
HGB BLD-MCNC: 12 G/DL (ref 11.7–17.7)
MCH RBC QN AUTO: 36.7 PG (ref 26.5–33)
MCHC RBC AUTO-ENTMCNC: 34.2 G/DL (ref 31.5–36.5)
MCV RBC AUTO: 107 FL (ref 78–100)
PLATELET # BLD AUTO: 251 10E3/UL (ref 150–450)
POTASSIUM BLD-SCNC: 5.2 MMOL/L (ref 3.4–5.3)
PROT/CREAT 24H UR: 0.75 MG/MG CR (ref 0–0.2)
RBC # BLD AUTO: 3.27 10E6/UL (ref 3.8–5.9)
SODIUM SERPL-SCNC: 136 MMOL/L (ref 133–144)
TME LAST DOSE: NORMAL H
TME LAST DOSE: NORMAL H
WBC # BLD AUTO: 4.8 10E3/UL (ref 4–11)

## 2023-03-28 PROCEDURE — 80158 DRUG ASSAY CYCLOSPORINE: CPT

## 2023-03-28 PROCEDURE — 36415 COLL VENOUS BLD VENIPUNCTURE: CPT

## 2023-03-28 PROCEDURE — 85027 COMPLETE CBC AUTOMATED: CPT

## 2023-03-28 PROCEDURE — 84156 ASSAY OF PROTEIN URINE: CPT

## 2023-03-28 PROCEDURE — 80048 BASIC METABOLIC PNL TOTAL CA: CPT

## 2023-04-17 ENCOUNTER — TELEPHONE (OUTPATIENT)
Dept: TRANSPLANT | Facility: CLINIC | Age: 68
End: 2023-04-17
Payer: COMMERCIAL

## 2023-04-17 DIAGNOSIS — Z94.83 PANCREAS REPLACED BY TRANSPLANT (H): Primary | ICD-10-CM

## 2023-04-17 DIAGNOSIS — Z94.0 KIDNEY REPLACED BY TRANSPLANT: ICD-10-CM

## 2023-04-17 RX ORDER — CYCLOSPORINE 100 MG/1
100 CAPSULE, LIQUID FILLED ORAL 2 TIMES DAILY
Qty: 60 CAPSULE | Refills: 11 | Status: SHIPPED | OUTPATIENT
Start: 2023-04-17 | End: 2023-04-17

## 2023-04-17 RX ORDER — CYCLOSPORINE 25 MG/1
25 CAPSULE, LIQUID FILLED ORAL 2 TIMES DAILY
Qty: 60 CAPSULE | Refills: 11 | Status: SHIPPED | OUTPATIENT
Start: 2023-04-17 | End: 2023-08-02

## 2023-04-17 NOTE — TELEPHONE ENCOUNTER
Provider Call: Medication Refill  Route to LPN  Pharmacy Name: [facility]  Pharmacy Location: [facility]  Name of Medication: CSA Dose: 100 mg  30 day with refills  When will the patient be out of this medication?: Less than 3 days (Route high priority)  Callback needed? No

## 2023-04-18 RX ORDER — CYCLOSPORINE 100 MG/1
100 CAPSULE, LIQUID FILLED ORAL 2 TIMES DAILY
Qty: 60 CAPSULE | Refills: 11 | Status: SHIPPED | OUTPATIENT
Start: 2023-04-18 | End: 2024-01-12

## 2023-04-19 ENCOUNTER — MYC MEDICAL ADVICE (OUTPATIENT)
Dept: TRANSPLANT | Facility: CLINIC | Age: 68
End: 2023-04-19
Payer: COMMERCIAL

## 2023-04-19 DIAGNOSIS — Z94.0 HYPERTENSION DUE TO KIDNEY TRANSPLANT: Primary | ICD-10-CM

## 2023-04-19 DIAGNOSIS — I15.1 HYPERTENSION DUE TO KIDNEY TRANSPLANT: Primary | ICD-10-CM

## 2023-04-19 RX ORDER — CHLORTHALIDONE 25 MG/1
12.5 TABLET ORAL DAILY
Qty: 45 TABLET | Refills: 0 | Status: SHIPPED | OUTPATIENT
Start: 2023-04-19 | End: 2023-07-06

## 2023-06-01 ENCOUNTER — HEALTH MAINTENANCE LETTER (OUTPATIENT)
Age: 68
End: 2023-06-01

## 2023-07-06 DIAGNOSIS — I15.1 HYPERTENSION DUE TO KIDNEY TRANSPLANT: Primary | ICD-10-CM

## 2023-07-06 DIAGNOSIS — Z94.0 HYPERTENSION DUE TO KIDNEY TRANSPLANT: Primary | ICD-10-CM

## 2023-07-06 RX ORDER — CHLORTHALIDONE 25 MG/1
12.5 TABLET ORAL DAILY
Qty: 45 TABLET | Refills: 0 | Status: SHIPPED | OUTPATIENT
Start: 2023-07-06 | End: 2023-08-02

## 2023-07-30 ENCOUNTER — LAB (OUTPATIENT)
Dept: LAB | Facility: CLINIC | Age: 68
End: 2023-07-30
Payer: COMMERCIAL

## 2023-07-30 DIAGNOSIS — Z94.83 PANCREAS REPLACED BY TRANSPLANT (H): ICD-10-CM

## 2023-07-30 DIAGNOSIS — Z94.0 KIDNEY REPLACED BY TRANSPLANT: ICD-10-CM

## 2023-07-30 LAB
ANION GAP SERPL CALCULATED.3IONS-SCNC: 11 MMOL/L (ref 7–15)
BUN SERPL-MCNC: 31.1 MG/DL (ref 8–23)
CALCIUM SERPL-MCNC: 9.3 MG/DL (ref 8.8–10.2)
CHLORIDE SERPL-SCNC: 107 MMOL/L (ref 98–107)
CREAT SERPL-MCNC: 1.54 MG/DL (ref 0.51–1.17)
DEPRECATED HCO3 PLAS-SCNC: 18 MMOL/L (ref 22–29)
ERYTHROCYTE [DISTWIDTH] IN BLOOD BY AUTOMATED COUNT: 12.7 % (ref 10–15)
GFR SERPL CREATININE-BSD FRML MDRD: 49 ML/MIN/1.73M2
GLUCOSE SERPL-MCNC: 103 MG/DL (ref 70–99)
HCT VFR BLD AUTO: 32.8 % (ref 35–53)
HGB BLD-MCNC: 11.5 G/DL (ref 11.7–17.7)
MCH RBC QN AUTO: 36.1 PG (ref 26.5–33)
MCHC RBC AUTO-ENTMCNC: 35.1 G/DL (ref 31.5–36.5)
MCV RBC AUTO: 103 FL (ref 78–100)
PLATELET # BLD AUTO: 281 10E3/UL (ref 150–450)
POTASSIUM SERPL-SCNC: 5.2 MMOL/L (ref 3.4–5.3)
RBC # BLD AUTO: 3.19 10E6/UL (ref 3.8–5.9)
SODIUM SERPL-SCNC: 136 MMOL/L (ref 136–145)
WBC # BLD AUTO: 5.8 10E3/UL (ref 4–11)

## 2023-07-30 PROCEDURE — 80048 BASIC METABOLIC PNL TOTAL CA: CPT

## 2023-07-30 PROCEDURE — 80158 DRUG ASSAY CYCLOSPORINE: CPT

## 2023-07-30 PROCEDURE — 36415 COLL VENOUS BLD VENIPUNCTURE: CPT

## 2023-07-30 PROCEDURE — 85027 COMPLETE CBC AUTOMATED: CPT

## 2023-07-31 ENCOUNTER — TELEPHONE (OUTPATIENT)
Dept: TRANSPLANT | Facility: CLINIC | Age: 68
End: 2023-07-31
Payer: COMMERCIAL

## 2023-07-31 DIAGNOSIS — Z94.0 KIDNEY REPLACED BY TRANSPLANT: Primary | ICD-10-CM

## 2023-07-31 DIAGNOSIS — Z94.83 PANCREAS REPLACED BY TRANSPLANT (H): ICD-10-CM

## 2023-07-31 LAB
CYCLOSPORINE BLD LC/MS/MS-MCNC: 88 UG/L (ref 50–400)
TME LAST DOSE: NORMAL H
TME LAST DOSE: NORMAL H

## 2023-07-31 NOTE — TELEPHONE ENCOUNTER
ISSUE: co2: 18 on 7/30/2023,     PLAN: call patient to assess for diarrhea.  Ask patient if he is taking his prescribed sodium bicarbonate,  baking soda 0.5 daily    OUTCOME:  patient confirms he is taking 0.5 tsp of baking soda daily.  Patient states he had to [place]  He has questions regarding his CSA.     Patient scheduled to be seen virtually tomorrow 8/2/2023 at 1030 with Dr Eduardo Martinez.  Provider aware.    Jerica Ramirez RN   Transplant Coordinator  [phone]

## 2023-08-01 ASSESSMENT — PAIN SCALES - GENERAL: PAINLEVEL: NO PAIN (0)

## 2023-08-02 ENCOUNTER — VIRTUAL VISIT (OUTPATIENT)
Dept: TRANSPLANT | Facility: CLINIC | Age: 68
End: 2023-08-02
Attending: INTERNAL MEDICINE
Payer: COMMERCIAL

## 2023-08-02 VITALS
SYSTOLIC BLOOD PRESSURE: 125 MMHG | HEIGHT: 65 IN | BODY MASS INDEX: 30.82 KG/M2 | HEART RATE: 63 BPM | WEIGHT: 185 LBS | DIASTOLIC BLOOD PRESSURE: 60 MMHG

## 2023-08-02 DIAGNOSIS — D84.9 IMMUNOSUPPRESSION (H): ICD-10-CM

## 2023-08-02 DIAGNOSIS — I15.1 HTN, KIDNEY TRANSPLANT RELATED: ICD-10-CM

## 2023-08-02 DIAGNOSIS — Z94.83 PANCREAS REPLACED BY TRANSPLANT (H): ICD-10-CM

## 2023-08-02 DIAGNOSIS — C44.90 SKIN CANCER: ICD-10-CM

## 2023-08-02 DIAGNOSIS — Z94.0 KIDNEY REPLACED BY TRANSPLANT: Primary | ICD-10-CM

## 2023-08-02 DIAGNOSIS — E10.21 TYPE 1 DIABETES MELLITUS WITH NEPHROPATHY (H): ICD-10-CM

## 2023-08-02 DIAGNOSIS — Z94.0 HTN, KIDNEY TRANSPLANT RELATED: ICD-10-CM

## 2023-08-02 DIAGNOSIS — Z48.298 AFTERCARE FOLLOWING ORGAN TRANSPLANT: ICD-10-CM

## 2023-08-02 DIAGNOSIS — K52.9 CHRONIC DIARRHEA: ICD-10-CM

## 2023-08-02 PROCEDURE — 99215 OFFICE O/P EST HI 40 MIN: CPT | Mod: VID | Performed by: INTERNAL MEDICINE

## 2023-08-02 PROCEDURE — 99417 PROLNG OP E/M EACH 15 MIN: CPT | Performed by: INTERNAL MEDICINE

## 2023-08-02 RX ORDER — AZATHIOPRINE 50 MG/1
125 TABLET ORAL DAILY
Qty: 270 TABLET | Refills: 3 | Status: SHIPPED | OUTPATIENT
Start: 2023-08-02 | End: 2024-08-27

## 2023-08-02 RX ORDER — INSULIN GLARGINE 100 [IU]/ML
100 INJECTION, SOLUTION SUBCUTANEOUS DAILY
COMMUNITY
Start: 2021-03-01

## 2023-08-02 NOTE — PROGRESS NOTES
Virtual Visit Details    Type of service:  Video Visit     Originating Location (pt. Location): Home    Distant Location (provider location):  On-site  Platform used for Video Visit: Nany

## 2023-08-02 NOTE — LETTER
8/2/2023         RE: Mathew Vaughan  [address]  [place] [address]        Dear Colleague,    Thank you for referring your patient, Mathew Vaughan, to the [facility]. Please see a copy of my visit note below.    TRANSPLANT NEPHROLOGY CHRONIC POST TRANSPLANT VISIT    Assessment & Plan     # DDKT (SPK): Stable kidney function, failed pancreas               - Baseline Creatinine:  ~ 1.3-1.6              - Proteinuria: Mild (0.5-1.0 grams)              - Date DSA Last Checked: Not Known      Latest DSA: Not checked recently due to time from transplant              - BK Viremia: Not checked recently due to time from transplant              - Kidney Tx Biopsy: No     # Immunosuppression: Cyclosporine (goal ) and Azathioprine (dose 125 mg mg daily)          - Continue with intensive monitoring of immunosuppression for efficacy and toxicity.          - Changes: yes reduce CSA goal to 50-75 if next level>75 can reduce dose to 75 mg po bid , will consider AZA to mTOR switch given recurrent skin Ca, will avoid MMF  switch given new onset diarrhea. after the diarrhea w/up complete but can look into coverage for everolimus 0.75 mg po bid /sirolimus 1mg po qd, check UPC,  LFT, lipid panel with next labs. Will  further about side effects next visit   - Notes: previously on prednisone then stopped years ago     # Infection Prophylaxis:   - PJP: None     # Hypertension: acceptable control;             Goal BP: < 130/80              - Changes: not at this time, continue to monitor home BP readings and f/up PCP, no longer taking chlorthalidone     # Chronic diarrhea:   - x 6 months, possible weight loss-intentional, no abdominal pain, night sweats   - stool studies GENARO and CMV pcr, EBV pcr   - colonoscopy +biopsies and cmv stain    # Diabetes, s/p Failed Pancreas Tx (SPK): Borderline control (HbA1c 7-9%)      Last HbA1c: 7.2%              - Management as per primary care.     #  Anemia in Chronic Renal Disease: Hgb: Stable, near normal      MONICA: No              - Iron studies: Not checked recently     # Mineral Bone Disorder:   - Secondary renal hyperparathyroidism; PTH level: Not checked recently        On treatment: None  - Vitamin D; level: Low        On supplement: Yes  - Calcium; level: Normal        On supplement: No     # Electrolytes:   - Potassium; level: Normal        On supplement: No  - Bicarbonate; level: low       On supplement: Yes, increase baking soda 1 tsp       # Skin Cancer: New lesions:every 3 months, SCC and BCC requiring Mohs    - discussed IS changes. MMF not an option now given new onset diarrhea, would consider AZA to mTOR switch after the diarrhea w/up complete but  can look into  coverage for everolimus 0.75 mg po bid /sirolimus 1mg po qd, check UPC, LFT, lipid panel with next labs. Will  further about  side effects next visit              - Discussed sun protection and recommend regular follow up with Dermatology.                # Transplant History:  Etiology of Kidney Failure: Diabetes mellitus type 1  Tx: SPK  Transplant: 7/9/1989 (Kidney / Pancreas)  Significant changes in immunosuppression: None    Transplant Office Phone Number: [phone]    Assessment and plan was discussed with the patient and he voiced his understanding and agreement.    Return visit: 4 months    Teri Stearns MD    Chief Complaint  Mr. Vaughan is a 68 year old here for kidney transplant, pancreas transplant and immunosuppression management.    History of Present Illness    Last visit with  4/2022    Feels good overall  New onset diarrhea x 6 months no abdominal pain, No fevers chills, night sweats. No recent colonoscopy for years and not on MMF   Very active and walks his dog for ~ miles regularly  One right ankle swelling for a while after an injury   Excellent DM control GrA1V-2.1%, on insulin  No recent illness or hospitalization.     Current IS /100 AZA  125  Home BP: 120/60, 53    Ca screen: due for colonoscopy , dermatology visit    Problem List  Patient Active Problem List   Diagnosis     Diabetes mellitus type 1 (H)     Kidney replaced by transplant     Pancreas replaced by transplant (H)     Skin cancer     HTN, kidney transplant related     Aftercare following organ transplant     Chronic kidney disease, stage 3 (H)     Immunosuppression (H)     Vitamin D deficiency     Anemia in chronic renal disease     Secondary hyperparathyroidism (H)     Cellulitis of left ear     Cellulitis of right lower extremity       Allergies  No Known Allergies    Medications  Current Outpatient Medications   Medication Sig     amLODIPine (NORVASC) 5 MG tablet Take 1 tablet (5 mg) by mouth daily     azaTHIOprine (IMURAN) 50 MG tablet Take 3 tablets (150 mg) by mouth daily     blood glucose monitoring (NO BRAND SPECIFIED) test strip TEST STRIPS COVERED BY PT INS. 250.00 IDDM TYPE II - TEST 3 TIMES/DAY.     Blood Glucose Monitoring Suppl (GLUCOCOM BLOOD GLUCOSE MONITOR) RAIN Dispense glucose meter, test strips and lancets covered by the patient insurance. Test 3 times per day.     chlorthalidone (HYGROTON) 25 MG tablet Take 0.5 tablets (12.5 mg) by mouth daily     cycloSPORINE modified (GENERIC EQUIVALENT) 100 MG capsule Take 1 capsule (100 mg) by mouth 2 times daily     cycloSPORINE modified (GENERIC EQUIVALENT) 25 MG capsule Take 1 capsule (25 mg) by mouth 2 times daily     fluticasone (FLONASE) 50 MCG/ACT spray      insulin aspart (NOVOLOG PEN) 100 UNIT/ML injection INJECT 8UNITS BEFORE BREAKFAST AND LUNCH AND 8  UNITS WITH SUPPER     insulin glargine (LANTUS SOLOSTAR) 100 UNIT/ML pen Inject 15 Units Subcutaneous     insulin pen needle (B-D U/F) 31G X 8 MM USE TO INJECT WITH INSULIN PEN DEVICE     lisinopril (ZESTRIL) 40 MG tablet Take 40 mg by mouth daily     METOPROLOL SUCCINATE ER PO Take 100 mg by mouth daily XL     SODIUM BICARBONATE PO Take 0.5 teaspoonful by mouth daily      vitamin D2 (ERGOCALCIFEROL) 86545 units (1250 mcg) capsule Take 50,000 Units by mouth once a week     vitamin D3 (CHOLECALCIFEROL) 50 mcg (2000 units) tablet Take 1 tablet by mouth daily     No current facility-administered medications for this visit.     There are no discontinued medications.    Physical Exam  Vital Signs: There were no vitals taken for this visit.    GENERAL: Healthy, alert and no distress  EYES: Eyes grossly normal to inspection.  No discharge or erythema, or obvious scleral/conjunctival abnormalities.  RESP: No audible wheeze, cough, or visible cyanosis.  No visible retractions or increased work of breathing.    SKIN: Visible skin clear. No significant rash, abnormal pigmentation or lesions.  NEURO: Cranial nerves grossly intact.  Mentation and speech appropriate for age.  PSYCH: Mentation appears normal, affect normal/bright, judgement and insight intact, normal speech and appearance well-groomed.      Data        Latest Ref Rng & Units 7/30/2023     9:09 AM 3/28/2023     7:54 AM 12/3/2022     9:33 AM   Renal   Sodium 136 - 145 mmol/L 136  136  140    K 3.4 - 5.3 mmol/L 5.2  5.2  5.2    Cl 98 - 107 mmol/L 107  112  111    Cl (external) 98 - 107 mmol/L 107  112  111    CO2 22 - 29 mmol/L 18  19  24    Urea Nitrogen 7 - 30 mg/dL  46  41    Urea Nitrogen 8.0 - 23.0 mg/dL 31.1      Creatinine 0.51 - 1.17 mg/dL 1.54  1.73  1.64    Glucose 70 - 99 mg/dL 103  156  166    Calcium 8.8 - 10.2 mg/dL 9.3  9.2  9.0             Latest Ref Rng & Units 7/30/2023     9:09 AM 3/28/2023     7:54 AM 12/3/2022     9:33 AM   Heme   WBC 4.0 - 11.0 10e3/uL 5.8  4.8  6.6    Hgb 11.7 - 17.7 g/dL 11.5  12.0  11.9    Plt 150 - 450 10e3/uL 281  251  303             Latest Ref Rng & Units 2/26/2020     7:51 AM 12/12/2019     7:47 AM 5/9/2019     7:38 AM   Pancreas   Amylase (external) 25 - 125 IU/L 85     79     90       Lipase (external) 25 - 125 IU/L 85     79     90           This result is from an external source.          Latest Ref Rng & Units 5/9/2018     7:42 AM   Iron studies   Iron (external) 8.0 - 78.0 IU/L 16.1           This result is from an external source.         Latest Ref Rng & Units 9/19/2018     8:05 AM 5/5/2017     7:59 AM 10/7/2016     8:03 AM   [facility]   BK Quant Result Ext None detected None detected     None Detected     BK Quant Rapid Ext None detected   None detected           This result is from an external source.        Recent Labs   Lab Test 10/28/15  0759 10/07/16  0800   DOSTAC 10/27/15  2015 1945 10/6/16   TACROL <3.0  Tacrolimus Reference Range   Kidney Transplant   Pediatric                      ug/L     0-3 months post transplant   10-12     3-6 months post transplant   8-10     6-12 months post transplant  6-8     >12 months post transplant   4-7   Adult     0-6 months post transplant   8-10     6-12 months post transplant  6-8     >12 months post transplant   4-6     >5 years post transplant     3-5   Heart Transplant   Pediatric     0-12 months post transplant  10-15     >12 months post transplant   5-10   Adult     0-3 months post transplant   10-15     3-6 months post transplant   8-12     6-12 months post transplant  6-12     >12 months post transplant   6-10   Lung Transplant     0-12 months post transplant  10-15     >12 months post transplant   8-12   Liver Transplant   Pediatric     0-3 months post transplant   10-15     3-6 months post transplant   8-10     >6 months post transplant    6-8   Adult     0-3 months post transplant   10-12     3-6 months post transplant   8-10     >6  months post transplant    6-8   Pancreas Transplant     0-6 months post transplant   8-10     >6 months post transplant    5-8   This test was developed and its performance characteristics determined by the   [facility],  Special Chemistry Laboratory. It has   not been cleared or approved by the FDA. The laboratory is regulated under CLIA   as qualified to perform  high-complexity testing. This test is used for clinical   purposes. It should not be regarded as investigational or for research.  * <3.0  Tacrolimus Reference Range   Kidney Transplant   Pediatric                      ug/L     0-3 months post transplant   10-12     3-6 months post transplant   8-10     6-12 months post transplant  6-8     >12 months post transplant   4-7   Adult     0-6 months post transplant   8-10     6-12 months post transplant  6-8     >12 months post transplant   4-6     >5 years post transplant     3-5   Heart Transplant   Pediatric     0-12 months post transplant  10-15     >12 months post transplant   5-10   Adult     0-3 months post transplant   10-15     3-6 months post transplant   8-12     6-12 months post transplant  6-12     >12 months post transplant   6-10   Lung Transplant     0-12 months post transplant  10-15     >12 months post transplant   8-12   Liver Transplant   Pediatric     0-3 months post transplant   10-15     3-6 months post transplant   8-10     >6 months post transplant    6-8   Adult     0-3 months post transplant   10-12     3-6 months post transplant   8-10     >6  months post transplant    6-8   Pancreas Transplant     0-6 months post transplant   8-10     >6 months post transplant    5-8   This test was developed and its performance characteristics determined by the   [facility],  Special Chemistry Laboratory. It has   not been cleared or approved by the FDA. The laboratory is regulated under CLIA   as qualified to perform high-complexity testing. This test is used for clinical   purposes. It should not be regarded as investigational or for research.  *     I spent a total of 61 minutes on the date of the encounter doing chart review, performing a history and physical exam, completing documentation and any further activities as noted above.        Virtual Visit Details    Type of service:  Video Visit     Originating Location (pt.  Location): Home    Distant Location (provider location):  On-site  Platform used for Video Visit: Nany        Again, thank you for allowing me to participate in the care of your patient.        Sincerely,        Teri Stearns MD

## 2023-08-02 NOTE — PROGRESS NOTES
TRANSPLANT NEPHROLOGY CHRONIC POST TRANSPLANT VISIT    Assessment & Plan      # DDKT (SPK): Stable kidney function, failed pancreas               - Baseline Creatinine:  ~ 1.3-1.6              - Proteinuria: Mild (0.5-1.0 grams)              - Date DSA Last Checked: Not Known      Latest DSA: Not checked recently due to time from transplant              - BK Viremia: Not checked recently due to time from transplant              - Kidney Tx Biopsy: No     # Immunosuppression: Cyclosporine (goal ) and Azathioprine (dose 125 mg mg daily)          - Continue with intensive monitoring of immunosuppression for efficacy and toxicity.          - Changes: yes reduce CSA goal to 50-75 if next level>75 can reduce dose to 75 mg po bid , will consider AZA to mTOR switch given recurrent skin Ca, will avoid MMF  switch given new onset diarrhea. after the diarrhea w/up complete but can look into coverage for everolimus 0.75 mg po bid /sirolimus 1mg po qd, check UPC,  LFT, lipid panel with next labs. Will  further about side effects next visit   - Notes: previously on prednisone then stopped years ago     # Infection Prophylaxis:   - PJP: None     # Hypertension: acceptable control;             Goal BP: < 130/80              - Changes: not at this time, continue to monitor home BP readings and f/up PCP, no longer taking chlorthalidone     # Chronic diarrhea:   - x 6 months, possible weight loss-intentional, no abdominal pain, night sweats   - stool studies GENARO and CMV pcr, EBV pcr   - colonoscopy +biopsies and cmv stain    # Diabetes, s/p Failed Pancreas Tx (SPK): Borderline control (HbA1c 7-9%)      Last HbA1c: 7.2%              - Management as per primary care.     # Anemia in Chronic Renal Disease: Hgb: Stable, near normal      MONICA: No              - Iron studies: Not checked recently     # Mineral Bone Disorder:   - Secondary renal hyperparathyroidism; PTH level: Not checked recently        On treatment: None  -  Vitamin D; level: Low        On supplement: Yes  - Calcium; level: Normal        On supplement: No     # Electrolytes:   - Potassium; level: Normal        On supplement: No  - Bicarbonate; level: low       On supplement: Yes, increase baking soda 1 tsp       # Skin Cancer: New lesions:every 3 months, SCC and BCC requiring Mohs    - discussed IS changes. MMF not an option now given new onset diarrhea, would consider AZA to mTOR switch after the diarrhea w/up complete but  can look into  coverage for everolimus 0.75 mg po bid /sirolimus 1mg po qd, check UPC, LFT, lipid panel with next labs. Will  further about  side effects next visit              - Discussed sun protection and recommend regular follow up with Dermatology.                # Transplant History:  Etiology of Kidney Failure: Diabetes mellitus type 1  Tx: SPK  Transplant: 7/9/1989 (Kidney / Pancreas)  Significant changes in immunosuppression: None    Transplant Office Phone Number: [phone]    Assessment and plan was discussed with the patient and he voiced his understanding and agreement.    Return visit: 4 months    Teri Stearns MD    Chief Complaint   Mr. Vaughan is a 68 year old here for kidney transplant, pancreas transplant and immunosuppression management.    History of Present Illness     Last visit with  4/2022    Feels good overall  New onset diarrhea x 6 months no abdominal pain, No fevers chills, night sweats. No recent colonoscopy for years and not on MMF   Very active and walks his dog for ~ miles regularly  One right ankle swelling for a while after an injury   Excellent DM control PkT0N-9.1%, on insulin  No recent illness or hospitalization.     Current IS /100   Home BP: 120/60, 53    Ca screen: due for colonoscopy , dermatology visit    Problem List   Patient Active Problem List   Diagnosis     Diabetes mellitus type 1 (H)     Kidney replaced by transplant     Pancreas replaced by transplant (H)      Skin cancer     HTN, kidney transplant related     Aftercare following organ transplant     Chronic kidney disease, stage 3 (H)     Immunosuppression (H)     Vitamin D deficiency     Anemia in chronic renal disease     Secondary hyperparathyroidism (H)     Cellulitis of left ear     Cellulitis of right lower extremity       Allergies   No Known Allergies    Medications   Current Outpatient Medications   Medication Sig     amLODIPine (NORVASC) 5 MG tablet Take 1 tablet (5 mg) by mouth daily     azaTHIOprine (IMURAN) 50 MG tablet Take 3 tablets (150 mg) by mouth daily     blood glucose monitoring (NO BRAND SPECIFIED) test strip TEST STRIPS COVERED BY PT INS. 250.00 IDDM TYPE II - TEST 3 TIMES/DAY.     Blood Glucose Monitoring Suppl (GLUCAttainiaM BLOOD GLUCOSE MONITOR) RAIN Dispense glucose meter, test strips and lancets covered by the patient insurance. Test 3 times per day.     chlorthalidone (HYGROTON) 25 MG tablet Take 0.5 tablets (12.5 mg) by mouth daily     cycloSPORINE modified (GENERIC EQUIVALENT) 100 MG capsule Take 1 capsule (100 mg) by mouth 2 times daily     cycloSPORINE modified (GENERIC EQUIVALENT) 25 MG capsule Take 1 capsule (25 mg) by mouth 2 times daily     fluticasone (FLONASE) 50 MCG/ACT spray      insulin aspart (NOVOLOG PEN) 100 UNIT/ML injection INJECT 8UNITS BEFORE BREAKFAST AND LUNCH AND 8  UNITS WITH SUPPER     insulin glargine (LANTUS SOLOSTAR) 100 UNIT/ML pen Inject 15 Units Subcutaneous     insulin pen needle (B-D U/F) 31G X 8 MM USE TO INJECT WITH INSULIN PEN DEVICE     lisinopril (ZESTRIL) 40 MG tablet Take 40 mg by mouth daily     METOPROLOL SUCCINATE ER PO Take 100 mg by mouth daily XL     SODIUM BICARBONATE PO Take 0.5 teaspoonful by mouth daily     vitamin D2 (ERGOCALCIFEROL) 19677 units (1250 mcg) capsule Take 50,000 Units by mouth once a week     vitamin D3 (CHOLECALCIFEROL) 50 mcg (2000 units) tablet Take 1 tablet by mouth daily     No current facility-administered medications for this  visit.     There are no discontinued medications.    Physical Exam   Vital Signs: There were no vitals taken for this visit.    GENERAL: Healthy, alert and no distress  EYES: Eyes grossly normal to inspection.  No discharge or erythema, or obvious scleral/conjunctival abnormalities.  RESP: No audible wheeze, cough, or visible cyanosis.  No visible retractions or increased work of breathing.    SKIN: Visible skin clear. No significant rash, abnormal pigmentation or lesions.  NEURO: Cranial nerves grossly intact.  Mentation and speech appropriate for age.  PSYCH: Mentation appears normal, affect normal/bright, judgement and insight intact, normal speech and appearance well-groomed.      Data         Latest Ref Rng & Units 7/30/2023     9:09 AM 3/28/2023     7:54 AM 12/3/2022     9:33 AM   Renal   Sodium 136 - 145 mmol/L 136  136  140    K 3.4 - 5.3 mmol/L 5.2  5.2  5.2    Cl 98 - 107 mmol/L 107  112  111    Cl (external) 98 - 107 mmol/L 107  112  111    CO2 22 - 29 mmol/L 18  19  24    Urea Nitrogen 7 - 30 mg/dL  46  41    Urea Nitrogen 8.0 - 23.0 mg/dL 31.1      Creatinine 0.51 - 1.17 mg/dL 1.54  1.73  1.64    Glucose 70 - 99 mg/dL 103  156  166    Calcium 8.8 - 10.2 mg/dL 9.3  9.2  9.0             Latest Ref Rng & Units 7/30/2023     9:09 AM 3/28/2023     7:54 AM 12/3/2022     9:33 AM   Heme   WBC 4.0 - 11.0 10e3/uL 5.8  4.8  6.6    Hgb 11.7 - 17.7 g/dL 11.5  12.0  11.9    Plt 150 - 450 10e3/uL 281  251  303             Latest Ref Rng & Units 2/26/2020     7:51 AM 12/12/2019     7:47 AM 5/9/2019     7:38 AM   Pancreas   Amylase (external) 25 - 125 IU/L 85     79     90       Lipase (external) 25 - 125 IU/L 85     79     90           This result is from an external source.         Latest Ref Rng & Units 5/9/2018     7:42 AM   Iron studies   Iron (external) 8.0 - 78.0 IU/L 16.1           This result is from an external source.         Latest Ref Rng & Units 9/19/2018     8:05 AM 5/5/2017     7:59 AM 10/7/2016     8:03  AM   [facility]   BK Quant Result Ext None detected None detected     None Detected     BK Quant Rapid Ext None detected   None detected           This result is from an external source.        Recent Labs   Lab Test 10/28/15  0759 10/07/16  0800   DOSTAC 10/27/15  2015 1945 10/6/16   TACROL <3.0  Tacrolimus Reference Range   Kidney Transplant   Pediatric                      ug/L     0-3 months post transplant   10-12     3-6 months post transplant   8-10     6-12 months post transplant  6-8     >12 months post transplant   4-7   Adult     0-6 months post transplant   8-10     6-12 months post transplant  6-8     >12 months post transplant   4-6     >5 years post transplant     3-5   Heart Transplant   Pediatric     0-12 months post transplant  10-15     >12 months post transplant   5-10   Adult     0-3 months post transplant   10-15     3-6 months post transplant   8-12     6-12 months post transplant  6-12     >12 months post transplant   6-10   Lung Transplant     0-12 months post transplant  10-15     >12 months post transplant   8-12   Liver Transplant   Pediatric     0-3 months post transplant   10-15     3-6 months post transplant   8-10     >6 months post transplant    6-8   Adult     0-3 months post transplant   10-12     3-6 months post transplant   8-10     >6  months post transplant    6-8   Pancreas Transplant     0-6 months post transplant   8-10     >6 months post transplant    5-8   This test was developed and its performance characteristics determined by the   [facility],  Special Chemistry Laboratory. It has   not been cleared or approved by the FDA. The laboratory is regulated under CLIA   as qualified to perform high-complexity testing. This test is used for clinical   purposes. It should not be regarded as investigational or for research.  * <3.0  Tacrolimus Reference Range   Kidney Transplant   Pediatric                      ug/L     0-3 months post transplant    10-12     3-6 months post transplant   8-10     6-12 months post transplant  6-8     >12 months post transplant   4-7   Adult     0-6 months post transplant   8-10     6-12 months post transplant  6-8     >12 months post transplant   4-6     >5 years post transplant     3-5   Heart Transplant   Pediatric     0-12 months post transplant  10-15     >12 months post transplant   5-10   Adult     0-3 months post transplant   10-15     3-6 months post transplant   8-12     6-12 months post transplant  6-12     >12 months post transplant   6-10   Lung Transplant     0-12 months post transplant  10-15     >12 months post transplant   8-12   Liver Transplant   Pediatric     0-3 months post transplant   10-15     3-6 months post transplant   8-10     >6 months post transplant    6-8   Adult     0-3 months post transplant   10-12     3-6 months post transplant   8-10     >6  months post transplant    6-8   Pancreas Transplant     0-6 months post transplant   8-10     >6 months post transplant    5-8   This test was developed and its performance characteristics determined by the   [facility],  Special Chemistry Laboratory. It has   not been cleared or approved by the FDA. The laboratory is regulated under CLIA   as qualified to perform high-complexity testing. This test is used for clinical   purposes. It should not be regarded as investigational or for research.  *     I spent a total of 61 minutes on the date of the encounter doing chart review, performing a history and physical exam, completing documentation and any further activities as noted above.

## 2023-08-02 NOTE — NURSING NOTE
Is the patient currently in the state of [place]? YES    Visit mode:VIDEO    If the visit is dropped, the patient can be reconnected by: VIDEO VISIT: Text to cell phone: [phone]    Will anyone else be joining the visit? NO      How would you like to obtain your AVS? MyChart    Are changes needed to the allergy or medication list? NO    Reason for visit: RECHECK

## 2023-08-02 NOTE — PATIENT INSTRUCTIONS
Increase baking soda to 1 teaspoon daily    Stool studies    Schedule colonoscopy    Your cyclosporin goal will be reduced to 50-75    We will check coverage for alternative anti-rejection meds that could help reduce skin cancer frequency. We can discuss this further after completing the workup for diarrhea    Transplant Patient Information  Your Post Transplant Coordinator is: Jerica Ramirez  You and your care team can contact your transplant coordinator Monday - Friday, 8am - 5pm at [phone] (Option 2 to reach the coordinator or Option 4 to schedule an appointment).  You can also reach your care team online via [employer].  After hours for urgent matters, please call [facility] at [phone].

## 2023-08-04 ENCOUNTER — TELEPHONE (OUTPATIENT)
Dept: TRANSPLANT | Facility: CLINIC | Age: 68
End: 2023-08-04
Payer: COMMERCIAL

## 2023-08-04 NOTE — TELEPHONE ENCOUNTER
Teri Guadalupe MD Sveiven, Sara, RN      . Chronic diarrhea x months    . Frequent skin Ca    - increased baking soda to 1 tsp daily    - reduce CSA goal to 50-75 if next level>75 can reduce dose to 75 mg po bid    - added stool studies, cmv pcr, ebv pcr    - colonoscopy with biopsies and Cmv stain through his pcp    - will consider AZA to mTOR switch after the diarrhea w/up complete but can look into coverage for everolimus 0.75 mg po bid /sirolimus 1mg po qd, check UPC, LFT, lipid panel with next labs    Thanks    OUTCOME: confirmed labs have been ordered.  Left message for patient with reminder to obtain labs including stool studies.      Jerica Ramirez RN   Transplant Coordinator  [phone]

## 2023-09-09 ENCOUNTER — HEALTH MAINTENANCE LETTER (OUTPATIENT)
Age: 68
End: 2023-09-09

## 2023-12-29 ENCOUNTER — LAB (OUTPATIENT)
Dept: LAB | Facility: CLINIC | Age: 68
End: 2023-12-29
Payer: COMMERCIAL

## 2023-12-29 DIAGNOSIS — Z94.0 KIDNEY REPLACED BY TRANSPLANT: ICD-10-CM

## 2023-12-29 LAB — VIT D+METAB SERPL-MCNC: 40 NG/ML (ref 20–50)

## 2023-12-29 PROCEDURE — 36415 COLL VENOUS BLD VENIPUNCTURE: CPT

## 2023-12-29 PROCEDURE — 82306 VITAMIN D 25 HYDROXY: CPT

## 2023-12-29 PROCEDURE — 87799 DETECT AGENT NOS DNA QUANT: CPT

## 2023-12-30 LAB — CMV DNA SPEC NAA+PROBE-ACNC: NOT DETECTED IU/ML

## 2024-01-03 LAB
EBV DNA COPIES/ML, INSTRUMENT: ABNORMAL COPIES/ML
EBV DNA SPEC NAA+PROBE-LOG#: 4.9 {LOG_COPIES}/ML

## 2024-01-04 ENCOUNTER — TELEPHONE (OUTPATIENT)
Dept: TRANSPLANT | Facility: CLINIC | Age: 69
End: 2024-01-04
Payer: COMMERCIAL

## 2024-01-04 DIAGNOSIS — Z94.83 PANCREAS REPLACED BY TRANSPLANT (H): ICD-10-CM

## 2024-01-04 DIAGNOSIS — Z94.0 KIDNEY REPLACED BY TRANSPLANT: ICD-10-CM

## 2024-01-04 NOTE — TELEPHONE ENCOUNTER
Teri Guadalupe MD Sveiven, Sara, RN  - Current CSA goal ~50, overdue for repeat  - check if Symptoms?  - Repeat EBV pcr in 2 week, add LDH to labs  - consider imaging CT c/a/p if completed colonoscopy for diarrhea evaluation and w/up is negative  - checked everolimus 0.75 mg po bid coverage as mentioned in last visit in Aug?UPC, LFT, lipid panel?    OUTCOME:  Patient states he does have some intermittent fatgiue, denies other symptoms of EBV.    Has not had colonoscopy done,  encouraged to get scheduled.  States diarrhea is intermittent    Will have labs done next week.    Jerica Ramirez RN   Transplant Coordinator  [phone]

## 2024-01-12 RX ORDER — CYCLOSPORINE 25 MG/1
75 CAPSULE, LIQUID FILLED ORAL 2 TIMES DAILY
Qty: 180 CAPSULE | Refills: 11 | Status: SHIPPED | OUTPATIENT
Start: 2024-01-12 | End: 2024-05-15

## 2024-03-21 ENCOUNTER — TELEPHONE (OUTPATIENT)
Dept: TRANSPLANT | Facility: CLINIC | Age: 69
End: 2024-03-21
Payer: COMMERCIAL

## 2024-03-21 NOTE — LETTER
Mathew Vaughan  [address]  [place] [address]                April 4, 2024        Mathew Vaughan  [address]  [facility] [address]          Dear Mathew Vaughan,     We hope this letter finds you well. We at the [facility] have been trying to reach you via phone, however we have been unsuccessful. Please contact us at your earliest convenience at [phone]. Thank you.           Sincerely,     Your Transplant Team

## 2024-03-22 NOTE — TELEPHONE ENCOUNTER
ISSUE: due for repeat EBV    Rashracheal Martinez MD  1/4/2024  2:39 PM CST Back to Top      - Current CSA goal ~50, overdue for repeat  - check if Symptoms?  - Repeat EBV pcr in 2 week, add LDH to labs  - consider imaging CT c/a/p if completed colonoscopy for diarrhea evaluation and w/up is negative  - checked everolimus 0.75 mg po bid coverage as mentioned in last visit in Aug?UPC, LFT, lipid panel?       Neris Galo Sara, RN  Hello,  Everolimus 0.75 60/30 days= $12          Previous Messages       ----- Message -----  From: Jerica Ramirez RN  Sent: 1/12/2024  10:35 AM CST  To: Neris Galo  Subject: everolimus coverage                              Meliton Cordon!    Could you check coverage for me for everolimus 0.75 mg bid?    No rush.  Thank you!    Jerica Ramirez RN  Transplant Coordinator  [phone]    OUTCOME:  Left message X2,  my chart message sent as well as trying to reach you letter sent via mail.    Jerica Ramirez RN   Transplant Coordinator  [phone]

## 2024-04-06 ENCOUNTER — HEALTH MAINTENANCE LETTER (OUTPATIENT)
Age: 69
End: 2024-04-06

## 2024-05-12 ENCOUNTER — LAB (OUTPATIENT)
Dept: LAB | Facility: CLINIC | Age: 69
End: 2024-05-12
Payer: COMMERCIAL

## 2024-05-12 DIAGNOSIS — Z94.0 KIDNEY REPLACED BY TRANSPLANT: ICD-10-CM

## 2024-05-12 DIAGNOSIS — Z94.83 PANCREAS REPLACED BY TRANSPLANT (H): ICD-10-CM

## 2024-05-12 LAB
ANION GAP SERPL CALCULATED.3IONS-SCNC: 11 MMOL/L (ref 7–15)
BUN SERPL-MCNC: 42.6 MG/DL (ref 8–23)
CALCIUM SERPL-MCNC: 9.6 MG/DL (ref 8.8–10.2)
CHLORIDE SERPL-SCNC: 108 MMOL/L (ref 98–107)
CREAT SERPL-MCNC: 1.76 MG/DL (ref 0.51–1.17)
DEPRECATED HCO3 PLAS-SCNC: 15 MMOL/L (ref 22–29)
EGFRCR SERPLBLD CKD-EPI 2021: 41 ML/MIN/1.73M2
ERYTHROCYTE [DISTWIDTH] IN BLOOD BY AUTOMATED COUNT: 13.7 % (ref 10–15)
GLUCOSE SERPL-MCNC: 125 MG/DL (ref 70–99)
HCT VFR BLD AUTO: 34.7 % (ref 35–53)
HGB BLD-MCNC: 12.1 G/DL (ref 11.7–17.7)
LDH SERPL L TO P-CCNC: 169 U/L (ref 0–250)
MCH RBC QN AUTO: 35.9 PG (ref 26.5–33)
MCHC RBC AUTO-ENTMCNC: 34.9 G/DL (ref 31.5–36.5)
MCV RBC AUTO: 103 FL (ref 78–100)
PLATELET # BLD AUTO: 253 10E3/UL (ref 150–450)
POTASSIUM SERPL-SCNC: 5.6 MMOL/L (ref 3.4–5.3)
RBC # BLD AUTO: 3.37 10E6/UL (ref 3.8–5.9)
SODIUM SERPL-SCNC: 134 MMOL/L (ref 135–145)
WBC # BLD AUTO: 5.1 10E3/UL (ref 4–11)

## 2024-05-12 PROCEDURE — 83615 LACTATE (LD) (LDH) ENZYME: CPT

## 2024-05-12 PROCEDURE — 36415 COLL VENOUS BLD VENIPUNCTURE: CPT

## 2024-05-12 PROCEDURE — 85027 COMPLETE CBC AUTOMATED: CPT

## 2024-05-12 PROCEDURE — 80158 DRUG ASSAY CYCLOSPORINE: CPT

## 2024-05-12 PROCEDURE — 87799 DETECT AGENT NOS DNA QUANT: CPT

## 2024-05-12 PROCEDURE — 80048 BASIC METABOLIC PNL TOTAL CA: CPT

## 2024-05-13 ENCOUNTER — TELEPHONE (OUTPATIENT)
Dept: TRANSPLANT | Facility: CLINIC | Age: 69
End: 2024-05-13
Payer: COMMERCIAL

## 2024-05-13 DIAGNOSIS — Z94.83 PANCREAS REPLACED BY TRANSPLANT (H): ICD-10-CM

## 2024-05-13 DIAGNOSIS — Z94.0 KIDNEY REPLACED BY TRANSPLANT: Primary | ICD-10-CM

## 2024-05-13 LAB
CYCLOSPORINE BLD LC/MS/MS-MCNC: 67 UG/L (ref 50–400)
EBV DNA SERPL NAA+PROBE-ACNC: NOT DETECTED IU/ML
TME LAST DOSE: NORMAL H
TME LAST DOSE: NORMAL H

## 2024-05-14 NOTE — TELEPHONE ENCOUNTER
Cyclosporine  level 67 on 5/12/2024.  Goal .   Current dose 75 mg in AM, 75 mg in PM    Dose changed to 100 mg in AM, 75 mg in PM   Recheck level in 2 weeks    Call and assess hydration status.  How much water is he drinking per day? ~60 oz daily  Any recent illness, diarrhea, s/s of a UTI, or medication changes? denies  Any increased intake of alcohol or caffeine? States he is having 6 oz X 5 cups of coffee  Recommend increasing hydration and repeating labs within 1 week.    Discussed his elevated K+ of 5.6,  reviewed low K+ diet and dietary K+ information sent via my chart     Jerica Ramirez RN   Transplant Coordinator  [phone]

## 2024-05-15 RX ORDER — CYCLOSPORINE 25 MG/1
CAPSULE, LIQUID FILLED ORAL
Qty: 210 CAPSULE | Refills: 11 | Status: SHIPPED | OUTPATIENT
Start: 2024-05-15

## 2024-06-15 ENCOUNTER — HEALTH MAINTENANCE LETTER (OUTPATIENT)
Age: 69
End: 2024-06-15

## 2024-08-27 DIAGNOSIS — Z94.0 KIDNEY REPLACED BY TRANSPLANT: ICD-10-CM

## 2024-08-27 DIAGNOSIS — Z94.83 PANCREAS REPLACED BY TRANSPLANT (H): ICD-10-CM

## 2024-08-28 RX ORDER — AZATHIOPRINE 50 MG/1
125 TABLET ORAL DAILY
Qty: 270 TABLET | Refills: 3 | Status: SHIPPED | OUTPATIENT
Start: 2024-08-28 | End: 2024-09-09 | Stop reason: ALTCHOICE

## 2024-09-02 NOTE — PROGRESS NOTES
TRANSPLANT NEPHROLOGY CHRONIC POST TRANSPLANT VISIT    Assessment & Plan      Recommendation:  We will change AZA to MPA. In the future, if tolerating MPA, we will consider changing CsA to mTOR or prednisone.  Discuss with PCP regarding Tamsulosin (he prefers to wait to see PCP or urologist before starting medication).  Recommended PCP evaluation for new leg discomfort, especially investigating for PVD.  Discuss with your PCP about starting a statin.  See a dermatologist this year.  Flu and covid vaccine this fall.   Schedule a colonoscopy or other form of colon cancer screening.    # DDKT (SPK): Stable kidney function, failed pancreas               - Baseline Creatinine:  ~ 1.3-1.6              - Proteinuria: Mild (0.5-1 grams)              - Date DSA Last Checked: Not Known      Latest DSA: Not checked recently due to time from transplant              - BK Viremia: Not checked recently due to time from transplant              - Kidney Tx Biopsy: No     # Immunosuppression: Cyclosporine (goal 50-75) and Azathioprine (dose 125 mg daily).  - Continue with intensive monitoring of immunosuppression for efficacy and toxicity.  - Changes: Switch AZA to  mg bid..  - Notes: Previously on prednisone then stopped years ago  - CsA and mTOR perhaps not best combination (in addition to proteinuria and eGFR ~ 40). Since diarrhea resolved, we can dry low-moderate dose MPA. If tolerating, then perhaps after, we can switch CsA to mTOR.     # Infection Prophylaxis:   - PJP: None    # EBV viremia: improved (although different essay) after dose reduction of CsA and AZA.   - Repeat EBV PCR.     # Hypertension: Acceptable control;             Goal BP: < 130/80.              - Changes: not at this time, continue to monitor home BP readings and f/up PCP, no longer taking chlorthalidone     # Chronic diarrhea: reports diarrhea has resolved. EBV viral load not detectable in May, 2024, after reduction in CsA goal and AZA dose.    #  Diabetes, s/p Failed Pancreas Tx (SPK): Controlled (HbA1c <7%)      Last HbA1c: 6.7% (12/2023).              - Management as per primary care.     # Anemia in Chronic Renal Disease: Hgb: Stable, near normal      MONICA: No              - Iron studies: Not checked recently     # Mineral Bone Disorder:   - Secondary renal hyperparathyroidism; PTH level: Not checked recently        On treatment: None  - Vitamin D; level: Low        On supplement: Yes  - Calcium; level: Normal        On supplement: No     # Electrolytes:   - Potassium; level: Normal        On supplement: No  - Bicarbonate; level: low       On supplement: Yes, baking soda 1 tsp     # Skin Cancer: New lesions:every 3 months, SCC and BCC requiring Mohs.   - Discussed IS changes. CsA and mTOR perhaps not best combination (in addition to proteinuria and eGFR ~ 40). Since diarrhea resolved, we can dry low-moderate dose MPA. If tolerating, then perhaps after, we can switch CsA to mTOR.              - Discussed sun protection and recommend regular follow up with Dermatology.    # LUTS: I offered starting Tamsulosin, but he wants to discuss with PCP in an upcoming appointment.                 # Transplant History:  Etiology of Kidney Failure: Diabetes mellitus type 1  Tx: SPK  Transplant: 7/9/1989 (Kidney / Pancreas)  Significant changes in immunosuppression: None    Transplant Office Phone Number: [phone]    Assessment and plan was discussed with the patient and he voiced his understanding and agreement.    Return visit: 6 months.    Sam Roman MD    Chief Complaint   Mr. Vaughan is a 69 year old here for kidney transplant, pancreas transplant and immunosuppression management.    History of Present Illness    Mr. Vaughan reports feeling good overall.    Since last clinic visit:   Hospitalizations: No   New Medical Issues: Yes, he continues to have skin lesions about every quarter. A lesion in the leg was removed a few months ago and he now has a  lesion in his right forearm.     He continues to remain active, walking the dog 30-60 minutes once or twice a day. He does report a new bilateral leg calf and foot numbness that developed three quarters into the walk. It improves on rest, but recurs after walking. This happens almost every day. No new skin breaks or ulcers in his foot. No skin color changes.    Glucose usually ranges between 120-140 on insulin glargine 15 units every day and ISS.    Chest pain or shortness of breath: No  Lower extremity swelling: No  Weight change: No  Nausea and vomiting: No  Diarrhea: No.  Heartburn symptoms: No  Fever, sweats or chills: No  Urinary complaints: Yes, dribbling and slow stream.    Home BP:  145/70  a month ago 130/65 mm Hg.    Problem List   Patient Active Problem List   Diagnosis    Diabetes mellitus type 1 (H)    Kidney replaced by transplant    Pancreas replaced by transplant (H)    Skin cancer    HTN, kidney transplant related    Aftercare following organ transplant    Chronic kidney disease, stage 3 (H)    Immunosuppression (H24)    Vitamin D deficiency    Anemia in chronic renal disease    Secondary hyperparathyroidism (H24)    Cellulitis of left ear    Cellulitis of right lower extremity       Allergies   No Known Allergies    Medications   Current Outpatient Medications   Medication Sig Dispense Refill    amLODIPine (NORVASC) 5 MG tablet Take 1 tablet (5 mg) by mouth daily 60 tablet 11    azaTHIOprine (IMURAN) 50 MG tablet Take 2.5 tablets (125 mg) by mouth daily. 270 tablet 3    blood glucose monitoring (NO BRAND SPECIFIED) test strip TEST STRIPS COVERED BY PT INS. 250.00 IDDM TYPE II - TEST 3 TIMES/DAY.      Blood Glucose Monitoring Suppl (GLUC[employer] BLOOD GLUCOSE MONITOR) RAIN Dispense glucose meter, test strips and lancets covered by the patient insurance. Test 3 times per day.      cycloSPORINE modified (GENERIC EQUIVALENT) 25 MG capsule Take 4 capsules (100 mg) by mouth every morning AND 3 capsules (75  mg) every evening. 210 capsule 11    fluticasone (FLONASE) 50 MCG/ACT spray       insulin aspart (NOVOLOG PEN) 100 UNIT/ML injection INJECT 8UNITS BEFORE BREAKFAST AND LUNCH AND 8  UNITS WITH SUPPER      insulin glargine (BASAGLAR KWIKPEN) 100 UNIT/ML pen Inject 100 Units Subcutaneous daily      insulin pen needle (B-D U/F) 31G X 8 MM USE TO INJECT WITH INSULIN PEN DEVICE      lisinopril (ZESTRIL) 40 MG tablet Take 40 mg by mouth daily      METOPROLOL SUCCINATE ER PO Take 100 mg by mouth daily XL      SODIUM BICARBONATE PO Take 1 teaspoonful by mouth daily      vitamin D3 (CHOLECALCIFEROL) 50 mcg (2000 units) tablet Take 1 tablet by mouth daily       No current facility-administered medications for this visit.     There are no discontinued medications.    Physical Exam   Vital Signs: There were no vitals taken for this visit.    GENERAL: Healthy, alert and no distress  EYES: Eyes grossly normal to inspection.  No discharge or erythema, or obvious scleral/conjunctival abnormalities.  RESP: No audible wheeze, cough, or visible cyanosis.  No visible retractions or increased work of breathing.    SKIN: Visible skin clear. No significant rash, abnormal pigmentation or lesions.  NEURO: Cranial nerves grossly intact.  Mentation and speech appropriate for age.  PSYCH: Mentation appears normal, affect normal/bright, judgement and insight intact, normal speech and appearance well-groomed.      Data         Latest Ref Rng & Units 5/12/2024    10:16 AM 7/30/2023     9:09 AM 3/28/2023     7:54 AM   Renal   Sodium 135 - 145 mmol/L 134  136  136    K 3.4 - 5.3 mmol/L 5.6  5.2  5.2    Cl 98 - 107 mmol/L 108  107  112    Cl (external) 98 - 107 mmol/L 108  107  112    CO2 22 - 29 mmol/L 15  18  19    Urea Nitrogen 7 - 30 mg/dL   46    Urea Nitrogen 8.0 - 23.0 mg/dL 42.6  31.1     Creatinine 0.51 - 1.17 mg/dL 1.76  1.54  1.73    Glucose 70 - 99 mg/dL 125  103  156    Calcium 8.8 - 10.2 mg/dL 9.6  9.3  9.2          Latest Ref Rng &  2 seconds or less Units 12/29/2023     9:53 AM   Bone Health   Vit D Def 20 - 50 ng/mL 40          Latest Ref Rng & Units 5/12/2024    10:16 AM 7/30/2023     9:09 AM 3/28/2023     7:54 AM   Heme   WBC 4.0 - 11.0 10e3/uL 5.1  5.8  4.8    Hgb 11.7 - 17.7 g/dL 12.1  11.5  12.0    Plt 150 - 450 10e3/uL 253  281  251             Latest Ref Rng & Units 2/26/2020     7:51 AM 12/12/2019     7:47 AM 5/9/2019     7:38 AM   Pancreas   Amylase (external) 25 - 125 IU/L 85     79     90       Lipase (external) 25 - 125 IU/L 85     79     90           This result is from an external source.         Latest Ref Rng & Units 5/9/2018     7:42 AM   Iron studies   Iron (external) 8.0 - 78.0 IU/L 16.1           This result is from an external source.         Latest Ref Rng & Units 12/29/2023     9:53 AM 9/19/2018     8:05 AM 5/5/2017     7:59 AM   [facility]   BK Quant Result Ext None detected  None detected     None Detected    EBV DNA LOG OF COPIES  4.9          This result is from an external source.

## 2024-09-03 ENCOUNTER — VIRTUAL VISIT (OUTPATIENT)
Dept: TRANSPLANT | Facility: CLINIC | Age: 69
End: 2024-09-03
Attending: INTERNAL MEDICINE
Payer: COMMERCIAL

## 2024-09-03 DIAGNOSIS — Z94.0 KIDNEY REPLACED BY TRANSPLANT: ICD-10-CM

## 2024-09-03 DIAGNOSIS — B27.00 EBV (EPSTEIN-BARR VIRUS) VIREMIA: ICD-10-CM

## 2024-09-03 DIAGNOSIS — Z94.83 PANCREAS REPLACED BY TRANSPLANT (H): ICD-10-CM

## 2024-09-03 DIAGNOSIS — D84.9 IMMUNOSUPPRESSION (H): Primary | ICD-10-CM

## 2024-09-03 PROCEDURE — 99214 OFFICE O/P EST MOD 30 MIN: CPT | Mod: 95 | Performed by: STUDENT IN AN ORGANIZED HEALTH CARE EDUCATION/TRAINING PROGRAM

## 2024-09-03 PROCEDURE — G2211 COMPLEX E/M VISIT ADD ON: HCPCS | Mod: 95 | Performed by: STUDENT IN AN ORGANIZED HEALTH CARE EDUCATION/TRAINING PROGRAM

## 2024-09-03 ASSESSMENT — PAIN SCALES - GENERAL: PAINLEVEL: MILD PAIN (2)

## 2024-09-03 NOTE — LETTER
9/3/2024      Mathew Vaughan  [address]  [place] [address]      Dear Colleague,    Thank you for referring your patient, Mathew Vaughan, to the [facility]. Please see a copy of my visit note below.    TRANSPLANT NEPHROLOGY CHRONIC POST TRANSPLANT VISIT    Assessment & Plan     Recommendation:  We will change AZA to MPA. In the future, if tolerating MPA, we will consider changing CsA to mTOR or prednisone.  Discuss with PCP regarding Tamsulosin (he prefers to wait to see PCP or urologist before starting medication).  Recommended PCP evaluation for new leg discomfort, especially investigating for PVD.  Discuss with your PCP about starting a statin.  See a dermatologist this year.  Flu and covid vaccine this fall.   Schedule a colonoscopy or other form of colon cancer screening.    # DDKT (SPK): Stable kidney function, failed pancreas               - Baseline Creatinine:  ~ 1.3-1.6              - Proteinuria: Mild (0.5-1 grams)              - Date DSA Last Checked: Not Known      Latest DSA: Not checked recently due to time from transplant              - BK Viremia: Not checked recently due to time from transplant              - Kidney Tx Biopsy: No     # Immunosuppression: Cyclosporine (goal 50-75) and Azathioprine (dose 125 mg daily).  - Continue with intensive monitoring of immunosuppression for efficacy and toxicity.  - Changes: Switch AZA to  mg bid..  - Notes: Previously on prednisone then stopped years ago  - CsA and mTOR perhaps not best combination (in addition to proteinuria and eGFR ~ 40). Since diarrhea resolved, we can dry low-moderate dose MPA. If tolerating, then perhaps after, we can switch CsA to mTOR.     # Infection Prophylaxis:   - PJP: None    # EBV viremia: improved (although different essay) after dose reduction of CsA and AZA.   - Repeat EBV PCR.     # Hypertension: Acceptable control;             Goal BP: < 130/80.              - Changes: not at  this time, continue to monitor home BP readings and f/up PCP, no longer taking chlorthalidone     # Chronic diarrhea: reports diarrhea has resolved. EBV viral load not detectable in May, 2024, after reduction in CsA goal and AZA dose.    # Diabetes, s/p Failed Pancreas Tx (SPK): Controlled (HbA1c <7%)      Last HbA1c: 6.7% (12/2023).              - Management as per primary care.     # Anemia in Chronic Renal Disease: Hgb: Stable, near normal      MONICA: No              - Iron studies: Not checked recently     # Mineral Bone Disorder:   - Secondary renal hyperparathyroidism; PTH level: Not checked recently        On treatment: None  - Vitamin D; level: Low        On supplement: Yes  - Calcium; level: Normal        On supplement: No     # Electrolytes:   - Potassium; level: Normal        On supplement: No  - Bicarbonate; level: low       On supplement: Yes, baking soda 1 tsp     # Skin Cancer: New lesions:every 3 months, SCC and BCC requiring Mohs.   - Discussed IS changes. CsA and mTOR perhaps not best combination (in addition to proteinuria and eGFR ~ 40). Since diarrhea resolved, we can dry low-moderate dose MPA. If tolerating, then perhaps after, we can switch CsA to mTOR.              - Discussed sun protection and recommend regular follow up with Dermatology.    # LUTS: I offered starting Tamsulosin, but he wants to discuss with PCP in an upcoming appointment.                 # Transplant History:  Etiology of Kidney Failure: Diabetes mellitus type 1  Tx: SPK  Transplant: 7/9/1989 (Kidney / Pancreas)  Significant changes in immunosuppression: None    Transplant Office Phone Number: [phone]    Assessment and plan was discussed with the patient and he voiced his understanding and agreement.    Return visit: 6 months.    Sam Roman MD    Chief Complaint  Mr. Vaughan is a 69 year old here for kidney transplant, pancreas transplant and immunosuppression management.    History of Present Illness     Alexus reports feeling good overall.    Since last clinic visit:   Hospitalizations: No   New Medical Issues: Yes, he continues to have skin lesions about every quarter. A lesion in the leg was removed a few months ago and he now has a lesion in his right forearm.     He continues to remain active, walking the dog 30-60 minutes once or twice a day. He does report a new bilateral leg calf and foot numbness that developed three quarters into the walk. It improves on rest, but recurs after walking. This happens almost every day. No new skin breaks or ulcers in his foot. No skin color changes.    Glucose usually ranges between 120-140 on insulin glargine 15 units every day and ISS.    Chest pain or shortness of breath: No  Lower extremity swelling: No  Weight change: No  Nausea and vomiting: No  Diarrhea: No.  Heartburn symptoms: No  Fever, sweats or chills: No  Urinary complaints: Yes, dribbling and slow stream.    Home BP:  145/70  a month ago 130/65 mm Hg.    Problem List  Patient Active Problem List   Diagnosis     Diabetes mellitus type 1 (H)     Kidney replaced by transplant     Pancreas replaced by transplant (H)     Skin cancer     HTN, kidney transplant related     Aftercare following organ transplant     Chronic kidney disease, stage 3 (H)     Immunosuppression (H24)     Vitamin D deficiency     Anemia in chronic renal disease     Secondary hyperparathyroidism (H24)     Cellulitis of left ear     Cellulitis of right lower extremity       Allergies  No Known Allergies    Medications  Current Outpatient Medications   Medication Sig Dispense Refill     amLODIPine (NORVASC) 5 MG tablet Take 1 tablet (5 mg) by mouth daily 60 tablet 11     azaTHIOprine (IMURAN) 50 MG tablet Take 2.5 tablets (125 mg) by mouth daily. 270 tablet 3     blood glucose monitoring (NO BRAND SPECIFIED) test strip TEST STRIPS COVERED BY PT INS. 250.00 IDDM TYPE II - TEST 3 TIMES/DAY.       Blood Glucose Monitoring Suppl ([employer] BLOOD  GLUCOSE MONITOR) RAIN Dispense glucose meter, test strips and lancets covered by the patient insurance. Test 3 times per day.       cycloSPORINE modified (GENERIC EQUIVALENT) 25 MG capsule Take 4 capsules (100 mg) by mouth every morning AND 3 capsules (75 mg) every evening. 210 capsule 11     fluticasone (FLONASE) 50 MCG/ACT spray        insulin aspart (NOVOLOG PEN) 100 UNIT/ML injection INJECT 8UNITS BEFORE BREAKFAST AND LUNCH AND 8  UNITS WITH SUPPER       insulin glargine (BASAGLAR KWIKPEN) 100 UNIT/ML pen Inject 100 Units Subcutaneous daily       insulin pen needle (B-D U/F) 31G X 8 MM USE TO INJECT WITH INSULIN PEN DEVICE       lisinopril (ZESTRIL) 40 MG tablet Take 40 mg by mouth daily       METOPROLOL SUCCINATE ER PO Take 100 mg by mouth daily XL       SODIUM BICARBONATE PO Take 1 teaspoonful by mouth daily       vitamin D3 (CHOLECALCIFEROL) 50 mcg (2000 units) tablet Take 1 tablet by mouth daily       No current facility-administered medications for this visit.     There are no discontinued medications.    Physical Exam  Vital Signs: There were no vitals taken for this visit.    GENERAL: Healthy, alert and no distress  EYES: Eyes grossly normal to inspection.  No discharge or erythema, or obvious scleral/conjunctival abnormalities.  RESP: No audible wheeze, cough, or visible cyanosis.  No visible retractions or increased work of breathing.    SKIN: Visible skin clear. No significant rash, abnormal pigmentation or lesions.  NEURO: Cranial nerves grossly intact.  Mentation and speech appropriate for age.  PSYCH: Mentation appears normal, affect normal/bright, judgement and insight intact, normal speech and appearance well-groomed.      Data        Latest Ref Rng & Units 5/12/2024    10:16 AM 7/30/2023     9:09 AM 3/28/2023     7:54 AM   Renal   Sodium 135 - 145 mmol/L 134  136  136    K 3.4 - 5.3 mmol/L 5.6  5.2  5.2    Cl 98 - 107 mmol/L 108  107  112    Cl (external) 98 - 107 mmol/L 108  107  112    CO2 22 -  29 mmol/L 15  18  19    Urea Nitrogen 7 - 30 mg/dL   46    Urea Nitrogen 8.0 - 23.0 mg/dL 42.6  31.1     Creatinine 0.51 - 1.17 mg/dL 1.76  1.54  1.73    Glucose 70 - 99 mg/dL 125  103  156    Calcium 8.8 - 10.2 mg/dL 9.6  9.3  9.2          Latest Ref Rng & Units 12/29/2023     9:53 AM   Bone Health   Vit D Def 20 - 50 ng/mL 40          Latest Ref Rng & Units 5/12/2024    10:16 AM 7/30/2023     9:09 AM 3/28/2023     7:54 AM   Heme   WBC 4.0 - 11.0 10e3/uL 5.1  5.8  4.8    Hgb 11.7 - 17.7 g/dL 12.1  11.5  12.0    Plt 150 - 450 10e3/uL 253  281  251             Latest Ref Rng & Units 2/26/2020     7:51 AM 12/12/2019     7:47 AM 5/9/2019     7:38 AM   Pancreas   Amylase (external) 25 - 125 IU/L 85     79     90       Lipase (external) 25 - 125 IU/L 85     79     90           This result is from an external source.         Latest Ref Rng & Units 5/9/2018     7:42 AM   Iron studies   Iron (external) 8.0 - 78.0 IU/L 16.1           This result is from an external source.         Latest Ref Rng & Units 12/29/2023     9:53 AM 9/19/2018     8:05 AM 5/5/2017     7:59 AM   [facility]   BK Quant Result Ext None detected  None detected     None Detected    EBV DNA LOG OF COPIES  4.9          This result is from an external source.     Virtual Visit Details    Type of service:  Video Visit     Originating Location (pt. Location): Home    Distant Location (provider location):  On-site  Platform used for Video Visit: Nany      Again, thank you for allowing me to participate in the care of your patient.        Sincerely,        Sam Roman MD

## 2024-09-03 NOTE — PATIENT INSTRUCTIONS
Patient Recommendations:  - Repeat labs this week.  - Based on the results we'll decide if we'll change Azathioprine to another medication to decrease the chance of having new skin lesions.  - Speak to your PCP regarding the pain in your legs. A measurement of blood flow of your legs might be needed.  - Discuss with your PCP about a cholesterol medication (statin).  - Discuss with your PCP about Tamsulosin (Flomax) and/or a Urology referral.     Transplant Patient Information  Your Post Transplant Coordinator is: Jerica Ramirez  For non urgent items, we encourage you to contact your coordinator/care team online via [email or web address]  You and your care team can also contact your transplant coordinator Monday - Friday, 8am - 5pm at [phone] (Option 2 to reach the coordinator or Option 4 to schedule an appointment).  After hours for urgent matters, please call [facility] at [phone].

## 2024-09-03 NOTE — NURSING NOTE
Current patient location: [id number]  [facility] [address]    Is the patient currently in the state of [place]? YES    Visit mode:VIDEO    If the visit is dropped, the patient can be reconnected by: VIDEO VISIT: Text to cell phone:   Telephone Information:   Mobile [phone]       Will anyone else be joining the visit? NO  (If patient encounters technical issues they should call [phone]    How would you like to obtain your AVS? MyChart    Are changes needed to the allergy or medication list? No    Are refills needed on medications prescribed by this physician? NO    Rooming Documentation:  Questionnaire(s) completed Unable to complete questionnaire(s) due to time      Reason for visit: BETO PANG    
Marcelle Cadena(Attending)

## 2024-09-04 ENCOUNTER — TELEPHONE (OUTPATIENT)
Dept: TRANSPLANT | Facility: CLINIC | Age: 69
End: 2024-09-04
Payer: COMMERCIAL

## 2024-09-04 DIAGNOSIS — Z94.0 KIDNEY REPLACED BY TRANSPLANT: Primary | ICD-10-CM

## 2024-09-04 NOTE — TELEPHONE ENCOUNTER
Sam Ramirez MD Sveiven, Sara, RN  Meliton Pizano,    Can we please switch AZA to  mg twice a day for recurrent skin lesions. He should repeat labs this week. I've added EBV PCR, lipid panel, triglycerides and UPCR.    Thank you,  Sam    OUTCOME: AZA RX discontinued, MPA RX sent to preferred pharmacy.  Patient is to start the MPA 24 hours after last AZA dose.      Jerica Ramirez RN   Transplant Coordinator  [phone]

## 2024-09-07 ENCOUNTER — TELEPHONE (OUTPATIENT)
Dept: NEPHROLOGY | Facility: CLINIC | Age: 69
End: 2024-09-07

## 2024-09-07 ENCOUNTER — LAB (OUTPATIENT)
Dept: LAB | Facility: CLINIC | Age: 69
End: 2024-09-07
Payer: COMMERCIAL

## 2024-09-07 DIAGNOSIS — Z94.0 KIDNEY REPLACED BY TRANSPLANT: ICD-10-CM

## 2024-09-07 DIAGNOSIS — B27.00 EBV (EPSTEIN-BARR VIRUS) VIREMIA: ICD-10-CM

## 2024-09-07 DIAGNOSIS — Z94.83 PANCREAS REPLACED BY TRANSPLANT (H): ICD-10-CM

## 2024-09-07 DIAGNOSIS — D84.9 IMMUNOSUPPRESSION (H): ICD-10-CM

## 2024-09-07 LAB
ALBUMIN MFR UR ELPH: 30.5 MG/DL
ANION GAP SERPL CALCULATED.3IONS-SCNC: 11 MMOL/L (ref 7–15)
BUN SERPL-MCNC: 35.1 MG/DL (ref 8–23)
CALCIUM SERPL-MCNC: 9.9 MG/DL (ref 8.8–10.4)
CHLORIDE SERPL-SCNC: 103 MMOL/L (ref 98–107)
CHOLEST SERPL-MCNC: 134 MG/DL
CREAT SERPL-MCNC: 1.65 MG/DL (ref 0.51–1.17)
CREAT UR-MCNC: 64.1 MG/DL
EGFRCR SERPLBLD CKD-EPI 2021: 44 ML/MIN/1.73M2
ERYTHROCYTE [DISTWIDTH] IN BLOOD BY AUTOMATED COUNT: 13 % (ref 10–15)
FASTING STATUS PATIENT QL REPORTED: ABNORMAL
FASTING STATUS PATIENT QL REPORTED: NORMAL
GLUCOSE SERPL-MCNC: 130 MG/DL (ref 70–99)
HCO3 SERPL-SCNC: 21 MMOL/L (ref 22–29)
HCT VFR BLD AUTO: 39 % (ref 35–53)
HDLC SERPL-MCNC: 40 MG/DL
HGB BLD-MCNC: 13.3 G/DL (ref 11.7–17.7)
LDLC SERPL CALC-MCNC: 80 MG/DL
MCH RBC QN AUTO: 35.9 PG (ref 26.5–33)
MCHC RBC AUTO-ENTMCNC: 34.1 G/DL (ref 31.5–36.5)
MCV RBC AUTO: 105 FL (ref 78–100)
NONHDLC SERPL-MCNC: 94 MG/DL
PLATELET # BLD AUTO: 297 10E3/UL (ref 150–450)
POTASSIUM SERPL-SCNC: 6.2 MMOL/L (ref 3.4–5.3)
PROT/CREAT 24H UR: 0.48 MG/MG CR (ref 0–0.2)
RBC # BLD AUTO: 3.7 10E6/UL (ref 3.8–5.9)
SODIUM SERPL-SCNC: 135 MMOL/L (ref 135–145)
TRIGL SERPL-MCNC: 69 MG/DL
WBC # BLD AUTO: 5.6 10E3/UL (ref 4–11)

## 2024-09-07 PROCEDURE — 80048 BASIC METABOLIC PNL TOTAL CA: CPT

## 2024-09-07 PROCEDURE — 87799 DETECT AGENT NOS DNA QUANT: CPT

## 2024-09-07 PROCEDURE — 80061 LIPID PANEL: CPT

## 2024-09-07 PROCEDURE — 85027 COMPLETE CBC AUTOMATED: CPT

## 2024-09-07 PROCEDURE — 84156 ASSAY OF PROTEIN URINE: CPT

## 2024-09-07 PROCEDURE — 36415 COLL VENOUS BLD VENIPUNCTURE: CPT

## 2024-09-08 ENCOUNTER — TELEPHONE (OUTPATIENT)
Dept: TRANSPLANT | Facility: CLINIC | Age: 69
End: 2024-09-08
Payer: COMMERCIAL

## 2024-09-08 DIAGNOSIS — Z94.0 KIDNEY REPLACED BY TRANSPLANT: Primary | ICD-10-CM

## 2024-09-08 DIAGNOSIS — E87.5 HIGH POTASSIUM: ICD-10-CM

## 2024-09-08 LAB
EBV DNA SERPL NAA+PROBE-ACNC: <35 IU/ML
EBV DNA SERPL NAA+PROBE-LOG#: <1.5 {LOG_COPIES}/ML

## 2024-09-08 NOTE — TELEPHONE ENCOUNTER
Spoke with Mathew.per dr José Luis Roman patient to hold lisinopril and lokelma daily. Dispense 14 days worth.     He did not go to the ER. He reports that he is going to see doctor in the morning and wants to discuss with him.    Pt aware to hold lisinopril.     Pt aware will send lokelma to local pharmacy and instructed it's daily in afternoon separate from tx meds.     Pt will call with any changes.

## 2024-09-08 NOTE — TELEPHONE ENCOUNTER
I received a page from laboratory about a critical result of potassium 6.2 on the labs that Mr Vaughan had drawn today. I spoke with him on the phone and advised him my recommendation to go to ER to have potassium rechecked, EKG checked, and hyperkalemia treated, and the risks of hyperkalemia including heart dysrhythmias including cardiac arrest. He verbalized understanding.

## 2024-09-08 NOTE — TELEPHONE ENCOUNTER
Contacted Mathew to check In on him regarding potassium and if went to the ER and to discuss medication changes. No answer. Left message for return call.

## 2024-09-09 ENCOUNTER — TELEPHONE (OUTPATIENT)
Dept: TRANSPLANT | Facility: CLINIC | Age: 69
End: 2024-09-09
Payer: COMMERCIAL

## 2024-09-09 DIAGNOSIS — Z94.0 KIDNEY REPLACED BY TRANSPLANT: Primary | ICD-10-CM

## 2024-09-09 RX ORDER — MYCOPHENOLIC ACID 360 MG/1
360 TABLET, DELAYED RELEASE ORAL 2 TIMES DAILY
Qty: 60 TABLET | Refills: 11 | Status: SHIPPED | OUTPATIENT
Start: 2024-09-09 | End: 2024-10-03

## 2024-09-09 NOTE — TELEPHONE ENCOUNTER
PA Initiation    Medication: LOKELMA 10 G PO PACK  Insurance Company: [employer] - Phone [phone] Fax [phone]  Pharmacy Filling the Rx: [id number] [place], [place] - [address] [place] [address] MARYANN  Start Date: 9/9/2024        Thank you,  Gracia Laughlin Oncology/Transplant Liaison  Phone: [phone]  Fax: [phone]

## 2024-09-09 NOTE — TELEPHONE ENCOUNTER
Sam Ramirez MD Sveiven, Sara, RN  Meliton Pizano,    Can we please ask him to repeat labs today or tomorrow? He was told by nephrology on call to go to ER but he didn't. Yesterday, I asked him to stop Lisinopril and start Lokelma.    Thank you,  Sam    OUTCOME:  Spoke with Mathew who reports that he did hold his lisinopril as directed yesterday, however he has yet to obtain the lokelma RX.  He V/U of picking up RX to start this afternoon.  Instructed him to please have labs done tomorrow- orders placed.    Discussed again being evaluated in the ED in which he declines.  He denies any heart palpitations and states he is feeling well    Jerica Ramirez RN   Transplant Coordinator  [phone]

## 2024-09-10 ENCOUNTER — TELEPHONE (OUTPATIENT)
Dept: TRANSPLANT | Facility: CLINIC | Age: 69
End: 2024-09-10
Payer: COMMERCIAL

## 2024-09-10 DIAGNOSIS — E87.5 HIGH POTASSIUM: Primary | ICD-10-CM

## 2024-09-10 RX ORDER — PATIROMER 8.4 G/1
8.4 POWDER, FOR SUSPENSION ORAL DAILY
Qty: 3 PACKET | Refills: 0 | Status: SHIPPED | OUTPATIENT
Start: 2024-09-10 | End: 2024-09-13

## 2024-09-10 NOTE — TELEPHONE ENCOUNTER
Gracia Celeste, Jerica, KRISTOFER Pizano,    I am working on the PA for Mathew cori Lisandrama. They are asking me if there is a reason that he can't use the formulary alternative of Veltassa. Is there any reason or would a switch to the formulary alternative be okay?    Thank you,  Gracia Laughlin Oncology/Transplant Liaison  Phone: [phone]  Fax: [phone]    OUTCOME: RX updated to Veltassa 8.4 g daily X3 days     Jerica Ramirez RN   Transplant Coordinator  [phone]

## 2024-09-10 NOTE — TELEPHONE ENCOUNTER
Received a fax requesting additional information as to why Mathew cannot try the preferred alternative of Veltassa. Per KRISTOFER Pizano, this can be switched and she sent the prescription in.    Velendyssa paid at the pharmacy for a copay of about $40.    Thank you,  Gracia Laughlin Oncology/Transplant Liaison  Phone: [phone]  Fax: [phone]

## 2024-09-14 ENCOUNTER — LAB (OUTPATIENT)
Dept: LAB | Facility: CLINIC | Age: 69
End: 2024-09-14
Payer: COMMERCIAL

## 2024-09-14 DIAGNOSIS — Z94.83 PANCREAS REPLACED BY TRANSPLANT (H): ICD-10-CM

## 2024-09-14 DIAGNOSIS — Z94.0 KIDNEY REPLACED BY TRANSPLANT: ICD-10-CM

## 2024-09-14 LAB
ALBUMIN MFR UR ELPH: 31.2 MG/DL
ANION GAP SERPL CALCULATED.3IONS-SCNC: 11 MMOL/L (ref 7–15)
BUN SERPL-MCNC: 34.4 MG/DL (ref 8–23)
CALCIUM SERPL-MCNC: 9.1 MG/DL (ref 8.8–10.4)
CHLORIDE SERPL-SCNC: 101 MMOL/L (ref 98–107)
CREAT SERPL-MCNC: 1.62 MG/DL (ref 0.51–1.17)
CREAT UR-MCNC: 67.3 MG/DL
EGFRCR SERPLBLD CKD-EPI 2021: 45 ML/MIN/1.73M2
ERYTHROCYTE [DISTWIDTH] IN BLOOD BY AUTOMATED COUNT: 12.8 % (ref 10–15)
GLUCOSE SERPL-MCNC: 266 MG/DL (ref 70–99)
HCO3 SERPL-SCNC: 20 MMOL/L (ref 22–29)
HCT VFR BLD AUTO: 35.8 % (ref 35–53)
HGB BLD-MCNC: 12.6 G/DL (ref 11.7–17.7)
MCH RBC QN AUTO: 35.5 PG (ref 26.5–33)
MCHC RBC AUTO-ENTMCNC: 35.2 G/DL (ref 31.5–36.5)
MCV RBC AUTO: 101 FL (ref 78–100)
PLATELET # BLD AUTO: 310 10E3/UL (ref 150–450)
POTASSIUM SERPL-SCNC: 4.5 MMOL/L (ref 3.4–5.3)
PROT/CREAT 24H UR: 0.46 MG/MG CR (ref 0–0.2)
RBC # BLD AUTO: 3.55 10E6/UL (ref 3.8–5.9)
SODIUM SERPL-SCNC: 132 MMOL/L (ref 135–145)
WBC # BLD AUTO: 6.3 10E3/UL (ref 4–11)

## 2024-09-14 PROCEDURE — 87799 DETECT AGENT NOS DNA QUANT: CPT

## 2024-09-14 PROCEDURE — 84156 ASSAY OF PROTEIN URINE: CPT

## 2024-09-14 PROCEDURE — 80048 BASIC METABOLIC PNL TOTAL CA: CPT

## 2024-09-14 PROCEDURE — 36415 COLL VENOUS BLD VENIPUNCTURE: CPT

## 2024-09-14 PROCEDURE — 80158 DRUG ASSAY CYCLOSPORINE: CPT

## 2024-09-14 PROCEDURE — 85027 COMPLETE CBC AUTOMATED: CPT

## 2024-09-15 LAB — EBV DNA SERPL NAA+PROBE-ACNC: NOT DETECTED IU/ML

## 2024-09-16 ENCOUNTER — TELEPHONE (OUTPATIENT)
Dept: TRANSPLANT | Facility: CLINIC | Age: 69
End: 2024-09-16
Payer: COMMERCIAL

## 2024-09-16 DIAGNOSIS — E87.5 HIGH POTASSIUM: Primary | ICD-10-CM

## 2024-09-16 DIAGNOSIS — Z94.0 KIDNEY REPLACED BY TRANSPLANT: ICD-10-CM

## 2024-09-16 LAB
CYCLOSPORINE BLD LC/MS/MS-MCNC: 206 UG/L (ref 50–400)
TME LAST DOSE: NORMAL H
TME LAST DOSE: NORMAL H

## 2024-09-16 NOTE — TELEPHONE ENCOUNTER
ISSUE: BG elevated at 206 on 9/14.    OUTCOME: Patient states he likely forgot to take a dose of insulin.  States he will keeping on top of his meds and has been feeling freat.      Will repeat a BMP and CSA trough in 1-2 weeks.    Jerica Ramirez RN   Transplant Coordinator  [phone]

## 2024-09-17 ENCOUNTER — MYC MEDICAL ADVICE (OUTPATIENT)
Dept: TRANSPLANT | Facility: CLINIC | Age: 69
End: 2024-09-17
Payer: COMMERCIAL

## 2024-09-17 DIAGNOSIS — Z94.0 KIDNEY REPLACED BY TRANSPLANT: Primary | ICD-10-CM

## 2024-10-03 DIAGNOSIS — Z94.0 KIDNEY REPLACED BY TRANSPLANT: ICD-10-CM

## 2024-10-03 RX ORDER — MYCOPHENOLIC ACID 360 MG/1
360 TABLET, DELAYED RELEASE ORAL 2 TIMES DAILY
Qty: 180 TABLET | Refills: 3 | Status: SHIPPED | OUTPATIENT
Start: 2024-10-03

## 2024-11-02 ENCOUNTER — HEALTH MAINTENANCE LETTER (OUTPATIENT)
Age: 69
End: 2024-11-02

## 2025-05-04 ENCOUNTER — HEALTH MAINTENANCE LETTER (OUTPATIENT)
Age: 70
End: 2025-05-04

## 2025-05-21 DIAGNOSIS — I15.1 HTN, KIDNEY TRANSPLANT RELATED: ICD-10-CM

## 2025-05-21 DIAGNOSIS — Z94.0 KIDNEY REPLACED BY TRANSPLANT: ICD-10-CM

## 2025-05-21 DIAGNOSIS — Z94.83 PANCREAS REPLACED BY TRANSPLANT (H): ICD-10-CM

## 2025-05-21 DIAGNOSIS — Z94.0 HTN, KIDNEY TRANSPLANT RELATED: ICD-10-CM

## 2025-05-21 RX ORDER — CYCLOSPORINE 25 MG/1
CAPSULE, LIQUID FILLED ORAL
Qty: 210 CAPSULE | Refills: 11 | Status: SHIPPED | OUTPATIENT
Start: 2025-05-21

## 2025-07-06 ENCOUNTER — HEALTH MAINTENANCE LETTER (OUTPATIENT)
Age: 70
End: 2025-07-06

## 2025-07-12 ENCOUNTER — TELEPHONE (OUTPATIENT)
Dept: NEPHROLOGY | Facility: CLINIC | Age: 70
End: 2025-07-12

## 2025-07-12 ENCOUNTER — LAB (OUTPATIENT)
Dept: LAB | Facility: CLINIC | Age: 70
End: 2025-07-12
Payer: COMMERCIAL

## 2025-07-12 DIAGNOSIS — I15.1 HTN, KIDNEY TRANSPLANT RELATED: ICD-10-CM

## 2025-07-12 DIAGNOSIS — Z94.0 HTN, KIDNEY TRANSPLANT RELATED: ICD-10-CM

## 2025-07-12 DIAGNOSIS — Z94.0 KIDNEY REPLACED BY TRANSPLANT: ICD-10-CM

## 2025-07-12 LAB
ANION GAP SERPL CALCULATED.3IONS-SCNC: 12 MMOL/L (ref 7–15)
BUN SERPL-MCNC: 101 MG/DL (ref 8–23)
CALCIUM SERPL-MCNC: 9.4 MG/DL (ref 8.8–10.4)
CHLORIDE SERPL-SCNC: 105 MMOL/L (ref 98–107)
CREAT SERPL-MCNC: 4.43 MG/DL (ref 0.51–1.17)
EGFRCR SERPLBLD CKD-EPI 2021: 13 ML/MIN/1.73M2
ERYTHROCYTE [DISTWIDTH] IN BLOOD BY AUTOMATED COUNT: 12.7 % (ref 10–15)
GLUCOSE SERPL-MCNC: 241 MG/DL (ref 70–99)
HCO3 SERPL-SCNC: 10 MMOL/L (ref 22–29)
HCT VFR BLD AUTO: 33.7 % (ref 35–53)
HGB BLD-MCNC: 11.5 G/DL (ref 11.7–17.7)
MCH RBC QN AUTO: 34.5 PG (ref 26.5–33)
MCHC RBC AUTO-ENTMCNC: 34.1 G/DL (ref 31.5–36.5)
MCV RBC AUTO: 101 FL (ref 78–100)
PLATELET # BLD AUTO: 214 10E3/UL (ref 150–450)
POTASSIUM SERPL-SCNC: 5.8 MMOL/L (ref 3.4–5.3)
RBC # BLD AUTO: 3.33 10E6/UL (ref 3.8–5.9)
SODIUM SERPL-SCNC: 127 MMOL/L (ref 135–145)
WBC # BLD AUTO: 7.1 10E3/UL (ref 4–11)

## 2025-07-12 PROCEDURE — 80048 BASIC METABOLIC PNL TOTAL CA: CPT

## 2025-07-12 PROCEDURE — 36415 COLL VENOUS BLD VENIPUNCTURE: CPT

## 2025-07-12 PROCEDURE — 85027 COMPLETE CBC AUTOMATED: CPT

## 2025-07-13 NOTE — PROGRESS NOTES
Received page from lab regarding a critical bicarb level of 10. I spoke with the patient regarding his labs results and recommended he be evaluated at his local ED for repeat labs and evaluation. He was hesitant to be evaluated over the weekend. I again advised he go to his local ED but told him if he did not, to call the nephrology clinic first thing Monday morning to set up repeat labs. He expressed understanding.

## 2025-07-14 ENCOUNTER — TELEPHONE (OUTPATIENT)
Dept: TRANSPLANT | Facility: CLINIC | Age: 70
End: 2025-07-14
Payer: COMMERCIAL

## 2025-07-14 ENCOUNTER — HOSPITAL ENCOUNTER (INPATIENT)
Facility: CLINIC | Age: 70
LOS: 3 days | Discharge: HOME | End: 2025-07-17
Attending: EMERGENCY MEDICINE | Admitting: STUDENT IN AN ORGANIZED HEALTH CARE EDUCATION/TRAINING PROGRAM
Payer: COMMERCIAL

## 2025-07-14 DIAGNOSIS — N17.9 ACUTE RENAL FAILURE SUPERIMPOSED ON STAGE 4 CHRONIC KIDNEY DISEASE, UNSPECIFIED ACUTE RENAL FAILURE TYPE (H): ICD-10-CM

## 2025-07-14 DIAGNOSIS — N18.4 ACUTE RENAL FAILURE SUPERIMPOSED ON STAGE 4 CHRONIC KIDNEY DISEASE, UNSPECIFIED ACUTE RENAL FAILURE TYPE (H): ICD-10-CM

## 2025-07-14 DIAGNOSIS — I15.1 HTN, KIDNEY TRANSPLANT RELATED: ICD-10-CM

## 2025-07-14 DIAGNOSIS — E86.0 DEHYDRATION: ICD-10-CM

## 2025-07-14 DIAGNOSIS — R19.7 DIARRHEA, UNSPECIFIED TYPE: ICD-10-CM

## 2025-07-14 DIAGNOSIS — Z94.0 HTN, KIDNEY TRANSPLANT RELATED: ICD-10-CM

## 2025-07-14 DIAGNOSIS — Z94.0 HISTORY OF KIDNEY TRANSPLANT: ICD-10-CM

## 2025-07-14 DIAGNOSIS — N19 UREMIA: ICD-10-CM

## 2025-07-14 PROBLEM — L57.0 SENILE HYPERKERATOSIS: Status: ACTIVE | Noted: 2025-07-14

## 2025-07-14 PROBLEM — E11.8 CONTROLLED TYPE 2 DIABETES MELLITUS WITH COMPLICATION, WITH LONG-TERM CURRENT USE OF INSULIN (H): Status: ACTIVE | Noted: 2024-09-09

## 2025-07-14 PROBLEM — Z79.4 CONTROLLED TYPE 2 DIABETES MELLITUS WITH COMPLICATION, WITH LONG-TERM CURRENT USE OF INSULIN (H): Status: ACTIVE | Noted: 2024-09-09

## 2025-07-14 PROBLEM — D47.2 MONOCLONAL GAMMOPATHY OF UNDETERMINED SIGNIFICANCE (MGUS): Status: ACTIVE | Noted: 2025-07-14

## 2025-07-14 LAB
ANION GAP SERPL CALCULATED.3IONS-SCNC: 14 MMOL/L (ref 7–15)
ATRIAL RATE - MUSE: 68 BPM
B-OH-BUTYR SERPL-SCNC: 1.44 MMOL/L
BASOPHILS # BLD AUTO: 0 10E3/UL (ref 0–0.2)
BASOPHILS NFR BLD AUTO: 0 %
BUN SERPL-MCNC: 109.7 MG/DL (ref 8–23)
CALCIUM SERPL-MCNC: 9.5 MG/DL (ref 8.8–10.4)
CHLORIDE SERPL-SCNC: 105 MMOL/L (ref 98–107)
CREAT SERPL-MCNC: 3.42 MG/DL (ref 0.51–1.17)
DIASTOLIC BLOOD PRESSURE - MUSE: NORMAL MMHG
EGFRCR SERPLBLD CKD-EPI 2021: 18 ML/MIN/1.73M2
EOSINOPHIL # BLD AUTO: 0 10E3/UL (ref 0–0.7)
EOSINOPHIL NFR BLD AUTO: 0 %
ERYTHROCYTE [DISTWIDTH] IN BLOOD BY AUTOMATED COUNT: 13.3 % (ref 10–15)
GLUCOSE BLDC GLUCOMTR-MCNC: 142 MG/DL (ref 70–99)
GLUCOSE BLDC GLUCOMTR-MCNC: 144 MG/DL (ref 70–99)
GLUCOSE BLDC GLUCOMTR-MCNC: 149 MG/DL (ref 70–99)
GLUCOSE SERPL-MCNC: 198 MG/DL (ref 70–99)
HCO3 SERPL-SCNC: 10 MMOL/L (ref 22–29)
HCT VFR BLD AUTO: 34.5 % (ref 35–53)
HGB BLD-MCNC: 11.5 G/DL (ref 11.7–17.7)
IMM GRANULOCYTES # BLD: 0.1 10E3/UL
IMM GRANULOCYTES NFR BLD: 1 %
INTERPRETATION ECG - MUSE: NORMAL
LIPASE SERPL-CCNC: 88 U/L (ref 13–60)
LYMPHOCYTES # BLD AUTO: 1.1 10E3/UL (ref 0.8–5.3)
LYMPHOCYTES NFR BLD AUTO: 16 %
MCH RBC QN AUTO: 34.6 PG (ref 26.5–33)
MCHC RBC AUTO-ENTMCNC: 33.3 G/DL (ref 31.5–36.5)
MCV RBC AUTO: 104 FL (ref 78–100)
MONOCYTES # BLD AUTO: 0.6 10E3/UL (ref 0–1.3)
MONOCYTES NFR BLD AUTO: 10 %
NEUTROPHILS # BLD AUTO: 4.8 10E3/UL (ref 1.6–8.3)
NEUTROPHILS NFR BLD AUTO: 73 %
NRBC # BLD AUTO: 0 10E3/UL
NRBC BLD AUTO-RTO: 0 /100
P AXIS - MUSE: 103 DEGREES
PLATELET # BLD AUTO: 222 10E3/UL (ref 150–450)
POTASSIUM SERPL-SCNC: 5.3 MMOL/L (ref 3.4–5.3)
PR INTERVAL - MUSE: 194 MS
QRS DURATION - MUSE: 166 MS
QT - MUSE: 426 MS
QTC - MUSE: 452 MS
R AXIS - MUSE: 134 DEGREES
RBC # BLD AUTO: 3.32 10E6/UL (ref 3.8–5.9)
SODIUM SERPL-SCNC: 129 MMOL/L (ref 135–145)
SYSTOLIC BLOOD PRESSURE - MUSE: NORMAL MMHG
T AXIS - MUSE: 133 DEGREES
VENTRICULAR RATE- MUSE: 68 BPM
WBC # BLD AUTO: 6.6 10E3/UL (ref 4–11)

## 2025-07-14 PROCEDURE — 93005 ELECTROCARDIOGRAM TRACING: CPT

## 2025-07-14 PROCEDURE — 120N000001 HC R&B MED SURG/OB

## 2025-07-14 PROCEDURE — 80048 BASIC METABOLIC PNL TOTAL CA: CPT | Performed by: EMERGENCY MEDICINE

## 2025-07-14 PROCEDURE — 250N000012 HC RX MED GY IP 250 OP 636 PS 637: Performed by: STUDENT IN AN ORGANIZED HEALTH CARE EDUCATION/TRAINING PROGRAM

## 2025-07-14 PROCEDURE — 82010 KETONE BODYS QUAN: CPT | Performed by: EMERGENCY MEDICINE

## 2025-07-14 PROCEDURE — 250N000009 HC RX 250: Performed by: INTERNAL MEDICINE

## 2025-07-14 PROCEDURE — 96374 THER/PROPH/DIAG INJ IV PUSH: CPT

## 2025-07-14 PROCEDURE — 99285 EMERGENCY DEPT VISIT HI MDM: CPT | Mod: 25 | Performed by: EMERGENCY MEDICINE

## 2025-07-14 PROCEDURE — 83690 ASSAY OF LIPASE: CPT | Performed by: EMERGENCY MEDICINE

## 2025-07-14 PROCEDURE — 99223 1ST HOSP IP/OBS HIGH 75: CPT | Performed by: STUDENT IN AN ORGANIZED HEALTH CARE EDUCATION/TRAINING PROGRAM

## 2025-07-14 PROCEDURE — 85025 COMPLETE CBC W/AUTO DIFF WBC: CPT | Performed by: EMERGENCY MEDICINE

## 2025-07-14 PROCEDURE — 250N000009 HC RX 250: Performed by: STUDENT IN AN ORGANIZED HEALTH CARE EDUCATION/TRAINING PROGRAM

## 2025-07-14 PROCEDURE — 250N000011 HC RX IP 250 OP 636: Performed by: EMERGENCY MEDICINE

## 2025-07-14 PROCEDURE — 36415 COLL VENOUS BLD VENIPUNCTURE: CPT | Performed by: EMERGENCY MEDICINE

## 2025-07-14 PROCEDURE — 36415 COLL VENOUS BLD VENIPUNCTURE: CPT | Performed by: STUDENT IN AN ORGANIZED HEALTH CARE EDUCATION/TRAINING PROGRAM

## 2025-07-14 PROCEDURE — 250N000013 HC RX MED GY IP 250 OP 250 PS 637: Performed by: STUDENT IN AN ORGANIZED HEALTH CARE EDUCATION/TRAINING PROGRAM

## 2025-07-14 PROCEDURE — 99255 IP/OBS CONSLTJ NEW/EST HI 80: CPT | Performed by: INTERNAL MEDICINE

## 2025-07-14 PROCEDURE — 82962 GLUCOSE BLOOD TEST: CPT

## 2025-07-14 PROCEDURE — 80158 DRUG ASSAY CYCLOSPORINE: CPT | Performed by: STUDENT IN AN ORGANIZED HEALTH CARE EDUCATION/TRAINING PROGRAM

## 2025-07-14 RX ORDER — BUSPIRONE HYDROCHLORIDE 10 MG/1
10 TABLET ORAL 2 TIMES DAILY
Status: DISCONTINUED | OUTPATIENT
Start: 2025-07-14 | End: 2025-07-17 | Stop reason: HOSPADM

## 2025-07-14 RX ORDER — CYCLOSPORINE 100 MG/1
100 CAPSULE, LIQUID FILLED ORAL EVERY MORNING
Status: DISCONTINUED | OUTPATIENT
Start: 2025-07-15 | End: 2025-07-17 | Stop reason: HOSPADM

## 2025-07-14 RX ORDER — ONDANSETRON 2 MG/ML
4 INJECTION INTRAMUSCULAR; INTRAVENOUS ONCE
Status: COMPLETED | OUTPATIENT
Start: 2025-07-14 | End: 2025-07-14

## 2025-07-14 RX ORDER — INSULIN ASPART 100 [IU]/ML
9 INJECTION, SOLUTION INTRAVENOUS; SUBCUTANEOUS
COMMUNITY

## 2025-07-14 RX ORDER — INSULIN ASPART 100 [IU]/ML
5-7 INJECTION, SOLUTION INTRAVENOUS; SUBCUTANEOUS
COMMUNITY

## 2025-07-14 RX ORDER — INSULIN ASPART 100 [IU]/ML
5 INJECTION, SOLUTION INTRAVENOUS; SUBCUTANEOUS
COMMUNITY

## 2025-07-14 RX ORDER — BUSPIRONE HYDROCHLORIDE 10 MG/1
10 TABLET ORAL 2 TIMES DAILY
COMMUNITY

## 2025-07-14 RX ORDER — CALCIUM CARBONATE 500 MG/1
1000 TABLET, CHEWABLE ORAL 4 TIMES DAILY PRN
Status: DISCONTINUED | OUTPATIENT
Start: 2025-07-14 | End: 2025-07-17 | Stop reason: HOSPADM

## 2025-07-14 RX ORDER — ESCITALOPRAM OXALATE 10 MG/1
10 TABLET ORAL DAILY
Status: DISCONTINUED | OUTPATIENT
Start: 2025-07-15 | End: 2025-07-17 | Stop reason: HOSPADM

## 2025-07-14 RX ORDER — ONDANSETRON 2 MG/ML
4 INJECTION INTRAMUSCULAR; INTRAVENOUS EVERY 6 HOURS PRN
Status: DISCONTINUED | OUTPATIENT
Start: 2025-07-14 | End: 2025-07-17 | Stop reason: HOSPADM

## 2025-07-14 RX ORDER — AMOXICILLIN 250 MG
1 CAPSULE ORAL 2 TIMES DAILY PRN
Status: DISCONTINUED | OUTPATIENT
Start: 2025-07-14 | End: 2025-07-17 | Stop reason: HOSPADM

## 2025-07-14 RX ORDER — PROCHLORPERAZINE MALEATE 5 MG/1
5 TABLET ORAL EVERY 6 HOURS PRN
Status: DISCONTINUED | OUTPATIENT
Start: 2025-07-14 | End: 2025-07-17 | Stop reason: HOSPADM

## 2025-07-14 RX ORDER — METOPROLOL SUCCINATE 100 MG/1
100 TABLET, EXTENDED RELEASE ORAL EVERY EVENING
COMMUNITY

## 2025-07-14 RX ORDER — ONDANSETRON 4 MG/1
4 TABLET, ORALLY DISINTEGRATING ORAL EVERY 6 HOURS PRN
Status: DISCONTINUED | OUTPATIENT
Start: 2025-07-14 | End: 2025-07-17 | Stop reason: HOSPADM

## 2025-07-14 RX ORDER — DEXTROSE MONOHYDRATE 25 G/50ML
25-50 INJECTION, SOLUTION INTRAVENOUS
Status: DISCONTINUED | OUTPATIENT
Start: 2025-07-14 | End: 2025-07-17 | Stop reason: HOSPADM

## 2025-07-14 RX ORDER — MYCOPHENOLIC ACID 360 MG/1
360 TABLET, DELAYED RELEASE ORAL 2 TIMES DAILY
Status: DISCONTINUED | OUTPATIENT
Start: 2025-07-14 | End: 2025-07-17 | Stop reason: HOSPADM

## 2025-07-14 RX ORDER — AMLODIPINE BESYLATE 10 MG/1
10 TABLET ORAL DAILY
COMMUNITY

## 2025-07-14 RX ORDER — NIACINAMIDE 500 MG
500 TABLET ORAL 2 TIMES DAILY WITH MEALS
COMMUNITY

## 2025-07-14 RX ORDER — SODIUM BICARBONATE
3 POWDER (GRAM) MISCELLANEOUS DAILY
COMMUNITY

## 2025-07-14 RX ORDER — LIDOCAINE 40 MG/G
CREAM TOPICAL
Status: DISCONTINUED | OUTPATIENT
Start: 2025-07-14 | End: 2025-07-17 | Stop reason: HOSPADM

## 2025-07-14 RX ORDER — NIACINAMIDE 500 MG
500 TABLET ORAL 2 TIMES DAILY WITH MEALS
Status: DISCONTINUED | OUTPATIENT
Start: 2025-07-14 | End: 2025-07-17 | Stop reason: HOSPADM

## 2025-07-14 RX ORDER — AMOXICILLIN 250 MG
2 CAPSULE ORAL 2 TIMES DAILY PRN
Status: DISCONTINUED | OUTPATIENT
Start: 2025-07-14 | End: 2025-07-17 | Stop reason: HOSPADM

## 2025-07-14 RX ORDER — METOPROLOL SUCCINATE 100 MG/1
100 TABLET, EXTENDED RELEASE ORAL EVERY EVENING
Status: DISCONTINUED | OUTPATIENT
Start: 2025-07-14 | End: 2025-07-17 | Stop reason: HOSPADM

## 2025-07-14 RX ORDER — ESCITALOPRAM OXALATE 10 MG/1
10 TABLET ORAL DAILY
COMMUNITY

## 2025-07-14 RX ORDER — NICOTINE POLACRILEX 4 MG
15-30 LOZENGE BUCCAL
Status: DISCONTINUED | OUTPATIENT
Start: 2025-07-14 | End: 2025-07-17 | Stop reason: HOSPADM

## 2025-07-14 RX ADMIN — ONDANSETRON 4 MG: 2 INJECTION, SOLUTION INTRAMUSCULAR; INTRAVENOUS at 14:15

## 2025-07-14 RX ADMIN — METOPROLOL SUCCINATE 100 MG: 100 TABLET, EXTENDED RELEASE ORAL at 19:42

## 2025-07-14 RX ADMIN — INSULIN GLARGINE 15 UNITS: 100 INJECTION, SOLUTION SUBCUTANEOUS at 22:12

## 2025-07-14 RX ADMIN — INSULIN ASPART 1 UNITS: 100 INJECTION, SOLUTION INTRAVENOUS; SUBCUTANEOUS at 19:41

## 2025-07-14 RX ADMIN — BUSPIRONE HYDROCHLORIDE 10 MG: 10 TABLET ORAL at 21:54

## 2025-07-14 RX ADMIN — SODIUM BICARBONATE: 84 INJECTION, SOLUTION INTRAVENOUS at 14:16

## 2025-07-14 RX ADMIN — MYCOPHENOLIC ACID 360 MG: 360 TABLET, DELAYED RELEASE ORAL at 22:07

## 2025-07-14 RX ADMIN — SODIUM BICARBONATE: 84 INJECTION, SOLUTION INTRAVENOUS at 21:54

## 2025-07-14 ASSESSMENT — COLUMBIA-SUICIDE SEVERITY RATING SCALE - C-SSRS
6. HAVE YOU EVER DONE ANYTHING, STARTED TO DO ANYTHING, OR PREPARED TO DO ANYTHING TO END YOUR LIFE?: NO
2. HAVE YOU ACTUALLY HAD ANY THOUGHTS OF KILLING YOURSELF IN THE PAST MONTH?: NO
1. IN THE PAST MONTH, HAVE YOU WISHED YOU WERE DEAD OR WISHED YOU COULD GO TO SLEEP AND NOT WAKE UP?: NO

## 2025-07-14 ASSESSMENT — ACTIVITIES OF DAILY LIVING (ADL)
ADLS_ACUITY_SCORE: 41

## 2025-07-14 NOTE — TELEPHONE ENCOUNTER
"ISSUE:  Creatinine 4.43, baseline: 1.3-1.6  Co2: 10    OUTCOME:  Patient states he has not been feeling well for ~4 weeks.  He estimates throwing up IS \" a couple\" times over the last month. Recommended immediate presentation to ED for evaluation. Patient V/U. Recommended [facility] ED.    Jerica Ramirez RN   Transplant Coordinator  [phone]      "

## 2025-07-14 NOTE — ED PROVIDER NOTES
Emergency Department Note      History of Present Illness     Chief Complaint   Abnormal Labs      HPI   Mathew Vaughan is a 70 year old adult status post kidney transplant at the [facility] presents because of worsening kidney failure and elevated potassium noted on a blood test on Saturday.  The transplant clinic called him and told him he could go to the emergency room and said [place] was fine for him to go to.  He has had no fevers or chills but did vomit this morning at around 430 at home.  He said he does this periodically.  He has not really eaten or drank anything today and said that his appetite has not been good recently.  He has been taking his medications as instructed.   he does have diarrhea but said that it has been going on for a while and thinks it is due to some of the medication he is taking.  However his wife said they were on a vacation trip in early June and his diarrhea really started after that and is having up to 5 episodes a day of watery stool.  His creatinine is up to 4.43 with a GFR now of 13, CO2 is low at 10, his potassium is 5.8 and his glucose 241, this from Saturday's blood draw.  Finally, the patient states that he has a chronic cough due to posterior nasal drainage but that has not changed she has had no pleuritic chest pain or any chest pain.  The patient is also a pancreas transplant patient.    Independent Historian   None    Review of External Notes   I reviewed the results follow-up note from the transplant clinic today.    Past Medical History     Medical History and Problem List   Past Medical History:   Diagnosis Date    Cellulitis of left ear 10/28/2021    Cellulitis of right lower extremity 7/11/2021    Diabetes (H) 1962    H/O kidney transplant 1989    H/O pancreas transplant (H) 1989    Hypertension     Skin cancer 2012       Medications   amLODIPine (NORVASC) 10 MG tablet  busPIRone (BUSPAR) 10 MG tablet  cycloSPORINE modified (GENERIC EQUIVALENT) 25 MG  "capsule  escitalopram (LEXAPRO) 10 MG tablet  fluticasone (FLONASE) 50 MCG/ACT spray  insulin aspart (NOVOLOG FLEXPEN) 100 UNIT/ML pen  insulin aspart (NOVOLOG FLEXPEN) 100 UNIT/ML pen  insulin aspart (NOVOLOG FLEXPEN) 100 UNIT/ML pen  insulin glargine (LANTUS PEN) 100 UNIT/ML pen  metoprolol succinate ER (TOPROL XL) 100 MG 24 hr tablet  mycophenolic acid (GENERIC EQUIVALENT) 360 MG EC tablet  niacinamide 500 MG tablet  sodium bicarbonate, antacid, POWD powder  vitamin D3 (CHOLECALCIFEROL) 50 mcg (2000 units) tablet  lisinopril (ZESTRIL) 40 MG tablet        Surgical History   Past Surgical History:   Procedure Laterality Date    CATARACT IOL, RT/LT      JOINT REPLACEMENT, HIP RT/LT  , and later    TRANSPLANT PANCREAS, KIDNEY  DONOR, COMBINED         Physical Exam     Patient Vitals for the past 24 hrs:   BP Temp Temp src Pulse Resp SpO2 Height Weight   25 1330 113/50 -- -- 67 14 100 % -- --   25 1300 108/47 -- -- 64 16 96 % -- --   25 0956 113/56 97.8  F (36.6  C) Oral 72 17 97 % 1.651 m (5' 5\") 78.2 kg (172 lb 8 oz)     Physical Exam  Nursing note and vitals reviewed.    Constitutional:  Appears comfortable.    HENT:                Nose normal.  No discharge.      Oral mucosa is somewhat dry.  Eyes:    Conjunctivae are normal without injection.  Pupils are equal.  No jaundice.  Cardiovascular:  Normal rate, regular rhythm with normal S1 and S2.      Normal heart sounds and peripheral pulses 2+ and equal.    Pulmonary:  Effort normal and breath sounds clear to auscultation bilaterally.    No respiratory distress.       GI:    Soft. No distension and no mass. No tenderness.   Musculoskeletal:  Normal range of motion. No extremity deformity.     No edema and no tenderness.    Neurological:   Alert and oriented. No focal weakness.  Skin:    Skin is warm and dry.   Psychiatric:   Behavior is normal. Appropriate mood and affect.     Judgment and thought content normal.       Diagnostics "     Lab Results   Labs Ordered and Resulted from Time of ED Arrival to Time of ED Departure   BASIC METABOLIC PANEL - Abnormal       Result Value    Sodium 129 (*)     Potassium 5.3      Chloride 105      Carbon Dioxide (CO2) 10 (*)     Anion Gap 14      Urea Nitrogen 109.7 (*)     Creatinine 3.42 (*)     GFR Estimate 18 (*)     Calcium 9.5      Glucose 198 (*)    CBC WITH PLATELETS AND DIFFERENTIAL - Abnormal    WBC Count 6.6      RBC Count 3.32 (*)     Hemoglobin 11.5 (*)     Hematocrit 34.5 (*)      (*)     MCH 34.6 (*)     MCHC 33.3      RDW 13.3      Platelet Count 222      % Neutrophils 73      % Lymphocytes 16      % Monocytes 10      % Eosinophils 0      % Basophils 0      % Immature Granulocytes 1      NRBCs per 100 WBC 0      Absolute Neutrophils 4.8      Absolute Lymphocytes 1.1      Absolute Monocytes 0.6      Absolute Eosinophils 0.0      Absolute Basophils 0.0      Absolute Immature Granulocytes 0.1      Absolute NRBCs 0.0     LIPASE - Abnormal    Lipase 88 (*)    KETONE BETA-HYDROXYBUTYRATE QUANTITATIVE, RAPID - Abnormal    Ketone (Beta-Hydroxybutyrate) Quantitative 1.44 (*)    ROUTINE UA WITH MICROSCOPIC REFLEX TO CULTURE   FOCUSED ENTERIC PATHOGEN PANEL BY PCR   ROUTINE PARASITOLOGY EXAM   C. DIFFICILE TOXIN B PCR WITH REFLEX TO C. DIFFICILE EIA       Imaging   No orders to display       EKG   ECG results from 07/14/25   EKG 12-lead, tracing only     Value    Systolic Blood Pressure     Diastolic Blood Pressure     Ventricular Rate 68    Atrial Rate 68    MA Interval 194    QRS Duration 166        QTc 452    P Axis 103    R AXIS 134    T Axis 133    Interpretation ECG      Sinus rhythm  Right bundle branch block  Left posterior fascicular block  ** Bifascicular block **  Abnormal ECG  When compared with ECG of 02-Dec-2004 15:45,  (RBBB and left posterior fascicular block) is now Present     I agree with his EKG interpretation.  He does not have a recent EKG to compare  to.      Independent Interpretation   None    ED Course      Medications Administered   Medications   sodium bicarbonate 150 mEq in sterile water (preservative free) 1,000 mL infusion ( Intravenous $New Bag 7/14/25 1416)   ondansetron (ZOFRAN) injection 4 mg (4 mg Intravenous $Given 7/14/25 1415)       Procedures   Procedures     Discussion of Management   Dr Ferraro Nephrology  Admitting Hospitalist, Dr Brian    ED Course        Additional Documentation  None    Medical Decision Making / Diagnosis     CMS Diagnoses: None    MIPS   None               MDM   Mathew Vaughan is a 70 year old adult status post kidney and pancreatic transplant back in the early 1990s comes in because of diarrhea, some generalized weakness and lightheadedness, and because his kidney failure was much worse on a blood test 2 days ago and his potassium was high.  Labs were repeated today and his sodium was slightly low at 129, potassium now is upper limits of normal at 5.3, chloride 105, bicarb low at 10, .7 with a creatinine 3.42 and GFR of 18.  His anion gap is normal, glucose 198, ketones were mildly elevated at 144 probably due to his dehydration and diarrhea.  Lipase is slightly elevated at 88.  White count normal hemoglobin stable at 11.5.  With this patient being a transplant patient I was not sure if we can keep this patient here and I talked with Dr. Ferraro from nephrology and she saw the patient and states we can manage the patient here for the next day or 2 until we get this patient hydrated and stabilize.  She recommended stool studies which were ordered for the diarrhea, Imodium is okay to use, and will hydrate with bicarb and water through the IV.  She is going to write other orders as well.  Will get a GI consult.  Patient will be admitted to a telemetry bed under the care of Dr. Brian.  Consultation with the transplant team could be obtained if needed and nephrology consultation will be placed as well.    Disposition    The patient was admitted to the hospital.     Diagnosis     ICD-10-CM    1. Acute renal failure superimposed on stage 4 chronic kidney disease, unspecified acute renal failure type (H)  N17.9     N18.4       2. History of kidney transplant  Z94.0       3. Uremia  N19       4. Diarrhea, unspecified type  R19.7       5. Dehydration  E86.0            Discharge Medications   New Prescriptions    No medications on file         MD Patrick Perera Tracy Alan, MD  07/14/25 7037

## 2025-07-14 NOTE — CONSULTS
[facility]    RENAL CONSULTATION NOTE    REFERRING MD:  Patrick    REASON FOR CONSULTATION:  Kidney/Panc tx, RADHA on CKD, Acidosis    DATE OF CONSULTATION: 07/14/25    A/P:  Mathew Vaughan is a 70 year old adult with hx of kidney/panc transplant in 1984 who presents with RADHA, acidosis, hypotension and diarrhea.     RADHA: appears to be pre-renal.  *1L NaHCO3 ordered in sterile water   *Follow up lab in am    2. Acidosis: Non AG chronically likely acidotic due to pancrease tx and inconsistent bicarbonate intake. Now with superimposed diarrhea.  *Sodium bicarb IV  *cont PO sodium bicarbonate (he takes baking soda) as tolerated    3. Diarrhea: Infectious most likely but at risk for cancer/melanoma. No hx of melanoma skin.   *send for C diff, O and P, norovirus, CMV (CMV has been ordered for you) He is EBV negative  *colonoscopy is long over due and recommended last sept by nephrology tx but not completed. Should have GI consult. Likely should also have EGD given nausea and long standing immunosuppression      4. Kidney/Panc: CSaA 100 /75, goal 50-75, Mpa 360 bid  *CSA trough tonight  *cont meds otherwise as ordered    5. HTN: he self held his lisinopril. Concur given he has been dizzy and BP's lower. Cont metoprolol.       Attestation:   A total of 85 minutes was spent today reviewing relevant vital signs, notes, medications, labs and imaging. This includes pertinent historical and outside records. Discussed in person with his coordiator at the     Kathie gamez    HPI: Mathew Vaughan is a 70 year old adult with a hx of pancrease/kidney tx in 1984 at the [facility] who was instructed to come to the ED by his coordinator because of elevated creatinine. At baseline his creatinine is 1.5 to 1.7. On 7/12 it was 4.43. Today it is 3.42. Other notable abnormalities include a bicarbonate of 10, sodium of 129, Glucose of 198, Ketones of 1.44. WBC are normal. Hgb is 11.5. Pancrease tx  moderately dysfunctional and he has required insulin for some time.     Mr. Vaughan reports that he began having diarrhea sometime in . They had gone on a cruise in May, but had no issues at that time and his wife has felt fine. He is having sometimes more than 5 stools a day and urgency. He says that if he has to use the bathroom he has to sit down because he will have large volume stools even when urinating. No blood or melena noted. No ab pain. He has, however, had nausea with vomiting. Poor appetite and his wife reports that he hasn't really eaten in 2 weeks. Blood pressures are typically in the low 100's but he was getting dizzy and blood pressures were dropping into the 90's so he stopped his lisinopril.     No new medications. He has not taken anything over the counter. Other symptoms have included ongoing cough but no SOB. No fever or chills. They did eat out and his wife got ill.             ROS:  A complete review of systems was performed and is x as noted above.    PMH:    Past Medical History:   Diagnosis Date    Cellulitis of left ear 10/28/2021    Cellulitis of right lower extremity 2021    Diabetes (H)     panc [id number]    H/O kidney transplant     H/O pancreas transplant (H)     Hypertension     Skin cancer     multiple Mohs, seen twice a year     PSH:    Past Surgical History:   Procedure Laterality Date    CATARACT IOL, RT/LT      JOINT REPLACEMENT, HIP RT/LT  , and later    TRANSPLANT PANCREAS, KIDNEY  DONOR, COMBINED       MEDICATIONS:    Current Facility-Administered Medications   Medication Dose Route Frequency Provider Last Rate Last Admin    ondansetron (ZOFRAN) injection 4 mg  4 mg Intravenous Once Violeta Nguyen MD         ALLERGIES:    Allergies as of 2025    (No Known Allergies)     FH:    No family history on file.  SH:    Social History     Socioeconomic History    Marital status:      Spouse name: Not on file    Number of  children: Not on file    Years of education: Not on file    Highest education level: Not on file   Occupational History    Not on file   Tobacco Use    Smoking status: Every Day     Types: Pipe    Smokeless tobacco: Never   Substance and Sexual Activity    Alcohol use: Yes    Drug use: Not Currently    Sexual activity: Not on file   Other Topics Concern    Not on file   Social History Narrative    Not on file     Social Drivers of Health     Financial Resource Strain: Low Risk  (9/9/2024)    Received from [facility]    Financial Resource Strain     Difficulty of Paying Living Expenses: 3     Difficulty of Paying Living Expenses: Not on file   Food Insecurity: No Food Insecurity (9/9/2024)    Received from [facility]    Food Insecurity     Do you worry your food will run out before you are able to buy more?: 1   Transportation Needs: No Transportation Needs (9/9/2024)    [facility]    Transportation Needs     Does lack of transportation keep you from medical appointments?: 1     Does lack of transportation keep you from work, meetings or getting things that you need?: 1   Physical Activity: Not on file   Stress: Not on file   Social Connections: Socially Integrated (9/9/2024)    Received from [facility]    Social Connections     Do you often feel lonely or isolated from those around you?: 0   Interpersonal Safety: Not on file   Housing Stability: Low Risk  (9/9/2024)    Received from [facility]    Housing Stability     What is your housing situation today?: 1     PHYSICAL EXAM:    @vitals@    Gen: lying flat. Frequent cough. NAD  LUngs: clear anterior. No wheezing. No dullness to percussion  Card: regular. Soft systolic murmur  Abd: soft. Normal bowel sounds. No tenderness to palpation. No distention  Ext: no LE edema  Neuro: no  asterixis no myoclonus  Skin: too many to count papules, actinic keratoses.       LABS:      CBC RESULTS:     Recent Labs   Lab 07/14/25  1200 07/12/25  0956   WBC 6.6 7.1   RBC 3.32* 3.33*   HGB 11.5* 11.5*   HCT 34.5* 33.7*    214     BMP RESULTS:  Recent Labs   Lab 07/14/25  1200 07/12/25  0956   * 127*   POTASSIUM 5.3 5.8*   CHLORIDE 105 105   CO2 10* 10*   .7* 101.0*   CR 3.42* 4.43*   * 241*   TRICIA 9.5 9.4     INRNo lab results found in last 7 days.   DIAGNOSTICS:  Personally reviewed

## 2025-07-14 NOTE — TELEPHONE ENCOUNTER
Dr Kathie Ferraro from [facility] stated she has a few questions for coordinator, stated patient was told to go to the ER by Coordinator,patient is in dialysis at [facility] phone number provided is the Dialysis unit phone number, no other details was provided

## 2025-07-14 NOTE — ED NOTES
[facility]  ED Nurse Handoff Report    ED Chief complaint: Abnormal Labs      ED Diagnosis:   Final diagnoses:   Acute renal failure superimposed on stage 4 chronic kidney disease, unspecified acute renal failure type (H)   History of kidney transplant   Uremia   Diarrhea, unspecified type   Dehydration       Code Status: Follow up with admitting team    Allergies: No Known Allergies    Patient Story: Pt to ED because of worsening kidney failure and elevated potassium noted on a blood test on Saturday. The transplant clinic called him and told him he could go to the emergency room and said [facility] was fine for him to go to. He has had no fevers or chills but did vomit this morning at around 430 at home.   Focused Assessment:  Pt A&Ox4. Calm and cooperative. Given zofran for nausea    Treatments and/or interventions provided:   Orders placed or performed during the hospital encounter of 07/14/25    amLODIPine (NORVASC) 10 MG tablet    metoprolol succinate ER (TOPROL XL) 100 MG 24 hr tablet    insulin aspart (NOVOLOG FLEXPEN) 100 UNIT/ML pen    insulin aspart (NOVOLOG FLEXPEN) 100 UNIT/ML pen    insulin aspart (NOVOLOG FLEXPEN) 100 UNIT/ML pen    insulin glargine (LANTUS PEN) 100 UNIT/ML pen    sodium bicarbonate, antacid, POWD powder    busPIRone (BUSPAR) 10 MG tablet    escitalopram (LEXAPRO) 10 MG tablet    niacinamide 500 MG tablet    sodium bicarbonate 150 mEq in sterile water (preservative free) 1,000 mL infusion    ondansetron (ZOFRAN) injection 4 mg      Results for orders placed or performed during the hospital encounter of 07/14/25   Basic metabolic panel     Status: Abnormal   Result Value Ref Range    Sodium 129 (L) 135 - 145 mmol/L    Potassium 5.3 3.4 - 5.3 mmol/L    Chloride 105 98 - 107 mmol/L    Carbon Dioxide (CO2) 10 (LL) 22 - 29 mmol/L    Anion Gap 14 7 - 15 mmol/L    Urea Nitrogen 109.7 (H) 8.0 - 23.0 mg/dL    Creatinine 3.42 (H) 0.51 - 1.17 mg/dL    GFR Estimate 18 (L) >60  "mL/min/1.73m2    Calcium 9.5 8.8 - 10.4 mg/dL    Glucose 198 (H) 70 - 99 mg/dL    Narrative    The generation of reference intervals for this test is currently based on binary male or female sex. If the electronic health record information indicates another gender identity or if Legal Sex is recorded as \"Unknown\", both male and female reference intervals are provided where applicable, and should be considered according to the individual's appropriate clinical context.   CBC with platelets and differential     Status: Abnormal   Result Value Ref Range    WBC Count 6.6 4.0 - 11.0 10e3/uL    RBC Count 3.32 (L) 3.80 - 5.90 10e6/uL    Hemoglobin 11.5 (L) 11.7 - 17.7 g/dL    Hematocrit 34.5 (L) 35.0 - 53.0 %     (H) 78 - 100 fL    MCH 34.6 (H) 26.5 - 33.0 pg    MCHC 33.3 31.5 - 36.5 g/dL    RDW 13.3 10.0 - 15.0 %    Platelet Count 222 150 - 450 10e3/uL    % Neutrophils 73 %    % Lymphocytes 16 %    % Monocytes 10 %    % Eosinophils 0 %    % Basophils 0 %    % Immature Granulocytes 1 %    NRBCs per 100 WBC 0 <1 /100    Absolute Neutrophils 4.8 1.6 - 8.3 10e3/uL    Absolute Lymphocytes 1.1 0.8 - 5.3 10e3/uL    Absolute Monocytes 0.6 0.0 - 1.3 10e3/uL    Absolute Eosinophils 0.0 0.0 - 0.7 10e3/uL    Absolute Basophils 0.0 0.0 - 0.2 10e3/uL    Absolute Immature Granulocytes 0.1 <=0.4 10e3/uL    Absolute NRBCs 0.0 10e3/uL    Narrative    The generation of reference intervals for this test is currently based on binary male or female sex. If the electronic health record information indicates another gender identity or if Legal Sex is recorded as \"Unknown\", both male and female reference intervals are provided where applicable, and should be considered according to the individual's appropriate clinical context.   Lipase     Status: Abnormal   Result Value Ref Range    Lipase 88 (H) 13 - 60 U/L   Ketone Beta-Hydroxybutyrate Quantitative     Status: Abnormal   Result Value Ref Range    Ketone (Beta-Hydroxybutyrate) " "Quantitative 1.44 (H) <=0.30 mmol/L   EKG 12-lead, tracing only     Status: None (Preliminary result)   Result Value Ref Range    Systolic Blood Pressure  mmHg    Diastolic Blood Pressure  mmHg    Ventricular Rate 68 BPM    Atrial Rate 68 BPM    MS Interval 194 ms    QRS Duration 166 ms     ms    QTc 452 ms    P Axis 103 degrees    R AXIS 134 degrees    T Axis 133 degrees    Interpretation ECG       Sinus rhythm  Right bundle branch block  Left posterior fascicular block  ** Bifascicular block **  Abnormal ECG  When compared with ECG of 02-Dec-2004 15:45,  (RBBB and left posterior fascicular block) is now Present     CBC with platelets differential     Status: Abnormal    Narrative    The following orders were created for panel order CBC with platelets differential.  Procedure                               Abnormality         Status                     ---------                               -----------         ------                     CBC with platelets and ...[[id number]]  Abnormal            Final result                 Please view results for these tests on the individual orders.     Patient's response to treatments and/or interventions: Tolerated    To be done/followed up on inpatient unit:  Follow care plan, Stool samples    Does this patient have any cognitive concerns?: n/a    Activity level - Baseline/Home:  Independent  Activity Level - Current:   Stand with Assist    Patient's Preferred language: English   Needed?: No    Isolation: None  Infection: Not Applicable  Sepsis treatment initiated: No  Patient tested for COVID 19 prior to admission: NO  Bariatric?: No    Vital Signs:   Vitals:    07/14/25 0956 07/14/25 1300 07/14/25 1330   BP: 113/56 108/47 113/50   Pulse: 72 64 67   Resp: 17 16 14   Temp: 97.8  F (36.6  C)     TempSrc: Oral     SpO2: 97% 96% 100%   Weight: 78.2 kg (172 lb 8 oz)     Height: 1.651 m (5' 5\")         Cardiac Rhythm:Cardiac Rhythm: Normal sinus rhythm    Was the " PSS-3 completed:   Yes  What interventions are required if any?               Family Comments:   OBS brochure/video discussed/provided to patient/family: No              Name of person given brochure if not patient:               Relationship to patient:     For the majority of the shift this patient's behavior was Green.   Behavioral interventions performed were .    ED NURSE PHONE NUMBER: [phone]

## 2025-07-14 NOTE — PHARMACY-ADMISSION MEDICATION HISTORY
"Pharmacist Admission Medication History    Admission medication history is complete. The information provided in this note is only as accurate as the sources available at the time of the update.    Information Source(s): Patient and CareEverywhere/SureScripts via in-person    Pertinent Information:   Patient had a clinic discharge med list from [facility], but it was quite outdated.   Novolog - pt states he does a rudimentary \"carb counting.\" He doesn't actually count carbs, but starts with the baseline units (listed below) and adjusts the dose slightly depending on how carb-heavy the meal is.   Lisinopril - flagged for review. Pt states yesterday he made the decision to stop taking lisinopril because his BP was low and he felt he was taking too many BP meds.     Changes made to PTA medication list:  Added: Buspar, Lexapro, niacinamide, Lantus  Deleted: Modesto Orellana  Changed: Insulin doses, Norvasc dose.     Allergies reviewed with patient and updates made in EHR: no    Medication History Completed By: Ashleigh Bill [facility] 7/14/2025 11:31 AM    PTA Med List   Medication Sig Last Dose/Taking    amLODIPine (NORVASC) 10 MG tablet Take 10 mg by mouth daily. 7/14/2025 Morning    busPIRone (BUSPAR) 10 MG tablet Take 10 mg by mouth 2 times daily. 7/14/2025 Morning    cycloSPORINE modified (GENERIC EQUIVALENT) 25 MG capsule Take 4 capsules (100 mg) by mouth every morning AND 3 capsules (75 mg) every evening. 7/14/2025 Morning    escitalopram (LEXAPRO) 10 MG tablet Take 10 mg by mouth daily. 7/14/2025 Morning    fluticasone (FLONASE) 50 MCG/ACT spray Spray 1 spray into both nostrils daily as needed for allergies or rhinitis. Past Month    insulin aspart (NOVOLOG FLEXPEN) 100 UNIT/ML pen Inject 5-7 Units subcutaneously daily (with breakfast). 7/14/2025 Morning    insulin aspart (NOVOLOG FLEXPEN) 100 UNIT/ML pen Inject 5 Units subcutaneously daily (with lunch). 7/13/2025 Noon    insulin aspart (NOVOLOG FLEXPEN) 100 UNIT/ML pen " Inject 9 Units subcutaneously daily (with dinner). 7/13/2025 Evening    insulin glargine (LANTUS PEN) 100 UNIT/ML pen Inject 15 Units subcutaneously at bedtime. 7/13/2025 Bedtime    metoprolol succinate ER (TOPROL XL) 100 MG 24 hr tablet Take 100 mg by mouth every evening. 7/13/2025 Evening    mycophenolic acid (GENERIC EQUIVALENT) 360 MG EC tablet Take 1 tablet (360 mg) by mouth 2 times daily. 7/14/2025 Morning    niacinamide 500 MG tablet Take 500 mg by mouth 2 times daily (with meals). 7/14/2025 Morning    sodium bicarbonate, antacid, POWD powder Take 3 g by mouth daily. 0.5 teaspoon of baking soda mixed in liquid once daily 7/14/2025 Morning    vitamin D3 (CHOLECALCIFEROL) 50 mcg (2000 units) tablet Take 1 tablet by mouth daily Past Week      none

## 2025-07-15 LAB
ALBUMIN SERPL BCG-MCNC: 3.6 G/DL (ref 3.5–5.2)
ALBUMIN UR-MCNC: NEGATIVE MG/DL
ANION GAP SERPL CALCULATED.3IONS-SCNC: 15 MMOL/L (ref 7–15)
APPEARANCE UR: CLEAR
BILIRUB UR QL STRIP: NEGATIVE
BUN SERPL-MCNC: 82 MG/DL (ref 8–23)
C CAYETANENSIS DNA STL QL NAA+NON-PROBE: NEGATIVE
C DIFF TOX B STL QL: NEGATIVE
CALCIUM SERPL-MCNC: 9 MG/DL (ref 8.8–10.4)
CAMPYLOBACTER DNA SPEC NAA+PROBE: NEGATIVE
CHLORIDE SERPL-SCNC: 98 MMOL/L (ref 98–107)
CMV DNA SPEC NAA+PROBE-ACNC: NOT DETECTED IU/ML
COLOR UR AUTO: ABNORMAL
CREAT SERPL-MCNC: 2.05 MG/DL (ref 0.51–1.17)
CRYPTOSP DNA STL QL NAA+NON-PROBE: NEGATIVE
CYCLOSPORINE BLD LC/MS/MS-MCNC: 70 UG/L (ref 50–400)
EC STX1+STX2 GENES STL QL NAA+NON-PROBE: NEGATIVE
EGFRCR SERPLBLD CKD-EPI 2021: 34 ML/MIN/1.73M2
ERYTHROCYTE [DISTWIDTH] IN BLOOD BY AUTOMATED COUNT: 13 % (ref 10–15)
EST. AVERAGE GLUCOSE BLD GHB EST-MCNC: 160 MG/DL
G LAMBLIA DNA STL QL NAA+NON-PROBE: NEGATIVE
GLUCOSE BLDC GLUCOMTR-MCNC: 102 MG/DL (ref 70–99)
GLUCOSE BLDC GLUCOMTR-MCNC: 144 MG/DL (ref 70–99)
GLUCOSE BLDC GLUCOMTR-MCNC: 150 MG/DL (ref 70–99)
GLUCOSE BLDC GLUCOMTR-MCNC: 164 MG/DL (ref 70–99)
GLUCOSE BLDC GLUCOMTR-MCNC: 216 MG/DL (ref 70–99)
GLUCOSE SERPL-MCNC: 182 MG/DL (ref 70–99)
GLUCOSE UR STRIP-MCNC: NEGATIVE MG/DL
HBA1C MFR BLD: 7.2 %
HCO3 SERPL-SCNC: 21 MMOL/L (ref 22–29)
HCT VFR BLD AUTO: 28.7 % (ref 35–53)
HGB BLD-MCNC: 10.2 G/DL (ref 11.7–17.7)
HGB UR QL STRIP: NEGATIVE
KETONES UR STRIP-MCNC: NEGATIVE MG/DL
LEUKOCYTE ESTERASE UR QL STRIP: ABNORMAL
MCH RBC QN AUTO: 34.8 PG (ref 26.5–33)
MCHC RBC AUTO-ENTMCNC: 35.5 G/DL (ref 31.5–36.5)
MCV RBC AUTO: 98 FL (ref 78–100)
MUCOUS THREADS #/AREA URNS LPF: PRESENT /LPF
NITRATE UR QL: NEGATIVE
NOROVIRUS GI+II RNA STL QL NAA+NON-PROBE: NEGATIVE
O+P STL MICRO: NEGATIVE
PH UR STRIP: 6 [PH] (ref 5–7)
PHOSPHATE SERPL-MCNC: 2.5 MG/DL (ref 2.5–4.5)
PLATELET # BLD AUTO: 207 10E3/UL (ref 150–450)
POTASSIUM SERPL-SCNC: 3.7 MMOL/L (ref 3.4–5.3)
RBC # BLD AUTO: 2.93 10E6/UL (ref 3.8–5.9)
RBC URINE: 0 /HPF
SALMONELLA SP RPOD STL QL NAA+PROBE: NEGATIVE
SHIGELLA SP+EIEC IPAH ST NAA+NON-PROBE: NEGATIVE
SODIUM SERPL-SCNC: 134 MMOL/L (ref 135–145)
SP GR UR STRIP: 1.01 (ref 1–1.03)
SPECIMEN TYPE: NORMAL
SPECIMEN TYPE: NORMAL
TME LAST DOSE: NORMAL H
TME LAST DOSE: NORMAL H
UROBILINOGEN UR STRIP-MCNC: NORMAL MG/DL
VIBRIO DNA SPEC NAA+PROBE: NEGATIVE
WBC # BLD AUTO: 6.5 10E3/UL (ref 4–11)
WBC URINE: 2 /HPF
Y ENTEROCOL DNA STL QL NAA+PROBE: NEGATIVE

## 2025-07-15 PROCEDURE — 99232 SBSQ HOSP IP/OBS MODERATE 35: CPT | Performed by: INTERNAL MEDICINE

## 2025-07-15 PROCEDURE — 81001 URINALYSIS AUTO W/SCOPE: CPT | Performed by: STUDENT IN AN ORGANIZED HEALTH CARE EDUCATION/TRAINING PROGRAM

## 2025-07-15 PROCEDURE — 120N000001 HC R&B MED SURG/OB

## 2025-07-15 PROCEDURE — 85018 HEMOGLOBIN: CPT | Performed by: STUDENT IN AN ORGANIZED HEALTH CARE EDUCATION/TRAINING PROGRAM

## 2025-07-15 PROCEDURE — 87086 URINE CULTURE/COLONY COUNT: CPT | Performed by: STUDENT IN AN ORGANIZED HEALTH CARE EDUCATION/TRAINING PROGRAM

## 2025-07-15 PROCEDURE — 250N000013 HC RX MED GY IP 250 OP 250 PS 637: Performed by: INTERNAL MEDICINE

## 2025-07-15 PROCEDURE — 250N000013 HC RX MED GY IP 250 OP 250 PS 637: Performed by: STUDENT IN AN ORGANIZED HEALTH CARE EDUCATION/TRAINING PROGRAM

## 2025-07-15 PROCEDURE — 83036 HEMOGLOBIN GLYCOSYLATED A1C: CPT | Performed by: STUDENT IN AN ORGANIZED HEALTH CARE EDUCATION/TRAINING PROGRAM

## 2025-07-15 PROCEDURE — 87506 IADNA-DNA/RNA PROBE TQ 6-11: CPT | Performed by: STUDENT IN AN ORGANIZED HEALTH CARE EDUCATION/TRAINING PROGRAM

## 2025-07-15 PROCEDURE — 250N000009 HC RX 250: Performed by: STUDENT IN AN ORGANIZED HEALTH CARE EDUCATION/TRAINING PROGRAM

## 2025-07-15 PROCEDURE — 258N000003 HC RX IP 258 OP 636: Performed by: STUDENT IN AN ORGANIZED HEALTH CARE EDUCATION/TRAINING PROGRAM

## 2025-07-15 PROCEDURE — 250N000012 HC RX MED GY IP 250 OP 636 PS 637: Performed by: STUDENT IN AN ORGANIZED HEALTH CARE EDUCATION/TRAINING PROGRAM

## 2025-07-15 PROCEDURE — 80069 RENAL FUNCTION PANEL: CPT | Performed by: STUDENT IN AN ORGANIZED HEALTH CARE EDUCATION/TRAINING PROGRAM

## 2025-07-15 PROCEDURE — 36415 COLL VENOUS BLD VENIPUNCTURE: CPT | Performed by: STUDENT IN AN ORGANIZED HEALTH CARE EDUCATION/TRAINING PROGRAM

## 2025-07-15 PROCEDURE — 99232 SBSQ HOSP IP/OBS MODERATE 35: CPT | Performed by: STUDENT IN AN ORGANIZED HEALTH CARE EDUCATION/TRAINING PROGRAM

## 2025-07-15 PROCEDURE — 87493 C DIFF AMPLIFIED PROBE: CPT | Performed by: STUDENT IN AN ORGANIZED HEALTH CARE EDUCATION/TRAINING PROGRAM

## 2025-07-15 PROCEDURE — 87209 SMEAR COMPLEX STAIN: CPT | Performed by: STUDENT IN AN ORGANIZED HEALTH CARE EDUCATION/TRAINING PROGRAM

## 2025-07-15 RX ORDER — SODIUM BICARBONATE 650 MG/1
650 TABLET ORAL 2 TIMES DAILY
Status: DISCONTINUED | OUTPATIENT
Start: 2025-07-15 | End: 2025-07-17 | Stop reason: HOSPADM

## 2025-07-15 RX ORDER — BISACODYL 5 MG
10 TABLET, DELAYED RELEASE (ENTERIC COATED) ORAL ONCE
Status: COMPLETED | OUTPATIENT
Start: 2025-07-15 | End: 2025-07-15

## 2025-07-15 RX ORDER — SODIUM CHLORIDE, SODIUM LACTATE, POTASSIUM CHLORIDE, CALCIUM CHLORIDE 600; 310; 30; 20 MG/100ML; MG/100ML; MG/100ML; MG/100ML
INJECTION, SOLUTION INTRAVENOUS CONTINUOUS
Status: DISCONTINUED | OUTPATIENT
Start: 2025-07-15 | End: 2025-07-16

## 2025-07-15 RX ADMIN — SODIUM CHLORIDE, SODIUM LACTATE, POTASSIUM CHLORIDE, AND CALCIUM CHLORIDE: .6; .31; .03; .02 INJECTION, SOLUTION INTRAVENOUS at 21:46

## 2025-07-15 RX ADMIN — INSULIN GLARGINE 15 UNITS: 100 INJECTION, SOLUTION SUBCUTANEOUS at 21:44

## 2025-07-15 RX ADMIN — SODIUM CHLORIDE, SODIUM LACTATE, POTASSIUM CHLORIDE, AND CALCIUM CHLORIDE: .6; .31; .03; .02 INJECTION, SOLUTION INTRAVENOUS at 13:29

## 2025-07-15 RX ADMIN — METOPROLOL SUCCINATE 100 MG: 100 TABLET, EXTENDED RELEASE ORAL at 20:57

## 2025-07-15 RX ADMIN — BUSPIRONE HYDROCHLORIDE 10 MG: 10 TABLET ORAL at 09:26

## 2025-07-15 RX ADMIN — SODIUM BICARBONATE 650 MG TABLET 650 MG: at 20:57

## 2025-07-15 RX ADMIN — SODIUM BICARBONATE: 84 INJECTION, SOLUTION INTRAVENOUS at 04:40

## 2025-07-15 RX ADMIN — BUSPIRONE HYDROCHLORIDE 10 MG: 10 TABLET ORAL at 20:57

## 2025-07-15 RX ADMIN — SODIUM BICARBONATE: 84 INJECTION, SOLUTION INTRAVENOUS at 11:27

## 2025-07-15 RX ADMIN — BISACODYL 10 MG: 5 TABLET, COATED ORAL at 13:27

## 2025-07-15 RX ADMIN — CYCLOSPORINE 100 MG: 100 CAPSULE, LIQUID FILLED ORAL at 09:26

## 2025-07-15 RX ADMIN — POLYETHYLENE GLYCOL 3350, SODIUM SULFATE ANHYDROUS, SODIUM BICARBONATE, SODIUM CHLORIDE, POTASSIUM CHLORIDE 4000 ML: 236; 22.74; 6.74; 5.86; 2.97 POWDER, FOR SOLUTION ORAL at 18:59

## 2025-07-15 RX ADMIN — MYCOPHENOLIC ACID 360 MG: 360 TABLET, DELAYED RELEASE ORAL at 09:26

## 2025-07-15 RX ADMIN — ESCITALOPRAM OXALATE 10 MG: 10 TABLET ORAL at 09:26

## 2025-07-15 RX ADMIN — SODIUM BICARBONATE 650 MG TABLET 650 MG: at 13:27

## 2025-07-15 RX ADMIN — INSULIN ASPART 1 UNITS: 100 INJECTION, SOLUTION INTRAVENOUS; SUBCUTANEOUS at 21:44

## 2025-07-15 RX ADMIN — INSULIN ASPART 1 UNITS: 100 INJECTION, SOLUTION INTRAVENOUS; SUBCUTANEOUS at 13:28

## 2025-07-15 RX ADMIN — CYCLOSPORINE 75 MG: 50 CAPSULE, LIQUID FILLED ORAL at 20:57

## 2025-07-15 RX ADMIN — MYCOPHENOLIC ACID 360 MG: 360 TABLET, DELAYED RELEASE ORAL at 20:57

## 2025-07-15 ASSESSMENT — ACTIVITIES OF DAILY LIVING (ADL)
ADLS_ACUITY_SCORE: 30
ADLS_ACUITY_SCORE: 29
ADLS_ACUITY_SCORE: 41
ADLS_ACUITY_SCORE: 29
ADLS_ACUITY_SCORE: 30
ADLS_ACUITY_SCORE: 29
ADLS_ACUITY_SCORE: 29
ADLS_ACUITY_SCORE: 18
ADLS_ACUITY_SCORE: 29
ADLS_ACUITY_SCORE: 30
ADLS_ACUITY_SCORE: 30
ADLS_ACUITY_SCORE: 29
ADLS_ACUITY_SCORE: 30
ADLS_ACUITY_SCORE: 29
ADLS_ACUITY_SCORE: 29
ADLS_ACUITY_SCORE: 30
ADLS_ACUITY_SCORE: 29

## 2025-07-15 NOTE — PROGRESS NOTES
"      [facility]             Assessment/Plan:     RADHA on \CKd in the setting of kidney/panc tx: Likely all volume depletion with rapid improvement with IVF's. Baseline is mid 1's and I expect to return to that in the next day or so assuming he can maintain his intake    2. Acidosis Non AG: corrected with bicarbonate. He does need to be on bicarbonate BID but does not require further IV. He remains bladder drained for his panc tx.     3. Diarrhea: recommended a colonoscopy 9/2024 for cancer screening. This is a chronic issue although has not resulted in him needing to be hospitalized previously. In 2023 colonoscopy for biopsies and cmv were recommended but I do not see that it was completed. GI has been consulted    4. Kidney\Panc transplant 1989: previously on azathioprine and frequent skin cancers with MOHs procedures. No melanoma but basel cell and SCC. Requires insulin to maintain glucose levels.       5. Immunosupression: CSA level was 70 and at goal. No changes.     Attestation:   I have reviewed today's relevant vital signs, notes, medications, labs and imaging. 45 min was spent reviewing this data and conveying plan to the team.     Kathie Ferraro MD MS   [facility] [facility]      Interval History:     MrAaron Truong received 2+ L of IVF's yesterday. Stool studies were ordered.  UA had an  alkalotic urine, no WBC's no RBC's. LE was positive. C diff is negative by PCR. Enteric pathogen panel is negative. CMV is negative. tCO2 is markedly improved at 20 today. Creatinine is improved from 3 to 2 and nearing baseline    Pt denies f/c/n/v.  No cp/sob/cough.  No dysuria/hematuria/abd or flank pain.    No dizziness, lightheadedness, headaches, or focal neuro sx.  He does state he continues to have loose stools. \"The runs are back\"           Physical Exam:     Vitals were reviewed     , Blood pressure 122/58, pulse 80, temperature 98.9  F (37.2  C), temperature source Oral, resp. rate 18, " "height 1.651 m (5' 5\"), weight 79.1 kg (174 lb 6.1 oz), SpO2 95%.  Wt Readings from Last 3 Encounters:   07/15/25 79.1 kg (174 lb 6.1 oz)   08/01/23 83.9 kg (185 lb)   04/21/22 89.4 kg (197 lb 1.6 oz)     Intake/Output Summary (Last 24 hours) at 7/15/2025 1126  Last data filed at 7/15/2025 0647  Gross per 24 hour   Intake 2240 ml   Output 700 ml   Net 1540 ml       Gen: up walking in room. NAD  LUngs: clear anterior. No wheezing. No dullness to percussion  Card: regular. Soft systolic murmur  Abd: soft. Normal bowel sounds. No tenderness to palpation. No distention  Ext: no LE edema  Neuro: no asterixis no myoclonus  Skin: too many to count papules, actinic keratoses.          Data:     CBC RESULTS:     Recent Labs   Lab 07/15/25  0640 07/14/25  1200 07/12/25  0956   WBC 6.5 6.6 7.1   RBC 2.93* 3.32* 3.33*   HGB 10.2* 11.5* 11.5*   HCT 28.7* 34.5* 33.7*    222 214     Basic Metabolic Panel:  Recent Labs   Lab 07/15/25  0845 07/15/25  0224 07/14/25  2206 07/14/25  1912 07/14/25  1818 07/14/25  1200 07/12/25  0956   NA  --   --   --   --   --  129* 127*   POTASSIUM  --   --   --   --   --  5.3 5.8*   CHLORIDE  --   --   --   --   --  105 105   CO2  --   --   --   --   --  10* 10*   BUN  --   --   --   --   --  109.7* 101.0*   CR  --   --   --   --   --  3.42* 4.43*   * 150* 142* 149* 144* 198* 241*   TRICIA  --   --   --   --   --  9.5 9.4     INRNo lab results found in last 7 days.                 "

## 2025-07-15 NOTE — PROGRESS NOTES
"CLINICAL NUTRITION SERVICES - ASSESSMENT NOTE    RECOMMENDATIONS FOR MDs/PROVIDERS TO ORDER:  If unable to advance diet in next 48-72 hours, can consider nutrition support     Future/Additional Recommendations:  Nutrition Hx and NFPE as able     REASON FOR ASSESSMENT  Positive admission nutrition risk screen    INFORMATION OBTAINED  Patient not available for interview due to in OR    NUTRITION HISTORY  - Unable to obtain nutrition history     CURRENT NUTRITION ORDERS  Diet:Orders Placed This Encounter      Combination Diet Regular Diet Adult  Snacks/Supplements:       CURRENT INTAKE/TOLERANCE  - Pt was able to order 3 \"meals\" yesterday, although only totaling ~285 kcal and 12 g PRO    SYSTEM FINDINGS  GI symptoms: Acute on chronic non-bloody diarrhea  Skin/wounds: Reviewed    LABS  Nutrition-relevant labs: BUN 55.1 (H), Cr 1.72 (H), Phos 2.2 (L), -216 (H)    MEDICATIONS  Nutrition-relevant medications: High sliding scale insulin q meals and q PM, Lantus 15 units    ANTHROPOMETRICS  Height: 165.1 cm (5' 5\")  Admission Weight: 78.2 kg (172 lb 8 oz) (07/14/25 0956)   Most Recent Weight: 79.1 kg (174 lb 6.1 oz) (07/15/25 0636)  IBW: 61.8 kg (127%)  BMI: Body mass index is 29.02 kg/m .   Weight History:   Wt Readings from Last 10 Encounters:   07/15/25 79.1 kg (174 lb 6.1 oz)   08/01/23 83.9 kg (185 lb)   04/21/22 89.4 kg (197 lb 1.6 oz)   03/18/21 86.3 kg (190 lb 4.8 oz)   05/15/18 87.3 kg (192 lb 6.4 oz)   02/08/17 88.5 kg (195 lb 3.2 oz)     ASSESSED NUTRITION NEEDS  Dosing Weight: 66 kg, based on adjusted wt  Estimated Energy Needs: 8895-2733 kcals/day (25 - 35 kcals/kg)  Justification: CKD  Estimated Protein Needs: 66-86 grams protein/day (1 - 1.3 grams of pro/kg)  Justification: RADHA on CKD4, while hospitalized  Estimated Fluid Needs: 6651-9876 mL/day (1 mL/kcal)  Justification: Per provider pending fluid status     MALNUTRITION  % Intake: Unable to assess  % Weight Loss: Unable to assess   Subcutaneous Fat " Loss: Unable to assess  Muscle Loss: Unable to assess  Fluid Accumulation/Edema: Unable to assess  Malnutrition Diagnosis: Unable to determine due to lack of Hx and NFPE  Malnutrition Present on Admission: Unable to assess    NUTRITION DIAGNOSIS  No nutrition diagnosis at this time     INTERVENTIONS  See nutrition interventions above    GOALS  Diet adv v nutrition support within 2-3 days.     MONITORING/EVALUATION  Progress toward goals will be monitored and evaluated per policy.

## 2025-07-15 NOTE — PROGRESS NOTES
[facility]    Medicine Progress Note - Hospitalist Service    Date of Admission:  7/14/2025    Assessment & Plan      Mathew Vaughan is a 70 year old male with history of ESRD s/p DDKT, T1DM s/p pancreatic transplant, chronic diarrhea who was admitted on 7/14/2025 with RADHA and diarrhea.     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 Presented with acute decline in renal function, NAGMA in setting of persistent diarrhea. Levels at goal. Continues to make urine, remains HDS. Renal function with notable improvement after IVF resuscitation.   -Continue MMF, cyclosporine  -Start mIVF LR 100ml/hr given bowel prep/NPO status   +Stop IV bicarb and start PO supplementation  -Daily renal panel  -Nephrology following    Acute on chronic non-bloody diarrhea  Patient presenting with non-bloody diarrhea without fever. Per review, non-bloody diarrhea does appear to be chronic and intermittent. EBV negative previously. CMV negative, cyclosporine level normal on admission. Stool panel w/ Cdiff negative. Unclear etiology with quite broad differential.  -Follow-up parasite panel  -Continue IVF as above  -EGD, colonoscopy on 7/16, NPO at midnight  -MNGi consulted    T1DM  Hx of pancreatic transplant w/ transplant failure (1989)  Patient w/ type 1 DM (diagnosed at age 7, off insulin after transplant and resumed upon transplant failure). A1c 6.9 in 2/2025. PTA regimen includes lantus 15u + aspart w/ meals.  -Lantus 15u + HDSSI  -Updated A1c  -Hypoglycemia precautions    HTN - Continue metoprolol, holding lisinopril and amlodipine  DANIEL - Continue escitalopram  MGUS - Stable, outpatient follow-up          Diet: Combination Diet Regular Diet Adult    DVT Prophylaxis: Pneumatic Compression Devices and Ambulate every shift  Zelaya Catheter: Not present  Lines: None    "  Cardiac Monitoring: None  Code Status: Full Code      Clinically Significant Risk Factors Present on Admission         # Hyponatremia: Lowest Na = 129 mmol/L in last 2 days, will monitor as appropriate           # Hypertension: Noted on problem list      # Anemia: based on hgb <11       # Overweight: Estimated body mass index is 29.02 kg/m  as calculated from the following:    Height as of this encounter: 1.651 m (5' 5\").    Weight as of this encounter: 79.1 kg (174 lb 6.1 oz).              Social Drivers of Health    Tobacco Use: Medium Risk (6/13/2025)    Received from [facility]    Patient History     Smoking Tobacco Use: Former     Smokeless Tobacco Use: Never          Disposition Plan     Medically Ready for Discharge: Anticipated in 2-4 Days     Alessio Johnson MD  Hospitalist Service  [facility]  Securely message with Mobile Captain (more info)  Text page via [employer] Paging/Directory   ______________________________________________________________________    Interval History   Ongoing diarrhea noted. Denies fever, chills, n/v, abdominal pain. No other concerns. Makes urine. Updated on plan to involve GI.     Physical Exam   Vital Signs: Temp: 98.9  F (37.2  C) Temp src: Oral BP: 122/58 Pulse: 80   Resp: 18 SpO2: 95 % O2 Device: None (Room air)    Weight: 174 lbs 6.14 oz    General: Awake, alert, NAD, appropriate interaction, sitting upright in chair  HEENT: Atraumatic, normocephalic, EOMI, no scleral icterus  CV: RRR, no murmurs, no AILYN, distal pulses intact  Pulm: CTAB, breathing comfortably on RA, no wheezes, no crackles  Abd: Soft, non-tender, non-distended  Skin: No rashes, lesions, or wounds visualized  Neuro: AOx4, CN II-XII grossly intact, no focal deficits, moving all extremities spontaneously, speech normal    Medical Decision Making       45 MINUTES SPENT BY ME on the date of service doing chart review, history, exam, documentation & further " activities per the note.      Data   ------------------------- PAST 24 HR DATA REVIEWED -----------------------------------------------

## 2025-07-15 NOTE — PLAN OF CARE
Summary:  Presented with diarrhea, poor appetite and acute renal failure superimposed on stage 4 CKD. Hx kidney/pancreas transplant.  DATE & TIME: 7/14/25 2030- 7/15/25 0730  Cognitive Concerns/ Orientation: A&O x4   BEHAVIOR & AGGRESSION TOOL COLOR: Green   ABNL VS/O2: VSS RA  MOBILITY: SBA, Ind at baseline; states has had some light headedness at home lately. Up to BR.  PAIN MANAGMENT: Denies pain  DIET: Regular  BOWEL/BLADDER: Continent, BM x1. Stool sample/ urine sample sent.  ABNL LAB/BG: Creatinine 3.42; Na 129; Hgb 11.5  DRAIN/DEVICES: PIV R arm with Sodium Bicarbonate infusing at 150 mL/hr; old AVF to L forearm  TELEMETRY RHYTHM: NA  SKIN: Blanchable redness to R upper back and L lateral foot. Scattered scabs. Old fistula site L forearm, intact.  TESTS/PROCEDURES: None  D/C DAY/GOALS/PLACE: Discharge pending  OTHER IMPORTANT INFO: Enteric precautions maintained, C.diff negative, enteric panel in process. Chronic cough, productive at times, LS clear.

## 2025-07-15 NOTE — PLAN OF CARE
Goal Outcome Evaluation:    Summary:  Presented with diarrhea, poor appetite and acute renal failure superimposed on stage 4 CKD. Hx kidney/pancreas transplant.  DATE & TIME: 7/15/2025 9152-4542  Cognitive Concerns/ Orientation: A&O x4   BEHAVIOR & AGGRESSION TOOL COLOR: Green             ABNL VS/O2: VSS on RA  MOBILITY: SBA  PAIN MANAGMENT: Denies pain  DIET: clear liquids until midnight then NPO  BOWEL/BLADDER: Continent, BM x2. Stool sample needed.   DRAIN/DEVICES: PIV R arm with LR @100 mL/hr.  TELEMETRY RHYTHM: NA  SKIN: Blanchable redness to R upper back and L lateral foot. Scattered scabs. Old fistula site L forearm, intact.  TESTS/PROCEDURES: None  D/C DAY/GOALS/PLACE: Discharge pending  OTHER IMPORTANT INFO: Enteric precautions maintained, enteric panel in process.

## 2025-07-15 NOTE — H&P
[facility]    History and Physical - Hospitalist Service       Date of Admission:  7/14/2025    Assessment & Plan      Mathew Vaughan is a 70 year old adult admitted on 7/14/2025. He presents with abnormal labs.       Acute renal failure superimposed on stage 4 chronic kidney disease, unspecified acute renal failure type (H)    Uremia    History of kidney transplant    Assessment: Presents with abnormal labs with creatinine of 3.42, sodium of 129, ketones of 1.4.  Patient has had persistent diarrhea for the past week.  But has not had any fevers or chills.  No bloody stools.  Suspect that his current acute kidney injury is secondary to prerenal etiology in the setting of poor p.o. intake and diarrhea.  Patient is otherwise hemodynamic stable at this time.  Case was discussed with [facility] felt transfer to [facility] was not needed at this time and patient can be adequately managed here at [facility]    Plan:   - Admit to inpatient  - Continue PTA Mycophenolate / Cyclosporine  - Nephrology consulted  - IV fluids overnight  - Recheck BMP in a.m.  - Follow vitals/temp  - Avoid NSAIDs/nephrotoxins  - Renally dose medications as needed  - Check UA      Pancreas replaced by transplant (H)    Assessment/Plan: Continue to follow with outpatient transplant team      HTN, kidney transplant related    Assessment/Plan: Hold PTA Lisinopril. Continue PTA Norvasc in AM. Continue PTA BB      Dehydration    Diarrhea, unspecified type    Assessment/Plan: CDiff and enteric panel pending      Controlled type 2 diabetes mellitus with complication, with long-term current use of insulin (H)    Assessment/Plan: Continue PTA Lantus 15U at bedtime with high intensity sliding scale insulin as needed       Anxiety state    Assessment/Plan: continue PTA Lexapro      Monoclonal gammopathy of undetermined significance (MGUS)    Anemia in chronic renal disease    Assessment/Plan: CBC stable with Hgb  "11.5 / normal WBC and plts. No evidence of acute blood loss.,            Diet: Combination Diet Regular Diet Adult    DVT Prophylaxis: Pneumatic Compression Devices  Zelaya Catheter: Not present  Lines: None     Cardiac Monitoring: None  Code Status: Full Code      Clinically Significant Risk Factors Present on Admission         # Hyponatremia: Lowest Na = 129 mmol/L in last 2 days, will monitor as appropriate           # Hypertension: Noted on problem list           # Overweight: Estimated body mass index is 28.92 kg/m  as calculated from the following:    Height as of this encounter: 1.651 m (5' 5\").    Weight as of this encounter: 78.8 kg (173 lb 12.8 oz).              Disposition Plan     Medically Ready for Discharge: Anticipated in 2-4 Days           Wilian Brian MD  Hospitalist Service  [facility]  Securely message with [employer] (more info)  Text page via [facility] Paging/Directory     ______________________________________________________________________    Chief Complaint     Abnormal labs  Diarrhea    History is obtained from the patient    History of Present Illness     Mathew Vaughan is a 70 year old adult with past medical history of kidney transplant at the [facility], diabetes, pancreatic transplant, hypertension who presents for evaluation of abnormal labs.      Patient was told by his transplant clinic to be evaluated in the ED today due to his elevated potassium noted on a blood test on Saturday.  He has no fevers or chills but endorses some recent diarrhea and vomiting for the past day.  He has not had any diarrhea today.  Denies any bloody stools.  He has no chest pain or shortness of breath.  He was on a vacation trip in early June and diarrhea started soon after and he was having up to 5 episodes a day of watery stool.  He feels that his diarrhea has slowly improved over the past several weeks.  He endorses a chronic cough due to posterior nasal drainage.  But " otherwise has no hematuria or dysuria.  He has no headaches, sedation, localized weakness or numbness of extremities.  Denies any excessive alcohol intake or  new medications.  He otherwise has no other complaints this time.      Past Medical History    Past Medical History:   Diagnosis Date    Cellulitis of left ear 10/28/2021    Cellulitis of right lower extremity 2021    Diabetes (H)     panc [id number]    H/O kidney transplant     H/O pancreas transplant (H)     Hypertension     Skin cancer     multiple Mohs, seen twice a year       Past Surgical History   Past Surgical History:   Procedure Laterality Date    CATARACT IOL, RT/LT      JOINT REPLACEMENT, HIP RT/LT  , and later    TRANSPLANT PANCREAS, KIDNEY  DONOR, COMBINED         Prior to Admission Medications   Prior to Admission Medications   Prescriptions Last Dose Informant Patient Reported? Taking?   amLODIPine (NORVASC) 10 MG tablet 2025 Morning  Yes Yes   Sig: Take 10 mg by mouth daily.   busPIRone (BUSPAR) 10 MG tablet 2025 Morning  Yes Yes   Sig: Take 10 mg by mouth 2 times daily.   cycloSPORINE modified (GENERIC EQUIVALENT) 25 MG capsule 2025 Morning  No Yes   Sig: Take 4 capsules (100 mg) by mouth every morning AND 3 capsules (75 mg) every evening.   escitalopram (LEXAPRO) 10 MG tablet 2025 Morning  Yes Yes   Sig: Take 10 mg by mouth daily.   fluticasone (FLONASE) 50 MCG/ACT spray Past Month  Yes Yes   Sig: Spray 1 spray into both nostrils daily as needed for allergies or rhinitis.   insulin aspart (NOVOLOG FLEXPEN) 100 UNIT/ML pen 2025 Morning  Yes Yes   Sig: Inject 5-7 Units subcutaneously daily (with breakfast).   insulin aspart (NOVOLOG FLEXPEN) 100 UNIT/ML pen 2025 Noon  Yes Yes   Sig: Inject 5 Units subcutaneously daily (with lunch).   insulin aspart (NOVOLOG FLEXPEN) 100 UNIT/ML pen 2025 Evening  Yes Yes   Sig: Inject 9 Units subcutaneously daily (with dinner).   insulin  glargine (LANTUS PEN) 100 UNIT/ML pen 7/13/2025 Bedtime  Yes Yes   Sig: Inject 15 Units subcutaneously at bedtime.   lisinopril (ZESTRIL) 40 MG tablet   Yes No   Sig: Take 40 mg by mouth daily   metoprolol succinate ER (TOPROL XL) 100 MG 24 hr tablet 7/13/2025 Evening  Yes Yes   Sig: Take 100 mg by mouth every evening.   mycophenolic acid (GENERIC EQUIVALENT) 360 MG EC tablet 7/14/2025 Morning  No Yes   Sig: Take 1 tablet (360 mg) by mouth 2 times daily.   niacinamide 500 MG tablet 7/14/2025 Morning  Yes Yes   Sig: Take 500 mg by mouth 2 times daily (with meals).   sodium bicarbonate, antacid, POWD powder 7/14/2025 Morning  Yes Yes   Sig: Take 3 g by mouth daily. 0.5 teaspoon of baking soda mixed in liquid once daily   vitamin D3 (CHOLECALCIFEROL) 50 mcg (2000 units) tablet Past Week  Yes Yes   Sig: Take 1 tablet by mouth daily      Facility-Administered Medications: None        Review of Systems    The 10 point Review of Systems is negative other than noted in the HPI or here.     Social History   I have reviewed this patient's social history and updated it with pertinent information if needed.  Social History     Tobacco Use    Smoking status: Every Day     Types: Pipe    Smokeless tobacco: Never   Substance Use Topics    Alcohol use: Yes    Drug use: Not Currently         Family History   I have reviewed this patient's family history and updated it with pertinent information if needed.  Family History   Problem Relation Age of Onset    No Known Problems Mother     No Known Problems Father      Allergies   No Known Allergies  ------------------------------------------------------------------------     Physical Exam   Vital Signs: Temp: 98.1  F (36.7  C) Temp src: Oral BP: 135/50 Pulse: 81   Resp: 19 SpO2: 98 % O2 Device: None (Room air)    Weight: 173 lbs 12.8 oz    Constitutional: awake, alert, cooperative, no apparent distress.   Respiratory: CTABL   Cardiovascular: RRR with no m/r/g   GI: Normal bowel sounds,  soft, non-distended, non-tender. Skin: normal skin color, texture, turgor   Musculoskeletal: There is no redness, warmth, or swelling of the joints. Full range of motion noted.   Neurologic: Awake, alert, oriented to name, place and time. Sepideh. Motor is 5 out of 5 bilaterally. Sensory is intact.   Neuropsychiatric: normal mood and affect    ----------------------------------------------------------------------------------------------------------------------------------    Medical Decision Making       75 MINUTES SPENT BY ME on the date of service doing chart review, history, exam, documentation & further activities per the note.      Data   ------------------------- PAST 24 HR DATA REVIEWED -----------------------------------------------    I have personally reviewed the following data over the past 24 hrs:    6.6  \   11.5 (L)   / 222     129 (L) 105 109.7 (H) /  149 (H)   5.3 10 (LL) 3.42 (H) \     ALT: N/A AST: N/A AP: N/A TBILI: N/A   ALB: N/A TOT PROTEIN: N/A LIPASE: 88 (H)       Imaging results reviewed over the past 24 hrs:   No results found for this or any previous visit (from the past 24 hours).  ------------------------- ENCOUNTER LABS ----------------------------------------------------------------  Recent Labs   Lab 07/14/25 1912 07/14/25 1818 07/14/25  1200 07/12/25  0956   WBC  --   --  6.6 7.1   HGB  --   --  11.5* 11.5*   MCV  --   --  104* 101*   PLT  --   --  222 214   NA  --   --  129* 127*   POTASSIUM  --   --  5.3 5.8*   CHLORIDE  --   --  105 105   CO2  --   --  10* 10*   BUN  --   --  109.7* 101.0*   CR  --   --  3.42* 4.43*   ANIONGAP  --   --  14 12   TRICIA  --   --  9.5 9.4   * 144* 198* 241*   LIPASE  --   --  88*  --        Most Recent 3 CBC's:  Recent Labs   Lab Test 07/14/25  1200 07/12/25  0956 09/14/24  1413   WBC 6.6 7.1 6.3   HGB 11.5* 11.5* 12.6   * 101* 101*    214 310     Most Recent 3 BMP's:  Recent Labs   Lab Test 07/14/25 1912 07/14/25  1818  07/14/25  1200 07/12/25  0956 09/14/24  1413   NA  --   --  129* 127* 132*   POTASSIUM  --   --  5.3 5.8* 4.5   CHLORIDE  --   --  105 105 101   CO2  --   --  10* 10* 20*   BUN  --   --  109.7* 101.0* 34.4*   CR  --   --  3.42* 4.43* 1.62*   ANIONGAP  --   --  14 12 11   TRICIA  --   --  9.5 9.4 9.1   * 144* 198* 241* 266*     Most Recent 2 LFT's:No lab results found.  Most Recent 3 INR's:No lab results found.  Most Recent 3 Troponin's:No lab results found.  Most Recent 3 BNP's:No lab results found.  Most Recent D-dimer:No lab results found.  Most Recent Cholesterol Panel:  Recent Labs   Lab Test 09/07/24  1037   CHOL 134   LDL 80   HDL 40   TRIG 69     Most Recent 6 Bacteria Isolates From Any Culture (See EPIC Reports for Culture Details):No lab results found.  Most Recent TSH and T4:No lab results found.  Most Recent Hemoglobin A1c:No lab results found.  Most Recent Urinalysis:No lab results found.  Most Recent ESR & CRP:No lab results found.  Most Recent CPK:No lab results found.

## 2025-07-15 NOTE — CONSULTS
"      [facility]STRO[facility]Y CONSULTATION     Mathew Vaughan  [address]  [place] [address]  70 year old adult    Admission Date/Time: 2025  Primary Care Provider: Rosa Isela, Yaron Sims    We were asked to see the patient in consultation by Dr. Brian for evaluation of diarrhea.        HPI:  Mathew Vaughan is a 70 year old adult who was admitted to [facility] 25 with RADHA, hypotension and diarrhea.    Patient has a history of pancreatic/kidney transplant at the [facility] in .  The pancreas transplant failed and he is a diabetic.  He follows with nephrology for his kidney.    He states he has had unpredictable loose stools for almost 2 years, worse in the past few weeks. Also, frequently has abdominal pain and nausea.  No blood in the stool.  Diarrhea worse after eating.    Infectious work up negative so far.  He has never had an EGD or colonoscopy.    ROS: A comprehensive ten point review of systems was negative aside from those in mentioned in the HPI.      MEDICATIONS:       ALLERGIES: No Known Allergies    Past Medical History:   Diagnosis Date    Cellulitis of left ear 10/28/2021    Cellulitis of right lower extremity 2021    Diabetes (H)     panc [id number]    H/O kidney transplant     H/O pancreas transplant (H)     Hypertension     Skin cancer 2012    multiple Mohs, seen twice a year       Past Surgical History:   Procedure Laterality Date    CATARACT IOL, RT/LT      JOINT REPLACEMENT, HIP RT/LT  , and later    TRANSPLANT PANCREAS, KIDNEY  DONOR, COMBINED           SOCIAL HISTORY:  Social History     Tobacco Use    Smoking status: Every Day     Types: Pipe    Smokeless tobacco: Never   Substance Use Topics    Alcohol use: Yes    Drug use: Not Currently       FAMILY HISTORY:  Non contributory.    PHYSICAL EXAM:   /58 (BP Location: Right arm)   Pulse 80   Temp 98.9  F (37.2  C) (Oral)   Resp 18   Ht 1.651 m (5' 5\")   Wt 79.1 kg (174 lb " 6.1 oz)   SpO2 95%   BMI 29.02 kg/m      Constitutional: NAD, comfortable  Cardiovascular: RRR  Respiratory: CTAB  Psychiatric: mentation appears normal and affect normal/bright  Head: Normocephalic. Atraumatic.    Neck: normal size, trachea midline  Eyes:  no icterus  ENT: hearing adequate  Abdomen: nontender  NEURO: grossly negative  SKIN: no suspicious lesions or rashes            ADDITIONAL COMMENTS:   I reviewed the patient's new clinical lab test results.   Recent Labs   Lab Test 07/15/25  0640 07/14/25  1200 07/12/25  0956   WBC 6.5 6.6 7.1   HGB 10.2* 11.5* 11.5*   MCV 98 104* 101*    222 214     Recent Labs   Lab Test 07/15/25  1105 07/15/25  0845 07/15/25  0224 07/14/25  1818 07/14/25  1200 07/12/25  0956   *  --   --   --  129* 127*   POTASSIUM 3.7  --   --   --  5.3 5.8*   CHLORIDE 98  --   --   --  105 105   CO2 21*  --   --   --  10* 10*   BUN 82.0*  --   --   --  109.7* 101.0*   CR 2.05*  --   --   --  3.42* 4.43*   ANIONGAP 15  --   --   --  14 12   TRICIA 9.0  --   --   --  9.5 9.4   * 102* 150*   < > 198* 241*    < > = values in this interval not displayed.     Recent Labs   Lab Test 07/15/25  1105 07/15/25  0207 07/14/25  1200   ALBUMIN 3.6  --   --    PROTEIN  --  Negative  --    LIPASE  --   --  88*             CONSULTATION ASSESSMENT AND PLAN:    Principal Problem:  Diarrhea  Diarrhea for over a year, worse in past few weeks.  So far CDiff and enteric panel negative, CMV negative.  Parasite test pending.  He is on cyclosporine.   States he thinks he felt better when he was on Azathioprine in the past.       Atypical infection possible, microscopic colitis, inflammatory bowel disease, dumping from diabetes, pancreatic insufficiency.    Plan:  - Colonoscopy with random biopsies  - fecal elastase    Abdominal Pain  Nausea  Assessment: Has had for several years.  Consider gastritis, gastroparesis, ulcer.  Plan:  - EGD    Will plan on EGD/Colonoscopy 7/16/25.  - NPO at midnight  -  prep today        Марина Ledesma MD  [facility]  Office:  [phone]  45 minutes of total time was spent providing patient care, including patient evaluation, reviewing documentation/test results, and .

## 2025-07-16 ENCOUNTER — ANESTHESIA EVENT (OUTPATIENT)
Dept: GASTROENTEROLOGY | Facility: CLINIC | Age: 70
End: 2025-07-16
Payer: COMMERCIAL

## 2025-07-16 ENCOUNTER — ANESTHESIA (OUTPATIENT)
Dept: GASTROENTEROLOGY | Facility: CLINIC | Age: 70
End: 2025-07-16
Payer: COMMERCIAL

## 2025-07-16 LAB
ALBUMIN SERPL BCG-MCNC: 3.8 G/DL (ref 3.5–5.2)
ANION GAP SERPL CALCULATED.3IONS-SCNC: 13 MMOL/L (ref 7–15)
BACTERIA UR CULT: NORMAL
BUN SERPL-MCNC: 55.1 MG/DL (ref 8–23)
CALCIUM SERPL-MCNC: 9.6 MG/DL (ref 8.8–10.4)
CHLORIDE SERPL-SCNC: 104 MMOL/L (ref 98–107)
COLONOSCOPY: NORMAL
CREAT SERPL-MCNC: 1.72 MG/DL (ref 0.51–1.17)
EGFRCR SERPLBLD CKD-EPI 2021: 42 ML/MIN/1.73M2
ERYTHROCYTE [DISTWIDTH] IN BLOOD BY AUTOMATED COUNT: 13.2 % (ref 10–15)
GLUCOSE BLDC GLUCOMTR-MCNC: 112 MG/DL (ref 70–99)
GLUCOSE BLDC GLUCOMTR-MCNC: 185 MG/DL (ref 70–99)
GLUCOSE BLDC GLUCOMTR-MCNC: 199 MG/DL (ref 70–99)
GLUCOSE BLDC GLUCOMTR-MCNC: 245 MG/DL (ref 70–99)
GLUCOSE SERPL-MCNC: 146 MG/DL (ref 70–99)
HCO3 SERPL-SCNC: 23 MMOL/L (ref 22–29)
HCT VFR BLD AUTO: 32.3 % (ref 35–53)
HGB BLD-MCNC: 11 G/DL (ref 11.7–17.7)
MCH RBC QN AUTO: 34.3 PG (ref 26.5–33)
MCHC RBC AUTO-ENTMCNC: 34.1 G/DL (ref 31.5–36.5)
MCV RBC AUTO: 101 FL (ref 78–100)
PHOSPHATE SERPL-MCNC: 2.2 MG/DL (ref 2.5–4.5)
PLATELET # BLD AUTO: 230 10E3/UL (ref 150–450)
POTASSIUM SERPL-SCNC: 3.9 MMOL/L (ref 3.4–5.3)
RBC # BLD AUTO: 3.21 10E6/UL (ref 3.8–5.9)
SODIUM SERPL-SCNC: 140 MMOL/L (ref 135–145)
UPPER GI ENDOSCOPY: NORMAL
WBC # BLD AUTO: 7.3 10E3/UL (ref 4–11)

## 2025-07-16 PROCEDURE — 99232 SBSQ HOSP IP/OBS MODERATE 35: CPT | Performed by: STUDENT IN AN ORGANIZED HEALTH CARE EDUCATION/TRAINING PROGRAM

## 2025-07-16 PROCEDURE — 85018 HEMOGLOBIN: CPT | Performed by: STUDENT IN AN ORGANIZED HEALTH CARE EDUCATION/TRAINING PROGRAM

## 2025-07-16 PROCEDURE — 999N000010 HC STATISTIC ANES STAT CODE-CRNA PER MINUTE: Performed by: INTERNAL MEDICINE

## 2025-07-16 PROCEDURE — 87252 VIRUS INOCULATION TISSUE: CPT | Performed by: STUDENT IN AN ORGANIZED HEALTH CARE EDUCATION/TRAINING PROGRAM

## 2025-07-16 PROCEDURE — 370N000017 HC ANESTHESIA TECHNICAL FEE, PER MIN: Performed by: INTERNAL MEDICINE

## 2025-07-16 PROCEDURE — 250N000013 HC RX MED GY IP 250 OP 250 PS 637: Performed by: STUDENT IN AN ORGANIZED HEALTH CARE EDUCATION/TRAINING PROGRAM

## 2025-07-16 PROCEDURE — 36415 COLL VENOUS BLD VENIPUNCTURE: CPT | Performed by: STUDENT IN AN ORGANIZED HEALTH CARE EDUCATION/TRAINING PROGRAM

## 2025-07-16 PROCEDURE — 250N000012 HC RX MED GY IP 250 OP 636 PS 637: Performed by: STUDENT IN AN ORGANIZED HEALTH CARE EDUCATION/TRAINING PROGRAM

## 2025-07-16 PROCEDURE — 88305 TISSUE EXAM BY PATHOLOGIST: CPT | Mod: 26 | Performed by: PATHOLOGY

## 2025-07-16 PROCEDURE — 82653 EL-1 FECAL QUANTITATIVE: CPT | Performed by: INTERNAL MEDICINE

## 2025-07-16 PROCEDURE — 43239 EGD BIOPSY SINGLE/MULTIPLE: CPT | Performed by: INTERNAL MEDICINE

## 2025-07-16 PROCEDURE — 250N000013 HC RX MED GY IP 250 OP 250 PS 637: Performed by: INTERNAL MEDICINE

## 2025-07-16 PROCEDURE — 88305 TISSUE EXAM BY PATHOLOGIST: CPT | Mod: TC | Performed by: INTERNAL MEDICINE

## 2025-07-16 PROCEDURE — 0DBE8ZX EXCISION OF LARGE INTESTINE, VIA NATURAL OR ARTIFICIAL OPENING ENDOSCOPIC, DIAGNOSTIC: ICD-10-PCS | Performed by: INTERNAL MEDICINE

## 2025-07-16 PROCEDURE — 0DB98ZX EXCISION OF DUODENUM, VIA NATURAL OR ARTIFICIAL OPENING ENDOSCOPIC, DIAGNOSTIC: ICD-10-PCS | Performed by: INTERNAL MEDICINE

## 2025-07-16 PROCEDURE — 0DB78ZX EXCISION OF STOMACH, PYLORUS, VIA NATURAL OR ARTIFICIAL OPENING ENDOSCOPIC, DIAGNOSTIC: ICD-10-PCS | Performed by: INTERNAL MEDICINE

## 2025-07-16 PROCEDURE — 120N000001 HC R&B MED SURG/OB

## 2025-07-16 PROCEDURE — 250N000011 HC RX IP 250 OP 636

## 2025-07-16 PROCEDURE — 258N000003 HC RX IP 258 OP 636

## 2025-07-16 PROCEDURE — 45380 COLONOSCOPY AND BIOPSY: CPT | Performed by: INTERNAL MEDICINE

## 2025-07-16 PROCEDURE — 82040 ASSAY OF SERUM ALBUMIN: CPT | Performed by: STUDENT IN AN ORGANIZED HEALTH CARE EDUCATION/TRAINING PROGRAM

## 2025-07-16 RX ORDER — ONDANSETRON 2 MG/ML
4 INJECTION INTRAMUSCULAR; INTRAVENOUS
Status: DISCONTINUED | OUTPATIENT
Start: 2025-07-16 | End: 2025-07-16 | Stop reason: HOSPADM

## 2025-07-16 RX ORDER — LOPERAMIDE HYDROCHLORIDE 2 MG/1
2 CAPSULE ORAL 4 TIMES DAILY PRN
Status: DISCONTINUED | OUTPATIENT
Start: 2025-07-16 | End: 2025-07-17 | Stop reason: HOSPADM

## 2025-07-16 RX ORDER — PANTOPRAZOLE SODIUM 40 MG/1
40 TABLET, DELAYED RELEASE ORAL
Status: DISCONTINUED | OUTPATIENT
Start: 2025-07-16 | End: 2025-07-17 | Stop reason: HOSPADM

## 2025-07-16 RX ORDER — AMLODIPINE BESYLATE 10 MG/1
10 TABLET ORAL DAILY
Status: DISCONTINUED | OUTPATIENT
Start: 2025-07-16 | End: 2025-07-17 | Stop reason: HOSPADM

## 2025-07-16 RX ORDER — PROPOFOL 10 MG/ML
INJECTION, EMULSION INTRAVENOUS PRN
Status: DISCONTINUED | OUTPATIENT
Start: 2025-07-16 | End: 2025-07-16

## 2025-07-16 RX ORDER — SODIUM CHLORIDE, SODIUM LACTATE, POTASSIUM CHLORIDE, CALCIUM CHLORIDE 600; 310; 30; 20 MG/100ML; MG/100ML; MG/100ML; MG/100ML
INJECTION, SOLUTION INTRAVENOUS CONTINUOUS PRN
Status: DISCONTINUED | OUTPATIENT
Start: 2025-07-16 | End: 2025-07-16

## 2025-07-16 RX ORDER — PROPOFOL 10 MG/ML
INJECTION, EMULSION INTRAVENOUS CONTINUOUS PRN
Status: DISCONTINUED | OUTPATIENT
Start: 2025-07-16 | End: 2025-07-16

## 2025-07-16 RX ADMIN — SODIUM BICARBONATE 650 MG TABLET 650 MG: at 11:32

## 2025-07-16 RX ADMIN — SODIUM BICARBONATE 650 MG TABLET 650 MG: at 21:26

## 2025-07-16 RX ADMIN — PROPOFOL 150 MCG/KG/MIN: 10 INJECTION, EMULSION INTRAVENOUS at 08:47

## 2025-07-16 RX ADMIN — Medication 500 MG: at 12:41

## 2025-07-16 RX ADMIN — INSULIN ASPART 2 UNITS: 100 INJECTION, SOLUTION INTRAVENOUS; SUBCUTANEOUS at 18:19

## 2025-07-16 RX ADMIN — INSULIN GLARGINE 15 UNITS: 100 INJECTION, SOLUTION SUBCUTANEOUS at 21:34

## 2025-07-16 RX ADMIN — CYCLOSPORINE 100 MG: 100 CAPSULE, LIQUID FILLED ORAL at 11:31

## 2025-07-16 RX ADMIN — POTASSIUM & SODIUM PHOSPHATES POWDER PACK 280-160-250 MG 1 PACKET: 280-160-250 PACK at 23:52

## 2025-07-16 RX ADMIN — CYCLOSPORINE 75 MG: 50 CAPSULE, LIQUID FILLED ORAL at 21:26

## 2025-07-16 RX ADMIN — POTASSIUM & SODIUM PHOSPHATES POWDER PACK 280-160-250 MG 1 PACKET: 280-160-250 PACK at 16:32

## 2025-07-16 RX ADMIN — BUSPIRONE HYDROCHLORIDE 10 MG: 10 TABLET ORAL at 21:25

## 2025-07-16 RX ADMIN — METOPROLOL SUCCINATE 100 MG: 100 TABLET, EXTENDED RELEASE ORAL at 21:26

## 2025-07-16 RX ADMIN — PANTOPRAZOLE SODIUM 40 MG: 40 TABLET, DELAYED RELEASE ORAL at 12:41

## 2025-07-16 RX ADMIN — SODIUM CHLORIDE, SODIUM LACTATE, POTASSIUM CHLORIDE, AND CALCIUM CHLORIDE: .6; .31; .03; .02 INJECTION, SOLUTION INTRAVENOUS at 08:47

## 2025-07-16 RX ADMIN — PROPOFOL 100 MG: 10 INJECTION, EMULSION INTRAVENOUS at 08:47

## 2025-07-16 RX ADMIN — AMLODIPINE BESYLATE 10 MG: 10 TABLET ORAL at 11:30

## 2025-07-16 RX ADMIN — CALCIUM CARBONATE (ANTACID) CHEW TAB 500 MG 1000 MG: 500 CHEW TAB at 11:32

## 2025-07-16 RX ADMIN — INSULIN ASPART 2 UNITS: 100 INJECTION, SOLUTION INTRAVENOUS; SUBCUTANEOUS at 21:44

## 2025-07-16 RX ADMIN — ESCITALOPRAM OXALATE 10 MG: 10 TABLET ORAL at 11:33

## 2025-07-16 RX ADMIN — Medication 500 MG: at 18:19

## 2025-07-16 RX ADMIN — MYCOPHENOLIC ACID 360 MG: 360 TABLET, DELAYED RELEASE ORAL at 11:33

## 2025-07-16 RX ADMIN — MYCOPHENOLIC ACID 360 MG: 360 TABLET, DELAYED RELEASE ORAL at 21:25

## 2025-07-16 RX ADMIN — POTASSIUM & SODIUM PHOSPHATES POWDER PACK 280-160-250 MG 1 PACKET: 280-160-250 PACK at 21:26

## 2025-07-16 RX ADMIN — BUSPIRONE HYDROCHLORIDE 10 MG: 10 TABLET ORAL at 11:30

## 2025-07-16 ASSESSMENT — ACTIVITIES OF DAILY LIVING (ADL)
ADLS_ACUITY_SCORE: 29

## 2025-07-16 NOTE — ANESTHESIA POSTPROCEDURE EVALUATION
Patient: Mathew Vaughan    Procedure: Procedure(s):  ESOPHAGOGASTRODUODENOSCOPY, WITH BIOPSY  COLONOSCOPY, WITH  BIOPSY       Anesthesia Type:  MAC    Note:     Postop Pain Control: Uneventful            Sign Out: Well controlled pain   PONV: No   Neuro/Psych: Uneventful            Sign Out: Acceptable/Baseline neuro status   Airway/Respiratory: Uneventful            Sign Out: Acceptable/Baseline resp. status   CV/Hemodynamics: Uneventful            Sign Out: Acceptable CV status; No obvious hypovolemia; No obvious fluid overload   Other NRE: NONE   DID A NON-ROUTINE EVENT OCCUR? No           Last vitals:  Vitals Value Taken Time   /62 07/16/25 09:40   Temp     Pulse 60 07/16/25 10:17   Resp 29 07/16/25 10:18   SpO2 69 % 07/16/25 09:43   Vitals shown include unfiled device data.    Electronically Signed By: Maxwell Whitmore MD  July 16, 2025  12:20 PM

## 2025-07-16 NOTE — PROGRESS NOTES
DATE & TIME: 7/16/25/ -  Cognitive Concerns/ Orientation: A&O x4   BEHAVIOR & AGGRESSION TOOL COLOR: Green             ABNL VS/O2: VSS RA  MOBILITY: Ind, ambulating to BR   PAIN MANAGMENT: Denies pain  DIET: carb diet  BOWEL/BLADDER: Continent, bowel prep complete at 2300,  BMs clear yellow.  Unable to obtain a stool sample, urine/stool mix.  ABNL LAB/BG:  and 112  DRAIN/DEVICES: PIV R arm saline locked.  TELEMETRY RHYTHM: NA  SKIN: Blanchable redness to R upper back and L lateral foot. Scattered scabs. Old fistula site L forearm, skin intact.  TESTS/PROCEDURES: EGD/ colonoscopy today 7/16 at 0935.  D/C DAY/GOALS/PLACE: Discharge pending  OTHER IMPORTANT INFO: Enteric precautions maintained, Elastase fecal sample ordered, stool sample needed. Chronic cough, productive at times, LS clear.

## 2025-07-16 NOTE — PROGRESS NOTES
[facility] Brief GI Note    EGD and Colonoscopy completed.    Findings:  - mild gastritis and duodenitis, biopsies taken  - normal appearing colon, biopsies taken, no evidence of inflammatory bowel disease    Plan:  - resume previous diet  - await fecal elastase (this could be pancreatic insufficiency and he would benefit from Creon)  - await biopsies for H. Pylori, celiac, microscopic colitis  - if biopsies and fecal elastase negative outpatient work up would include gastric emptying study    We will arrange outpatient GI clinic follow up.  I will call him if the pathology results come back positive prior to his GI appointment.    Марина Ledesma MD  [facility]  [phone]

## 2025-07-16 NOTE — PROGRESS NOTES
PRE-PROCEDURE NOTE    CHIEF COMPLAINT / REASON FOR PROCEDURE:  diarrhea, nausea    History and Physical Reviewed:  yes    PRE-SEDATION ASSESSMENT:    Lung Exam:  normal  Heart Exam:  rrr  Mallampati Airway Classification:  2  Previous reaction to anesthesia/sedation:   no  Sedation plan based on assessment:  MAC  Comment(s):  risks of the procedure explained  ASA Classification:  4    IMPRESSION:  rule out gastritis, celiac, microscopic colitis    PLAN:  egd/colon       Марина Ledesma MD, MD

## 2025-07-16 NOTE — ANESTHESIA PREPROCEDURE EVALUATION
Anesthesia Pre-Procedure Evaluation    Patient: Mathew Vaughan   MRN: [id number] : 1955          Procedure : Procedure(s):  Esophagoscopy, gastroscopy, duodenoscopy (EGD), combined  Colonoscopy         Past Medical History:   Diagnosis Date    Cellulitis of left ear 10/28/2021    Cellulitis of right lower extremity 2021    Diabetes (H) 1962    panc [id number]    H/O kidney transplant     H/O pancreas transplant (H)     Hypertension     Skin cancer     multiple Mohs, seen twice a year      Past Surgical History:   Procedure Laterality Date    CATARACT IOL, RT/LT      JOINT REPLACEMENT, HIP RT/LT  , and later    TRANSPLANT PANCREAS, KIDNEY  DONOR, COMBINED        No Known Allergies   Social History     Tobacco Use    Smoking status: Every Day     Types: Pipe    Smokeless tobacco: Never   Substance Use Topics    Alcohol use: Yes      Wt Readings from Last 1 Encounters:   25 78.2 kg (172 lb 4.8 oz)        Anesthesia Evaluation   Pt has had prior anesthetic.     No history of anesthetic complications       ROS/MED HX  ENT/Pulmonary:    (-) sleep apnea   Neurologic:       Cardiovascular:     (+)  hypertension- -   -  - -                                      METS/Exercise Tolerance:     Hematologic: Comments: MGUS      Musculoskeletal:       GI/Hepatic: Comment: Chronic diarrhea   (-) GERD   Renal/Genitourinary:     (+) renal disease (sp transplant.  Stage 4 CRI currently, admitted with worsening renal failure but recovered with hydration), type: CRI,            Endo:     (+)  type II DM,   Using insulin,                 Psychiatric/Substance Use:     (+) psychiatric history anxiety       Infectious Disease:       Malignancy:       Other:              Physical Exam  Airway  Mallampati: III  TM distance: >3 FB  Neck ROM: full  Mouth opening: >= 4 cm    Cardiovascular - normal exam   Dental   (+) Modest Abnormalities - crowns, retainers, 1 or 2 missing teeth      Pulmonary -  "normal exam      Neurological   Other Findings       OUTSIDE LABS:  CBC:   Lab Results   Component Value Date    WBC 7.3 07/16/2025    WBC 6.5 07/15/2025    HGB 11.0 (L) 07/16/2025    HGB 10.2 (L) 07/15/2025    HCT 32.3 (L) 07/16/2025    HCT 28.7 (L) 07/15/2025     07/16/2025     07/15/2025     BMP:   Lab Results   Component Value Date     07/16/2025     (L) 07/15/2025    POTASSIUM 3.9 07/16/2025    POTASSIUM 3.7 07/15/2025    CHLORIDE 104 07/16/2025    CHLORIDE 98 07/15/2025    CO2 23 07/16/2025    CO2 21 (L) 07/15/2025    BUN 55.1 (H) 07/16/2025    BUN 82.0 (H) 07/15/2025    CR 1.72 (H) 07/16/2025    CR 2.05 (H) 07/15/2025     (H) 07/16/2025     (H) 07/16/2025     COAGS: No results found for: \"PTT\", \"INR\", \"FIBR\"  POC: No results found for: \"BGM\", \"HCG\", \"HCGS\"  HEPATIC:   Lab Results   Component Value Date    ALBUMIN 3.8 07/16/2025     OTHER:   Lab Results   Component Value Date    A1C 7.2 (H) 07/15/2025    TRICIA 9.6 07/16/2025    PHOS 2.2 (L) 07/16/2025    LIPASE 88 (H) 07/14/2025    AMYLASE 85 01/11/2017       Anesthesia Plan    ASA Status:  4      NPO Status: NPO Appropriate   Anesthesia Type: MAC.   Techniques and Equipment:       - Monitoring Plan: standard ASA monitoring     Consents    Anesthesia Plan(s) and associated risks, benefits, and realistic alternatives discussed. Questions answered and patient/representative(s) expressed understanding.     - Discussed:     - Discussed with:  Patient               Postoperative Care         Comments:                   Maxwell Whitmore MD    I have reviewed the pertinent notes and labs in the chart from the past 30 days and (re)examined the patient.  Any updates or changes from those notes are reflected in this note.    Clinically Significant Risk Factors         # Hyponatremia: Lowest Na = 129 mmol/L in last 2 days, will monitor as appropriate           # Hypertension: Noted on problem list           # DMII: A1C = 7.2 % " "(Ref range: <5.7 %) within past 6 months   # Overweight: Estimated body mass index is 28.67 kg/m  as calculated from the following:    Height as of this encounter: 1.651 m (5' 5\").    Weight as of this encounter: 78.2 kg (172 lb 4.8 oz)., PRESENT ON ADMISSION                  "

## 2025-07-16 NOTE — PROGRESS NOTES
[facility]    Medicine Progress Note - Hospitalist Service    Date of Admission:  7/14/2025    Assessment & Plan   Mathew Vaughan is a 70 year old male with history of ESRD s/p DDKT, T1DM s/p pancreatic transplant, chronic diarrhea who was admitted on 7/14/2025 with RADHA and diarrhea.     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 Presented with acute decline in renal function, NAGMA in setting of persistent diarrhea. Levels at goal. Continues to make urine, remains HDS. Renal function with notable improvement after IVF resuscitation.   -Continue MMF, cyclosporine  -Continue bicarb BID, resume PTA baking soda on discharge  -Daily renal panel  -Nephrology following    Acute on chronic non-bloody diarrhea  Colonic diverticulosis  Patient presenting with non-bloody diarrhea without fever. Per review, non-bloody diarrhea does appear to be chronic and intermittent. EBV negative previously. CMV negative, cyclosporine level normal on admission. Stool panel w/ Cdiff negative, parasite negative. Upper/lower endoscopy performed 7/16 with evidence of gastritis, duodenitis, and diverticulosis without clear cause of diarrhea. Unclear etiology with quite broad differential; notable concern for pancreatic insufficiency given chronicity in setting of failed transplant.  -Encourage PO intake  -Follow-up biopsies, fecal elastase   +Add-on CMV to colonic tissue  -[facility] consulted    T1DM  Hx of pancreatic transplant w/ transplant failure (1989)  Patient w/ type 1 DM (diagnosed at age 7, off insulin after transplant and resumed upon transplant failure). A1c 6.9 in 2/2025. PTA regimen includes lantus 15u + aspart w/ meals.  -Lantus 15u + HDSSI  -Moderate CHO diet  -Hypoglycemia precautions    Gastritis, duodenitis - Start PPI  HTN - Continue metoprolol and amlodipine,  "holding lisinopril  DANIEL - Continue escitalopram  MGUS - Stable, outpatient follow-up          Diet: Moderate Consistent Carb (60 g CHO per Meal) Diet    DVT Prophylaxis: Pneumatic Compression Devices and Ambulate every shift  Zelaya Catheter: Not present  Lines: None     Cardiac Monitoring: None  Code Status: Full Code      Clinically Significant Risk Factors         # Hyponatremia: Lowest Na = 134 mmol/L in last 2 days, will monitor as appropriate           # Hypertension: Noted on problem list           # DMII: A1C = 7.2 % (Ref range: <5.7 %) within past 6 months   # Overweight: Estimated body mass index is 28.62 kg/m  as calculated from the following:    Height as of this encounter: 1.651 m (5' 5\").    Weight as of this encounter: 78 kg (172 lb)., PRESENT ON ADMISSION            Social Drivers of Health    Tobacco Use: High Risk (7/16/2025)    Patient History     Smoking Tobacco Use: Every Day     Smokeless Tobacco Use: Never          Disposition Plan   Medically Ready for Discharge: Anticipated Tomorrow     Alessio Johnson MD  Hospitalist Service  [facility]  Securely message with [employer] (more info)  Text page via [employer] Paging/Directory   ______________________________________________________________________    Interval History   No concerns. Upper/lower endoscopy done this morning. Updated family at bedside. Encouraged PO intake. Denies fever, chills, n/v.    Physical Exam   Vital Signs: Temp: 99  F (37.2  C) Temp src: Oral BP: 131/62 Pulse: 65   Resp: 20 SpO2: 92 % O2 Device: None (Room air)    Weight: 172 lbs 0 oz    General: Awake, alert, NAD, appropriate interaction, sitting upright in chair  HEENT: Atraumatic, normocephalic, EOMI, no scleral icterus  CV: RRR, no murmurs, no AILYN, distal pulses intact  Pulm: CTAB, breathing comfortably on RA, no wheezes, no crackles  Abd: Soft, non-tender, non-distended  Skin: No rashes, lesions, or wounds visualized  Neuro: AOx4, CN II-XII grossly " intact, no focal deficits, moving all extremities spontaneously, speech normal    Medical Decision Making       45 MINUTES SPENT BY ME on the date of service doing chart review, history, exam, documentation & further activities per the note.      Data   ------------------------- PAST 24 HR DATA REVIEWED -----------------------------------------------

## 2025-07-16 NOTE — PLAN OF CARE
Goal Outcome Evaluation: Progressing    Reason for Admission: CKD    Evaluation of Goal:   Patient is A&Ox 4. Vitals are stable on RA. Patient is up independently in the room. Adequate urine output and one bowel movement. Patient scoring green on the Aggression Stoplight Tool. Pt calm and cooperative with cares, able to make needs known, call light within reach, bed alarm on for safety. Discharge disposition is pending.     Velia Summers RN on 7/16/2025 at 6:55 PM

## 2025-07-16 NOTE — PLAN OF CARE
DATE & TIME: 7/15/25/ 1900- 7/16/25 0730  Cognitive Concerns/ Orientation: A&O x4   BEHAVIOR & AGGRESSION TOOL COLOR: Green   ABNL VS/O2: VSS RA  MOBILITY: Ind, ambulating to BR   PAIN MANAGMENT: Denies pain  DIET: NPO since midnight  BOWEL/BLADDER: Continent, bowel prep complete at 2300,  BMs clear yellow.  Unable to obtain a stool sample, urine/stool mix.  ABNL LAB/BG:  and 199  DRAIN/DEVICES: PIV R arm saline locked.  TELEMETRY RHYTHM: NA  SKIN: Blanchable redness to R upper back and L lateral foot. Scattered scabs. Old fistula site L forearm, skin intact.  TESTS/PROCEDURES: EGD/ colonoscopy today 7/16 at 0935.  D/C DAY/GOALS/PLACE: Discharge pending  OTHER IMPORTANT INFO: Enteric precautions maintained, Elastase fecal sample ordered, stool sample needed. Chronic cough, productive at times, LS clear.

## 2025-07-17 ENCOUNTER — RESULTS FOLLOW-UP (OUTPATIENT)
Dept: TRANSPLANT | Facility: CLINIC | Age: 70
End: 2025-07-17
Payer: COMMERCIAL

## 2025-07-17 VITALS
DIASTOLIC BLOOD PRESSURE: 67 MMHG | WEIGHT: 172 LBS | BODY MASS INDEX: 28.66 KG/M2 | HEIGHT: 65 IN | TEMPERATURE: 99 F | RESPIRATION RATE: 16 BRPM | OXYGEN SATURATION: 98 % | SYSTOLIC BLOOD PRESSURE: 142 MMHG | HEART RATE: 69 BPM

## 2025-07-17 DIAGNOSIS — I15.1 HTN, KIDNEY TRANSPLANT RELATED: ICD-10-CM

## 2025-07-17 DIAGNOSIS — Z94.0 HTN, KIDNEY TRANSPLANT RELATED: ICD-10-CM

## 2025-07-17 LAB
ANION GAP SERPL CALCULATED.3IONS-SCNC: 16 MMOL/L (ref 7–15)
BUN SERPL-MCNC: 38.6 MG/DL (ref 8–23)
CALCIUM SERPL-MCNC: 9.4 MG/DL (ref 8.8–10.4)
CHLORIDE SERPL-SCNC: 101 MMOL/L (ref 98–107)
CREAT SERPL-MCNC: 1.76 MG/DL (ref 0.51–1.17)
EGFRCR SERPLBLD CKD-EPI 2021: 41 ML/MIN/1.73M2
GLUCOSE BLDC GLUCOMTR-MCNC: 118 MG/DL (ref 70–99)
GLUCOSE BLDC GLUCOMTR-MCNC: 77 MG/DL (ref 70–99)
GLUCOSE BLDC GLUCOMTR-MCNC: 91 MG/DL (ref 70–99)
GLUCOSE SERPL-MCNC: 72 MG/DL (ref 70–99)
HCO3 SERPL-SCNC: 22 MMOL/L (ref 22–29)
HOLD SPECIMEN: NORMAL
PATH REPORT.COMMENTS IMP SPEC: NORMAL
PATH REPORT.COMMENTS IMP SPEC: NORMAL
PATH REPORT.FINAL DX SPEC: NORMAL
PATH REPORT.GROSS SPEC: NORMAL
PATH REPORT.MICROSCOPIC SPEC OTHER STN: NORMAL
PATH REPORT.RELEVANT HX SPEC: NORMAL
PHOSPHATE SERPL-MCNC: 2.4 MG/DL (ref 2.5–4.5)
PHOTO IMAGE: NORMAL
POTASSIUM SERPL-SCNC: 3.9 MMOL/L (ref 3.4–5.3)
SODIUM SERPL-SCNC: 139 MMOL/L (ref 135–145)

## 2025-07-17 PROCEDURE — 250N000012 HC RX MED GY IP 250 OP 636 PS 637: Performed by: STUDENT IN AN ORGANIZED HEALTH CARE EDUCATION/TRAINING PROGRAM

## 2025-07-17 PROCEDURE — 80048 BASIC METABOLIC PNL TOTAL CA: CPT | Performed by: STUDENT IN AN ORGANIZED HEALTH CARE EDUCATION/TRAINING PROGRAM

## 2025-07-17 PROCEDURE — 84100 ASSAY OF PHOSPHORUS: CPT | Performed by: STUDENT IN AN ORGANIZED HEALTH CARE EDUCATION/TRAINING PROGRAM

## 2025-07-17 PROCEDURE — 250N000013 HC RX MED GY IP 250 OP 250 PS 637: Performed by: INTERNAL MEDICINE

## 2025-07-17 PROCEDURE — 250N000013 HC RX MED GY IP 250 OP 250 PS 637: Performed by: STUDENT IN AN ORGANIZED HEALTH CARE EDUCATION/TRAINING PROGRAM

## 2025-07-17 PROCEDURE — 36415 COLL VENOUS BLD VENIPUNCTURE: CPT | Performed by: STUDENT IN AN ORGANIZED HEALTH CARE EDUCATION/TRAINING PROGRAM

## 2025-07-17 PROCEDURE — 99239 HOSP IP/OBS DSCHRG MGMT >30: CPT | Performed by: STUDENT IN AN ORGANIZED HEALTH CARE EDUCATION/TRAINING PROGRAM

## 2025-07-17 RX ORDER — PANTOPRAZOLE SODIUM 40 MG/1
40 TABLET, DELAYED RELEASE ORAL
Qty: 60 TABLET | Refills: 1 | Status: SHIPPED | OUTPATIENT
Start: 2025-07-18

## 2025-07-17 RX ADMIN — SODIUM BICARBONATE 650 MG TABLET 650 MG: at 08:51

## 2025-07-17 RX ADMIN — PANTOPRAZOLE SODIUM 40 MG: 40 TABLET, DELAYED RELEASE ORAL at 06:45

## 2025-07-17 RX ADMIN — Medication 500 MG: at 08:52

## 2025-07-17 RX ADMIN — BUSPIRONE HYDROCHLORIDE 10 MG: 10 TABLET ORAL at 08:51

## 2025-07-17 RX ADMIN — AMLODIPINE BESYLATE 10 MG: 10 TABLET ORAL at 08:51

## 2025-07-17 RX ADMIN — POTASSIUM & SODIUM PHOSPHATES POWDER PACK 280-160-250 MG 1 PACKET: 280-160-250 PACK at 08:58

## 2025-07-17 RX ADMIN — ESCITALOPRAM OXALATE 10 MG: 10 TABLET ORAL at 08:50

## 2025-07-17 RX ADMIN — CYCLOSPORINE 100 MG: 100 CAPSULE, LIQUID FILLED ORAL at 08:51

## 2025-07-17 RX ADMIN — MYCOPHENOLIC ACID 360 MG: 360 TABLET, DELAYED RELEASE ORAL at 08:50

## 2025-07-17 ASSESSMENT — ACTIVITIES OF DAILY LIVING (ADL)
ADLS_ACUITY_SCORE: 29
ADLS_ACUITY_SCORE: 29
ADLS_ACUITY_SCORE: 35
ADLS_ACUITY_SCORE: 29
ADLS_ACUITY_SCORE: 35
ADLS_ACUITY_SCORE: 29
ADLS_ACUITY_SCORE: 35
DEPENDENT_IADLS:: INDEPENDENT
ADLS_ACUITY_SCORE: 29

## 2025-07-17 NOTE — PLAN OF CARE
Goal Outcome Evaluation:       DATE & TIME: 7/16/25/-7/17/25 [id number]    Cognitive Concerns/ Orientation: A&O x4   BEHAVIOR & AGGRESSION TOOL COLOR: Green   ABNL VS/O2: VSS RA  MOBILITY: Ind, ambulating to BR   PAIN MANAGMENT: Denies pain  DIET: Mod carb  BOWEL/BLADDER: Continent  ABNL LAB/BG: , phos 2.2, Cr 1.72, Urea nitrogen 55.1  DRAIN/DEVICES: PIV R arm saline locked.  TELEMETRY RHYTHM: NA  SKIN: Blanchable redness to R upper back and L lateral foot. Scattered scabs/moles. Old fistula site to L forearm  TESTS/PROCEDURES: EGD/ colonoscopy done 7/16   D/C DAY/GOALS/PLACE: Discharge pending  OTHER IMPORTANT INFO: Enteric precautions maintained, Elastase fecal sample processing, Chronic cough, productive at times, LS clear.

## 2025-07-17 NOTE — CONSULTS
Care Management Initial Consult    General Information  Assessment completed with: Mathew Rosales  Type of CM/SW Visit: Initial Assessment    Primary Care Provider verified and updated as needed: Yes   Readmission within the last 30 days: no previous admission in last 30 days      Reason for Consult: discharge planning  Advance Care Planning: Advance Care Planning Reviewed: no concerns identified          Communication Assessment  Patient's communication style: spoken language (English or Bilingual)    Hearing Difficulty or Deaf: no   Wear Glasses or Blind: yes    Cognitive  Cognitive/Neuro/Behavioral: WDL                      Living Environment:   People in home: spouse, grandchild(rosita)  marielle  Current living Arrangements: house      Able to return to prior arrangements: yes       Family/Social Support:  Care provided by: self  Provides care for: no one  Marital Status:   Support system: Wife  Marielle       Description of Support System: Supportive, Involved    Support Assessment: Adequate family and caregiver support    Current Resources:   Patient receiving home care services: No        Community Resources: None  Equipment currently used at home: none  Supplies currently used at home: Diabetic Supplies    Employment/Financial:  Employment Status: retired, employed part-time, self-employed        Financial Concerns: none   Referral to Financial Worker: No       Does the patient's insurance plan have a 3 day qualifying hospital stay waiver?  No    Lifestyle & Psychosocial Needs:  Social Drivers of Health     Food Insecurity: Low Risk  (7/14/2025)    Food Insecurity     Within the past 12 months, did you worry that your food would run out before you got money to buy more?: No     Within the past 12 months, did the food you bought just not last and you didn t have money to get more?: No   Depression: Not at risk (2/14/2025)    [facility]    PHQ-2     PHQ-2 TOTAL  SCORE: 1   Housing Stability: Low Risk  (7/14/2025)    Housing Stability     Do you have housing? : Yes     Are you worried about losing your housing?: No   Tobacco Use: High Risk (7/16/2025)    Patient History     Smoking Tobacco Use: Every Day     Smokeless Tobacco Use: Never     Passive Exposure: Not on file   Financial Resource Strain: Low Risk  (7/14/2025)    Financial Resource Strain     Within the past 12 months, have you or your family members you live with been unable to get utilities (heat, electricity) when it was really needed?: No   Alcohol Use: Not on file   Transportation Needs: Low Risk  (7/14/2025)    Transportation Needs     Within the past 12 months, has lack of transportation kept you from medical appointments, getting your medicines, non-medical meetings or appointments, work, or from getting things that you need?: No   Physical Activity: Not on file   Interpersonal Safety: Low Risk  (7/16/2025)    Interpersonal Safety     Do you feel physically and emotionally safe where you currently live?: Yes     Within the past 12 months, have you been hit, slapped, kicked or otherwise physically hurt by someone?: No     Within the past 12 months, have you been humiliated or emotionally abused in other ways by your partner or ex-partner?: No   Stress: Not on file   Social Connections: Socially Integrated (9/9/2024)    [facility]    Social Connections     Do you often feel lonely or isolated from those around you?: 0   Health Literacy: Not on file       Functional Status:  Prior to admission patient needed assistance:   Dependent ADLs:: Independent  Dependent IADLs:: Independent  Assesssment of Functional Status: At functional baseline    Mental Health Status:          Chemical Dependency Status:                Values/Beliefs:  Spiritual, Cultural Beliefs, Sabianist Practices, Values that affect care: no               Discussed  Partnership in Safe Discharge  Planning  document with patient/family: No    Additional information:      Consulted for discharge planning, writer met with Pt at bedside and introduced self and role in discharge planning. Pt lives in a house with his Wife, Emelia and grandson (25).  Per Pt prior to admit, Pt was independent with all ADL's and was still driving. Pt denies any financial concerns at this time. Pt wasn't receiving any [facility] services. Discussed that Pt would be able to return home with no needs.  Emelia will transport Pt at discharge.            Inpatient Care Coordinator will continue to follow for discharge needs.      Next Steps: None        Eliana Vnag, RN, BSN  RN Care Coordinator  [facility]   Contact via [employer]

## 2025-07-17 NOTE — PROGRESS NOTES
Care Management Discharge Note    Discharge Date: 07/17/2025       Discharge Disposition: Home    Discharge Services: None    Discharge DME: None    Discharge Transportation: car, drives self, family or friend will provide    Private pay costs discussed: Not applicable    Does the patient's insurance plan have a 3 day qualifying hospital stay waiver?  No    PAS Confirmation Code:    Patient/family educated on Medicare website which has current facility and service quality ratings: no    Education Provided on the Discharge Plan:    Persons Notified of Discharge Plans: Bedside RN  Patient/Family in Agreement with the Plan: yes    Handoff Referral Completed: No, handoff not indicated or clinically appropriate    Additional Information:  Pt will return home with no needs. Pt's Wife will  Pt at discharge.         Eliana Vang, RN, BSN  RN Care Coordinator  [facility]   Contact via [employer]

## 2025-07-17 NOTE — DISCHARGE SUMMARY
"[facility]  Hospitalist Discharge Summary      Date of Admission:  7/14/2025  Date of Discharge:  7/17/2025  Discharging Provider: Alessio Johnson MD  Discharge Service: Hospitalist Service    Discharge Diagnoses   RADHA on CKD4, likely pre-renal, resolved  ESRD s/p DDKT (1989)  NAGMA, resolved  Anemia of CKD  BMD of CKD  Acute on chronic non-bloody diarrhea, improved  Colonic diverticulosis  T1DM  Hx of pancreatic transplant w/ transplant failure (1989)  Gastritis, duodenitis   HTN   DANIEL   MGUS    Clinically Significant Risk Factors     # DMII: A1C = 7.2 % (Ref range: <5.7 %) within past 6 months  # Overweight: Estimated body mass index is 28.62 kg/m  as calculated from the following:    Height as of this encounter: 1.651 m (5' 5\").    Weight as of this encounter: 78 kg (172 lb).       Follow-ups Needed After Discharge   Follow-up Appointments       Hospital Follow-up with Existing Primary Care Provider (PCP)          Schedule Primary Care visit within: 30 Days             Unresulted Labs Ordered in the Past 30 Days of this Admission       Date and Time Order Name Status Description    7/16/2025  2:02 PM Viral Culture Non-respiratory In process     7/16/2025  9:01 AM Surgical Pathology Exam In process     7/15/2025 11:52 AM Elastase Fecal In process         These results will be followed up by GI/PCP    Discharge Disposition   Discharged to home  Condition at discharge: Stable    Hospital Course   Mathew Vaughan is a 70 year old male with history of ESRD s/p DDKT, T1DM s/p pancreatic transplant, chronic diarrhea who was admitted on 7/14/2025 with RADHA and diarrhea.     RADHA on CKD4, likely pre-renal, resolved  ESRD s/p DDKT (1989)  NAGMA, resolved  Anemia of CKD  BMD of CKD  ESRD 2/2 diabetic nephropathy and s/p DDKT in 1989; remains on immunosuppression. Presented with acute decline in renal function, NAGMA in setting of persistent diarrhea. Levels at goal. Continues to make urine, remains " HDS. Renal function with improvement after IVF resuscitation.   -Continue MMF, cyclosporine  -Resume PTA baking soda on discharge    Acute on chronic non-bloody diarrhea, improved  Colonic diverticulosis  Patient presenting with non-bloody diarrhea without fever. Per review, non-bloody diarrhea does appear to be chronic and intermittent. EBV negative previously. CMV negative, cyclosporine level normal on admission. Stool panel w/ Cdiff negative, parasite negative. Upper/lower endoscopy performed 7/16 with evidence of gastritis, duodenitis, and diverticulosis without clear cause of diarrhea. Unclear etiology with quite broad differential; notable concern for pancreatic insufficiency given chronicity in setting of failed transplant.  -Encourage PO intake  -Start creon TID with meals  -Follow-up biopsies, fecal elastase  -[facility] will arrange close outpatient follow-up    T1DM  Hx of pancreatic transplant w/ transplant failure (1989)  Patient w/ type 1 DM (diagnosed at age 7, off insulin after transplant and resumed upon transplant failure). A1c 6.9 in 2/2025. PTA regimen includes lantus 15u + aspart w/ meals.  -Resume PTA insulin regimen    Gastritis, duodenitis - Continue PPI  HTN - Continue metoprolol and amlodipine, stop lisinopril  DANIEL - Continue escitalopram  MGUS - Stable, outpatient follow-up    Consultations This Hospital Stay   NEPHROLOGY IP CONSULT  NEPHROLOGY IP CONSULT  GI LUMINAL ADULT IP CONSULT  GASTROENTEROLOGY IP CONSULT  GASTROENTEROLOGY IP CONSULT  CARE MANAGEMENT / SOCIAL WORK IP CONSULT    Code Status   Full Code    Time Spent on this Encounter   I, Alessio Johnson MD, personally saw the patient today and spent greater than 30 minutes discharging this patient.     Alessio Johnson MD  [place] [address] [facility]  [facility] [place] [address]  Phone: [phone]  ______________________________________________________________________    Physical Exam   Vital  Signs: Temp: 99  F (37.2  C) Temp src: Oral BP: (!) 142/67 Pulse: 69   Resp: 16 SpO2: 98 % O2 Device: None (Room air)    Weight: 172 lbs 0 oz    General: Awake, alert, NAD, appropriate interaction, sitting upright in bed  HEENT: Atraumatic, normocephalic, EOMI, no scleral icterus  CV: RRR, no murmurs, no AILYN, distal pulses intact  Pulm: CTAB, breathing comfortably on RA, no wheezes, no crackles  Abd: Soft, non-tender, non-distended  Skin: No rashes, lesions, or wounds visualized  Neuro: AOx4, CN II-XII grossly intact, no focal deficits, moving all extremities spontaneously, speech normal       Primary Care Physician   [facility]    Discharge Orders      Reason for your hospital stay    You were at the hospital due to a kidney injury and diarrhea.     Activity    Your activity upon discharge: activity as tolerated     Diet    Follow this diet upon discharge: Current Diet:Orders Placed This Encounter      Moderate Consistent Carb (60 g CHO per Meal) Diet     Hospital Follow-up with Existing Primary Care Provider (PCP)          Significant Results and Procedures   Most Recent 3 CBC's:  Recent Labs   Lab Test 07/16/25  0656 07/15/25  0640 07/14/25  1200   WBC 7.3 6.5 6.6   HGB 11.0* 10.2* 11.5*   * 98 104*    207 222   Most Recent 3 BMP's:  Recent Labs   Lab Test 07/17/25  0836 07/17/25  0814 07/17/25  0723 07/16/25  1144 07/16/25  0656 07/15/25  1258 07/15/25  1105   NA  --   --  139  --  140  --  134*   POTASSIUM  --   --  3.9  --  3.9  --  3.7   CHLORIDE  --   --  101  --  104  --  98   CO2  --   --  22  --  23  --  21*   BUN  --   --  38.6*  --  55.1*  --  82.0*   CR  --   --  1.76*  --  1.72*  --  2.05*   ANIONGAP  --   --  16*  --  13  --  15   TRICIA  --   --  9.4  --  9.6  --  9.0   * 77 72   < > 146*   < > 182*    < > = values in this interval not displayed.     Discharge Medications      Review of your medicines        START taking        Dose / Directions    lipase-protease-amylase [id number] units Cpep  Commonly known as: CREON 36      Dose: 2 capsule  Take 2 capsules by mouth 3 times daily (with meals).  Quantity: 180 capsule  Refills: 1     pantoprazole 40 MG EC tablet  Commonly known as: PROTONIX      Dose: 40 mg  Start taking on: July 18, 2025  Take 1 tablet (40 mg) by mouth every morning (before breakfast).  Quantity: 60 tablet  Refills: 1            CONTINUE these medicines which have NOT CHANGED        Dose / Directions   amLODIPine 10 MG tablet  Commonly known as: NORVASC      Dose: 10 mg  Take 10 mg by mouth daily.  Refills: 0     busPIRone 10 MG tablet  Commonly known as: BUSPAR      Dose: 10 mg  Take 10 mg by mouth 2 times daily.  Refills: 0     cycloSPORINE modified 25 MG capsule  Commonly known as: GENERIC EQUIVALENT  Used for: Kidney replaced by transplant, Pancreas replaced by transplant (H)      Take 4 capsules (100 mg) by mouth every morning AND 3 capsules (75 mg) every evening.  Quantity: 210 capsule  Refills: 11     escitalopram 10 MG tablet  Commonly known as: LEXAPRO      Dose: 10 mg  Take 10 mg by mouth daily.  Refills: 0     fluticasone 50 MCG/ACT nasal spray  Commonly known as: FLONASE      Dose: 1 spray  Spray 1 spray into both nostrils daily as needed for allergies or rhinitis.  Refills: 0     insulin glargine 100 UNIT/ML pen  Commonly known as: LANTUS PEN      Dose: 15 Units  Inject 15 Units subcutaneously at bedtime.  Refills: 0     metoprolol succinate  MG 24 hr tablet  Commonly known as: TOPROL XL      Dose: 100 mg  Take 100 mg by mouth every evening.  Refills: 0     mycophenolic acid 360 MG EC tablet  Commonly known as: GENERIC EQUIVALENT  Used for: Kidney replaced by transplant      Dose: 360 mg  Take 1 tablet (360 mg) by mouth 2 times daily.  Quantity: 180 tablet  Refills: 3     niacinamide 500 MG tablet      Dose: 500 mg  Take 500 mg by mouth 2 times daily (with meals).  Refills: 0     * NovoLOG FLEXPEN 100 UNIT/ML  soln  Generic drug: insulin aspart      Dose: 5-7 Units  Inject 5-7 Units subcutaneously daily (with breakfast).  Refills: 0     * NovoLOG FLEXPEN 100 UNIT/ML soln  Generic drug: insulin aspart      Dose: 5 Units  Inject 5 Units subcutaneously daily (with lunch).  Refills: 0     * NovoLOG FLEXPEN 100 UNIT/ML soln  Generic drug: insulin aspart      Dose: 9 Units  Inject 9 Units subcutaneously daily (with dinner).  Refills: 0     sodium bicarbonate (antacid) Powd powder      Dose: 3 g  Take 3 g by mouth daily. 0.5 teaspoon of baking soda mixed in liquid once daily  Refills: 0     vitamin D3 50 mcg (2000 units) tablet  Commonly known as: CHOLECALCIFEROL      Dose: 1 tablet  Take 1 tablet by mouth daily  Refills: 0           * This list has 3 medication(s) that are the same as other medications prescribed for you. Read the directions carefully, and ask your doctor or other care provider to review them with you.                STOP taking      lisinopril 40 MG tablet  Commonly known as: ZESTRIL                  Where to get your medicines        These medications were sent to [place] [address] [place] [facility] [address] [place] [address]  [address] [facility] [address]      Phone: [phone]   pantoprazole 40 MG EC tablet       Information about where to get these medications is not yet available    Ask your nurse or doctor about these medications  lipase-protease-amylase [id number] units Cpep       Allergies   No Known Allergies

## 2025-07-17 NOTE — PLAN OF CARE
Goal Outcome Evaluation:      Plan of Care Reviewed With: patient          Outcome Evaluation: Home      Eliana Vang, RN, BSN  RN Care Coordinator  [facility]   Contact via [facility]

## 2025-07-17 NOTE — PROGRESS NOTES
"[facility] GASTROENTEROLOGY PROGRESS NOTE    SUBJECTIVE:  feeling a little better today    OBJECTIVE:    BP (!) 142/67 (BP Location: Right arm)   Pulse 69   Temp 99  F (37.2  C) (Oral)   Resp 16   Ht 1.651 m (5' 5\")   Wt 78 kg (172 lb)   SpO2 98%   BMI 28.62 kg/m    Temp (24hrs), Av.8  F (37.1  C), Min:98.5  F (36.9  C), Max:99  F (37.2  C)    Patient Vitals for the past 72 hrs:   Weight   25 0835 78 kg (172 lb)   25 0618 78.2 kg (172 lb 4.8 oz)   07/15/25 06 79.1 kg (174 lb 6.1 oz)   25 78.8 kg (173 lb 12.8 oz)       Intake/Output Summary (Last 24 hours) at 2025 1032  Last data filed at 2025 0919  Gross per 24 hour   Intake 660 ml   Output --   Net 660 ml         PHYSICAL EXAM    Constitutional: NAD, comfortable  Neuro: grossly normal        Additional Comments:  ROS, FH, SH: See initial GI consult for details.    I have reviewed the patient's new clinical lab results:    Recent Labs   Lab Test 25  0656 07/15/25  0640 25  1200   WBC 7.3 6.5 6.6   HGB 11.0* 10.2* 11.5*   * 98 104*    207 222     Recent Labs   Lab Test 25  0723 25  0656 07/15/25  1105    140 134*   POTASSIUM 3.9 3.9 3.7   CHLORIDE 101 104 98   CO2 22 23 21*   BUN 38.6* 55.1* 82.0*   CR 1.76* 1.72* 2.05*   ANIONGAP 16* 13 15   TRICIA 9.4 9.6 9.0     Recent Labs   Lab Test 25  0656 07/15/25  1105 07/15/25  0207 25  1200   ALBUMIN 3.8 3.6  --   --    PROTEIN  --   --  Negative  --    LIPASE  --   --   --  88*         Principal Problem:  CONSULTATION ASSESSMENT AND PLAN:    Principal Problem:  Diarrhea  Nausea  Assessment:  69 yo man s/p kidney/pancreas transplant in . Pancreas failed and he is insulin dependent.   Diarrhea for over a year, worse in past few weeks.  So far CDiff and enteric panel negative, CMV negative.    He is on cyclosporine.   States he thinks he felt better when he was on Azathioprine in the past.        EGD/Colon 25 remarkable only " for gastritis. No evidence of inflammatory bowel disease or CMV colitis.     This could be pancreatic insufficiency, pancreatic elastase still pending. Microscopic colitis, dumping syndrome, diabetic gastroparesis could all be contributing.     Plan:  - trial of creon  - continue PPI given gastritis  - await results of pathology from endoscopy studies and fecal pancreatic elastase  - results all still pending, discharge planned for today, will arrange outpatient GI clinic visit, [facility] will call patient to arrange         Марина Ledesma  [facility]  Office:  [phone]  35 minutes of total time was spent providing patient care, including patient evaluation, reviewing documentation/test results, and .

## 2025-07-17 NOTE — DISCHARGE SUMMARY
Discharge    Patient discharged to home via ride with daughter.  Care plan note: Alert and oriented x4. VSS on RA. Denies pain. Ambulating independently in halls. Showered this AM. Reports one soft stool this AM. Stool sample pending but per MD, GI will contact patient with stool sample results. PIV removed. Discharge medications and paperwork gone over with patient and able to read back with clear understanding.    Listed belongings gathered and given to patient (including from security/pharmacy). Yes  Care Plan and Patient education resolved: Yes  Prescriptions if needed, hard copies sent with patient  Yes  Medication Bin checked and emptied on discharge Yes  SW/care coordinator/charge RN aware of discharge: Yes     Summary:  Presented with diarrhea, poor appetite and acute renal failure superimposed on stage 4 CKD. Hx kidney/pancreas transplant.    DATE & TIME: 25 [id number]  Cognitive Concerns/ Orientation: A&O x4   BEHAVIOR & AGGRESSION TOOL COLOR: Green   ABNL VS/O2: VSS on RA  MOBILITY: Independent  PAIN MANAGMENT: Denies  DIET: Mod carb  BOWEL/BLADDER: Cont. B&B. Ambulating to bathroom. BM x1 this shift.  ABNL LAB/BG: B (Eating with recheck 118)  DRAIN/DEVICES: R PIV SL  TELEMETRY RHYTHM: NA  SKIN: Blanchable redness to R upper back and L lateral foot. Scattered scabs/moles. Old fistula site to L forearm  TESTS/PROCEDURES: none new  D/C DAY/GOALS/PLACE: pending  OTHER IMPORTANT INFO: Elastase fecal sample processing. Chronic cough, productive at times, LS clear.

## 2025-07-18 LAB — ELASTASE PANC STL-MCNT: 251 UG/G

## 2025-07-22 ENCOUNTER — MYC MEDICAL ADVICE (OUTPATIENT)
Dept: TRANSPLANT | Facility: CLINIC | Age: 70
End: 2025-07-22
Payer: COMMERCIAL

## 2025-07-22 DIAGNOSIS — I15.1 HTN, KIDNEY TRANSPLANT RELATED: ICD-10-CM

## 2025-07-22 DIAGNOSIS — Z94.0 KIDNEY REPLACED BY TRANSPLANT: Primary | ICD-10-CM

## 2025-07-22 DIAGNOSIS — Z94.0 HTN, KIDNEY TRANSPLANT RELATED: ICD-10-CM
